# Patient Record
Sex: MALE | Race: BLACK OR AFRICAN AMERICAN | NOT HISPANIC OR LATINO | Employment: OTHER | URBAN - METROPOLITAN AREA
[De-identification: names, ages, dates, MRNs, and addresses within clinical notes are randomized per-mention and may not be internally consistent; named-entity substitution may affect disease eponyms.]

---

## 2018-03-26 ENCOUNTER — CLINICAL SUPPORT (OUTPATIENT)
Dept: FAMILY MEDICINE | Facility: CLINIC | Age: 53
End: 2018-03-26
Payer: MEDICARE

## 2018-03-26 DIAGNOSIS — Z00.00 ROUTINE GENERAL MEDICAL EXAMINATION AT A HEALTH CARE FACILITY: Primary | ICD-10-CM

## 2018-03-26 LAB
BILIRUB SERPL-MCNC: NORMAL MG/DL
BLOOD URINE, POC: NORMAL
COLOR, POC UA: NORMAL
GLUCOSE UR QL STRIP: NORMAL
KETONES UR QL STRIP: NORMAL
LEUKOCYTE ESTERASE URINE, POC: NORMAL
NITRITE, POC UA: NORMAL
PH, POC UA: NORMAL
PROTEIN, POC: NORMAL
SPECIFIC GRAVITY, POC UA: NORMAL
UROBILINOGEN, POC UA: NORMAL

## 2018-03-26 PROCEDURE — 99201 PR OFFICE/OUTPT VISIT,NEW,LEVL I: CPT | Mod: S$GLB,,, | Performed by: FAMILY MEDICINE

## 2018-03-26 PROCEDURE — 81002 URINALYSIS NONAUTO W/O SCOPE: CPT | Mod: S$GLB,,, | Performed by: FAMILY MEDICINE

## 2018-05-07 ENCOUNTER — HOSPITAL ENCOUNTER (INPATIENT)
Facility: HOSPITAL | Age: 53
LOS: 3 days | Discharge: HOME | DRG: 189 | End: 2018-05-11
Attending: EMERGENCY MEDICINE | Admitting: INTERNAL MEDICINE
Payer: MEDICARE

## 2018-05-07 DIAGNOSIS — E11.65 TYPE 2 DIABETES MELLITUS WITH HYPERGLYCEMIA, WITH LONG-TERM CURRENT USE OF INSULIN: ICD-10-CM

## 2018-05-07 DIAGNOSIS — R73.9 HYPERGLYCEMIA: ICD-10-CM

## 2018-05-07 DIAGNOSIS — J44.1 COPD WITH ACUTE EXACERBATION: ICD-10-CM

## 2018-05-07 DIAGNOSIS — E78.5 HYPERLIPIDEMIA, UNSPECIFIED HYPERLIPIDEMIA TYPE: ICD-10-CM

## 2018-05-07 DIAGNOSIS — J96.01 ACUTE HYPOXEMIC RESPIRATORY FAILURE: ICD-10-CM

## 2018-05-07 DIAGNOSIS — N17.9 AKI (ACUTE KIDNEY INJURY): ICD-10-CM

## 2018-05-07 DIAGNOSIS — R05.9 COUGH: ICD-10-CM

## 2018-05-07 DIAGNOSIS — R06.00 DYSPNEA, UNSPECIFIED TYPE: Primary | ICD-10-CM

## 2018-05-07 DIAGNOSIS — J44.1 COPD EXACERBATION: ICD-10-CM

## 2018-05-07 DIAGNOSIS — R06.02 SHORTNESS OF BREATH: ICD-10-CM

## 2018-05-07 DIAGNOSIS — I10 ESSENTIAL HYPERTENSION: ICD-10-CM

## 2018-05-07 DIAGNOSIS — Z79.4 TYPE 2 DIABETES MELLITUS WITH HYPERGLYCEMIA, WITH LONG-TERM CURRENT USE OF INSULIN: ICD-10-CM

## 2018-05-07 LAB
ALBUMIN SERPL BCP-MCNC: 3.2 G/DL
ALLENS TEST: ABNORMAL
ALP SERPL-CCNC: 154 U/L
ALT SERPL W/O P-5'-P-CCNC: 55 U/L
ANION GAP SERPL CALC-SCNC: 14 MMOL/L
AST SERPL-CCNC: 36 U/L
B-OH-BUTYR BLD STRIP-SCNC: 0.1 MMOL/L
BASOPHILS # BLD AUTO: 0.01 K/UL
BASOPHILS NFR BLD: 0.1 %
BILIRUB SERPL-MCNC: 0.5 MG/DL
BNP SERPL-MCNC: 39 PG/ML
BUN SERPL-MCNC: 19 MG/DL
CALCIUM SERPL-MCNC: 8.9 MG/DL
CHLORIDE SERPL-SCNC: 98 MMOL/L
CO2 SERPL-SCNC: 21 MMOL/L
CREAT SERPL-MCNC: 1.5 MG/DL
DELSYS: ABNORMAL
DIFFERENTIAL METHOD: ABNORMAL
EOSINOPHIL # BLD AUTO: 0.2 K/UL
EOSINOPHIL NFR BLD: 2 %
ERYTHROCYTE [DISTWIDTH] IN BLOOD BY AUTOMATED COUNT: 14.9 %
EST. GFR  (AFRICAN AMERICAN): >60 ML/MIN/1.73 M^2
EST. GFR  (NON AFRICAN AMERICAN): 54 ML/MIN/1.73 M^2
GLUCOSE SERPL-MCNC: 456 MG/DL (ref 70–110)
GLUCOSE SERPL-MCNC: 456 MG/DL (ref 70–110)
GLUCOSE SERPL-MCNC: 494 MG/DL
HCO3 UR-SCNC: 24.5 MMOL/L (ref 24–28)
HCT VFR BLD AUTO: 34.2 %
HGB BLD-MCNC: 11.4 G/DL
LACTATE SERPL-SCNC: 2.1 MMOL/L
LYMPHOCYTES # BLD AUTO: 1.9 K/UL
LYMPHOCYTES NFR BLD: 16.1 %
MCH RBC QN AUTO: 26.8 PG
MCHC RBC AUTO-ENTMCNC: 33.3 G/DL
MCV RBC AUTO: 80 FL
MONOCYTES # BLD AUTO: 0.9 K/UL
MONOCYTES NFR BLD: 7.8 %
NEUTROPHILS # BLD AUTO: 8.5 K/UL
NEUTROPHILS NFR BLD: 74 %
PCO2 BLDA: 40.6 MMHG (ref 35–45)
PH SMN: 7.39 [PH] (ref 7.35–7.45)
PLATELET # BLD AUTO: 267 K/UL
PMV BLD AUTO: 10.7 FL
PO2 BLDA: 56 MMHG (ref 40–60)
POC BE: 0 MMOL/L
POC SATURATED O2: 88 % (ref 95–100)
POC TCO2: 26 MMOL/L (ref 24–29)
POTASSIUM SERPL-SCNC: 4 MMOL/L
PROT SERPL-MCNC: 6.7 G/DL
RBC # BLD AUTO: 4.26 M/UL
SAMPLE: ABNORMAL
SODIUM SERPL-SCNC: 133 MMOL/L
TROPONIN I SERPL DL<=0.01 NG/ML-MCNC: 0.01 NG/ML
WBC # BLD AUTO: 11.51 K/UL

## 2018-05-07 PROCEDURE — 80053 COMPREHEN METABOLIC PANEL: CPT

## 2018-05-07 PROCEDURE — 87040 BLOOD CULTURE FOR BACTERIA: CPT

## 2018-05-07 PROCEDURE — 82962 GLUCOSE BLOOD TEST: CPT

## 2018-05-07 PROCEDURE — 82803 BLOOD GASES ANY COMBINATION: CPT

## 2018-05-07 PROCEDURE — 83880 ASSAY OF NATRIURETIC PEPTIDE: CPT

## 2018-05-07 PROCEDURE — 96374 THER/PROPH/DIAG INJ IV PUSH: CPT

## 2018-05-07 PROCEDURE — 83605 ASSAY OF LACTIC ACID: CPT

## 2018-05-07 PROCEDURE — 93010 ELECTROCARDIOGRAM REPORT: CPT | Mod: ,,, | Performed by: INTERNAL MEDICINE

## 2018-05-07 PROCEDURE — 93005 ELECTROCARDIOGRAM TRACING: CPT

## 2018-05-07 PROCEDURE — 63600175 PHARM REV CODE 636 W HCPCS: Performed by: EMERGENCY MEDICINE

## 2018-05-07 PROCEDURE — 99900035 HC TECH TIME PER 15 MIN (STAT)

## 2018-05-07 PROCEDURE — 96375 TX/PRO/DX INJ NEW DRUG ADDON: CPT

## 2018-05-07 PROCEDURE — 94644 CONT INHLJ TX 1ST HOUR: CPT

## 2018-05-07 PROCEDURE — 96372 THER/PROPH/DIAG INJ SC/IM: CPT

## 2018-05-07 PROCEDURE — 82010 KETONE BODYS QUAN: CPT

## 2018-05-07 PROCEDURE — 81000 URINALYSIS NONAUTO W/SCOPE: CPT

## 2018-05-07 PROCEDURE — 84484 ASSAY OF TROPONIN QUANT: CPT

## 2018-05-07 PROCEDURE — 25000242 PHARM REV CODE 250 ALT 637 W/ HCPCS: Performed by: EMERGENCY MEDICINE

## 2018-05-07 PROCEDURE — 85025 COMPLETE CBC W/AUTO DIFF WBC: CPT

## 2018-05-07 PROCEDURE — 99285 EMERGENCY DEPT VISIT HI MDM: CPT | Mod: 25

## 2018-05-07 RX ORDER — INSULIN ASPART 100 [IU]/ML
1-10 INJECTION, SOLUTION INTRAVENOUS; SUBCUTANEOUS
Status: DISCONTINUED | OUTPATIENT
Start: 2018-05-07 | End: 2018-05-08

## 2018-05-07 RX ORDER — IPRATROPIUM BROMIDE 0.5 MG/2.5ML
1 SOLUTION RESPIRATORY (INHALATION)
Status: COMPLETED | OUTPATIENT
Start: 2018-05-07 | End: 2018-05-07

## 2018-05-07 RX ORDER — ALBUTEROL SULFATE 0.83 MG/ML
15 SOLUTION RESPIRATORY (INHALATION)
Status: COMPLETED | OUTPATIENT
Start: 2018-05-07 | End: 2018-05-07

## 2018-05-07 RX ORDER — IBUPROFEN 200 MG
16 TABLET ORAL
Status: DISCONTINUED | OUTPATIENT
Start: 2018-05-07 | End: 2018-05-11 | Stop reason: HOSPADM

## 2018-05-07 RX ORDER — IBUPROFEN 200 MG
24 TABLET ORAL
Status: DISCONTINUED | OUTPATIENT
Start: 2018-05-07 | End: 2018-05-11 | Stop reason: HOSPADM

## 2018-05-07 RX ORDER — GLUCAGON 1 MG
1 KIT INJECTION
Status: DISCONTINUED | OUTPATIENT
Start: 2018-05-07 | End: 2018-05-11 | Stop reason: HOSPADM

## 2018-05-07 RX ORDER — METHYLPREDNISOLONE SOD SUCC 125 MG
125 VIAL (EA) INJECTION
Status: COMPLETED | OUTPATIENT
Start: 2018-05-07 | End: 2018-05-07

## 2018-05-07 RX ADMIN — IPRATROPIUM BROMIDE 1 MG: 0.5 SOLUTION RESPIRATORY (INHALATION) at 09:05

## 2018-05-07 RX ADMIN — ALBUTEROL SULFATE 15 MG: 2.5 SOLUTION RESPIRATORY (INHALATION) at 09:05

## 2018-05-07 RX ADMIN — METHYLPREDNISOLONE SODIUM SUCCINATE 125 MG: 125 INJECTION, POWDER, FOR SOLUTION INTRAMUSCULAR; INTRAVENOUS at 10:05

## 2018-05-07 RX ADMIN — INSULIN HUMAN 10 UNITS: 100 INJECTION, SOLUTION PARENTERAL at 10:05

## 2018-05-07 NOTE — LETTER
May 8, 2018         [address]  [place] [address]  Phone: [phone]  Fax: [phone]       To Whom It May Concern:    Nyla Riley has been at [facility] visiting her  in the critical care department. 05/08/2018. Please excuse her from work today as her  has been ill and under out care.  If you have any questions or concerns, or if I can be of further assistance, please do not hesitate to contact me.    Sincerely,        Vern Francisco, NOAHN, RN, CCRN  [facility]

## 2018-05-08 PROBLEM — I10 ESSENTIAL HYPERTENSION: Status: ACTIVE | Noted: 2018-05-08

## 2018-05-08 PROBLEM — N17.9 AKI (ACUTE KIDNEY INJURY): Status: ACTIVE | Noted: 2018-05-08

## 2018-05-08 PROBLEM — D50.9 MICROCYTIC ANEMIA: Status: ACTIVE | Noted: 2018-05-08

## 2018-05-08 PROBLEM — E78.5 HYPERLIPIDEMIA: Status: ACTIVE | Noted: 2018-05-08

## 2018-05-08 PROBLEM — E11.9 TYPE 2 DIABETES MELLITUS, WITH LONG-TERM CURRENT USE OF INSULIN: Status: ACTIVE | Noted: 2018-05-08

## 2018-05-08 PROBLEM — R05.9 COUGH: Status: ACTIVE | Noted: 2018-05-08

## 2018-05-08 PROBLEM — Z79.4 TYPE 2 DIABETES MELLITUS, WITH LONG-TERM CURRENT USE OF INSULIN: Status: ACTIVE | Noted: 2018-05-08

## 2018-05-08 PROBLEM — J44.1 COPD WITH ACUTE EXACERBATION: Status: ACTIVE | Noted: 2018-05-08

## 2018-05-08 PROBLEM — J96.01 ACUTE HYPOXEMIC RESPIRATORY FAILURE: Status: ACTIVE | Noted: 2018-05-08

## 2018-05-08 LAB
A1AT SERPL-MCNC: 210 MG/DL
ALBUMIN SERPL BCP-MCNC: 3.2 G/DL
ALP SERPL-CCNC: 163 U/L
ALT SERPL W/O P-5'-P-CCNC: 55 U/L
ANION GAP SERPL CALC-SCNC: 13 MMOL/L
AST SERPL-CCNC: 26 U/L
BACTERIA #/AREA URNS HPF: NORMAL /HPF
BASOPHILS # BLD AUTO: 0.01 K/UL
BASOPHILS NFR BLD: 0.1 %
BILIRUB SERPL-MCNC: 0.4 MG/DL
BILIRUB UR QL STRIP: NEGATIVE
BUN SERPL-MCNC: 21 MG/DL
CALCIUM SERPL-MCNC: 9.6 MG/DL
CHLORIDE SERPL-SCNC: 97 MMOL/L
CLARITY UR: CLEAR
CO2 SERPL-SCNC: 24 MMOL/L
COLOR UR: YELLOW
CREAT SERPL-MCNC: 1.5 MG/DL
CREAT UR-MCNC: 70.3 MG/DL
DIFFERENTIAL METHOD: ABNORMAL
EOSINOPHIL # BLD AUTO: 0 K/UL
EOSINOPHIL NFR BLD: 0.2 %
ERYTHROCYTE [DISTWIDTH] IN BLOOD BY AUTOMATED COUNT: 14.4 %
EST. GFR  (AFRICAN AMERICAN): >60 ML/MIN/1.73 M^2
EST. GFR  (NON AFRICAN AMERICAN): 54 ML/MIN/1.73 M^2
ESTIMATED AVG GLUCOSE: 223 MG/DL
FERRITIN SERPL-MCNC: 1380 NG/ML
FOLATE SERPL-MCNC: 9.3 NG/ML
GLUCOSE SERPL-MCNC: 361 MG/DL (ref 70–110)
GLUCOSE SERPL-MCNC: 516 MG/DL
GLUCOSE UR QL STRIP: ABNORMAL
HBA1C MFR BLD HPLC: 9.4 %
HCT VFR BLD AUTO: 34.9 %
HGB BLD-MCNC: 12 G/DL
HGB UR QL STRIP: NEGATIVE
IRON SERPL-MCNC: 16 UG/DL
KETONES UR QL STRIP: NEGATIVE
LEUKOCYTE ESTERASE UR QL STRIP: NEGATIVE
LYMPHOCYTES # BLD AUTO: 0.8 K/UL
LYMPHOCYTES NFR BLD: 6.5 %
MCH RBC QN AUTO: 27.3 PG
MCHC RBC AUTO-ENTMCNC: 34.4 G/DL
MCV RBC AUTO: 79 FL
MICROSCOPIC COMMENT: NORMAL
MONOCYTES # BLD AUTO: 0.2 K/UL
MONOCYTES NFR BLD: 1.3 %
NEUTROPHILS # BLD AUTO: 11.6 K/UL
NEUTROPHILS NFR BLD: 91.3 %
NITRITE UR QL STRIP: NEGATIVE
PH UR STRIP: 5 [PH] (ref 5–8)
PLATELET # BLD AUTO: 275 K/UL
PMV BLD AUTO: 10.1 FL
POCT GLUCOSE: 361 MG/DL (ref 70–110)
POCT GLUCOSE: 397 MG/DL (ref 70–110)
POCT GLUCOSE: 405 MG/DL (ref 70–110)
POCT GLUCOSE: 418 MG/DL (ref 70–110)
POCT GLUCOSE: 427 MG/DL (ref 70–110)
POCT GLUCOSE: 456 MG/DL (ref 70–110)
POCT GLUCOSE: 468 MG/DL (ref 70–110)
POTASSIUM SERPL-SCNC: 4.9 MMOL/L
PROT SERPL-MCNC: 7.8 G/DL
PROT UR QL STRIP: NEGATIVE
RBC # BLD AUTO: 4.4 M/UL
RBC #/AREA URNS HPF: 2 /HPF (ref 0–4)
RETICS/RBC NFR AUTO: 1 %
SATURATED IRON: 6 %
SODIUM SERPL-SCNC: 134 MMOL/L
SODIUM UR-SCNC: 31 MMOL/L
SP GR UR STRIP: 1.01 (ref 1–1.03)
SQUAMOUS #/AREA URNS HPF: 2 /HPF
TOTAL IRON BINDING CAPACITY: 260 UG/DL
TRANSFERRIN SERPL-MCNC: 176 MG/DL
TSH SERPL DL<=0.005 MIU/L-ACNC: 0.56 UIU/ML
URN SPEC COLLECT METH UR: ABNORMAL
UROBILINOGEN UR STRIP-ACNC: NEGATIVE EU/DL
UUN UR-MCNC: 341 MG/DL
VIT B12 SERPL-MCNC: 510 PG/ML
WBC # BLD AUTO: 12.72 K/UL
WBC #/AREA URNS HPF: 0 /HPF (ref 0–5)
YEAST URNS QL MICRO: NORMAL

## 2018-05-08 PROCEDURE — 36415 COLL VENOUS BLD VENIPUNCTURE: CPT

## 2018-05-08 PROCEDURE — 27000221 HC OXYGEN, UP TO 24 HOURS

## 2018-05-08 PROCEDURE — 20000000 HC ICU ROOM

## 2018-05-08 PROCEDURE — 94640 AIRWAY INHALATION TREATMENT: CPT

## 2018-05-08 PROCEDURE — 82728 ASSAY OF FERRITIN: CPT

## 2018-05-08 PROCEDURE — 82570 ASSAY OF URINE CREATININE: CPT

## 2018-05-08 PROCEDURE — 82103 ALPHA-1-ANTITRYPSIN TOTAL: CPT

## 2018-05-08 PROCEDURE — 25000003 PHARM REV CODE 250: Performed by: INTERNAL MEDICINE

## 2018-05-08 PROCEDURE — 84300 ASSAY OF URINE SODIUM: CPT

## 2018-05-08 PROCEDURE — 84443 ASSAY THYROID STIM HORMONE: CPT

## 2018-05-08 PROCEDURE — 85025 COMPLETE CBC W/AUTO DIFF WBC: CPT

## 2018-05-08 PROCEDURE — 82607 VITAMIN B-12: CPT

## 2018-05-08 PROCEDURE — 25000242 PHARM REV CODE 250 ALT 637 W/ HCPCS: Performed by: INTERNAL MEDICINE

## 2018-05-08 PROCEDURE — 63600175 PHARM REV CODE 636 W HCPCS: Performed by: INTERNAL MEDICINE

## 2018-05-08 PROCEDURE — 82746 ASSAY OF FOLIC ACID SERUM: CPT

## 2018-05-08 PROCEDURE — 83540 ASSAY OF IRON: CPT

## 2018-05-08 PROCEDURE — 27000190 HC CPAP FULL FACE MASK W/VALVE

## 2018-05-08 PROCEDURE — 83036 HEMOGLOBIN GLYCOSYLATED A1C: CPT

## 2018-05-08 PROCEDURE — 63600175 PHARM REV CODE 636 W HCPCS: Performed by: EMERGENCY MEDICINE

## 2018-05-08 PROCEDURE — 84540 ASSAY OF URINE/UREA-N: CPT

## 2018-05-08 PROCEDURE — 85045 AUTOMATED RETICULOCYTE COUNT: CPT

## 2018-05-08 PROCEDURE — 99900035 HC TECH TIME PER 15 MIN (STAT)

## 2018-05-08 PROCEDURE — 25500020 PHARM REV CODE 255: Performed by: EMERGENCY MEDICINE

## 2018-05-08 PROCEDURE — 80053 COMPREHEN METABOLIC PANEL: CPT

## 2018-05-08 PROCEDURE — 94660 CPAP INITIATION&MGMT: CPT

## 2018-05-08 PROCEDURE — 94761 N-INVAS EAR/PLS OXIMETRY MLT: CPT

## 2018-05-08 PROCEDURE — 25000003 PHARM REV CODE 250: Performed by: EMERGENCY MEDICINE

## 2018-05-08 RX ORDER — INSULIN ASPART 100 [IU]/ML
1-10 INJECTION, SOLUTION INTRAVENOUS; SUBCUTANEOUS EVERY 4 HOURS
Status: DISCONTINUED | OUTPATIENT
Start: 2018-05-08 | End: 2018-05-11 | Stop reason: HOSPADM

## 2018-05-08 RX ORDER — MOXIFLOXACIN HYDROCHLORIDE 400 MG/1
400 TABLET ORAL
Status: COMPLETED | OUTPATIENT
Start: 2018-05-08 | End: 2018-05-08

## 2018-05-08 RX ORDER — HEPARIN SODIUM 5000 [USP'U]/ML
5000 INJECTION, SOLUTION INTRAVENOUS; SUBCUTANEOUS EVERY 12 HOURS
Status: DISCONTINUED | OUTPATIENT
Start: 2018-05-08 | End: 2018-05-11 | Stop reason: HOSPADM

## 2018-05-08 RX ORDER — INSULIN ASPART 100 [IU]/ML
50 INJECTION, SUSPENSION SUBCUTANEOUS
Status: DISCONTINUED | OUTPATIENT
Start: 2018-05-08 | End: 2018-05-09

## 2018-05-08 RX ORDER — PREDNISONE 20 MG/1
40 TABLET ORAL DAILY
Status: DISCONTINUED | OUTPATIENT
Start: 2018-05-08 | End: 2018-05-11 | Stop reason: HOSPADM

## 2018-05-08 RX ORDER — AMLODIPINE BESYLATE 5 MG/1
10 TABLET ORAL DAILY
Status: DISCONTINUED | OUTPATIENT
Start: 2018-05-08 | End: 2018-05-11 | Stop reason: HOSPADM

## 2018-05-08 RX ORDER — MOXIFLOXACIN HYDROCHLORIDE 400 MG/1
400 TABLET ORAL
Status: DISCONTINUED | OUTPATIENT
Start: 2018-05-08 | End: 2018-05-11 | Stop reason: HOSPADM

## 2018-05-08 RX ORDER — FLUTICASONE FUROATE AND VILANTEROL 100; 25 UG/1; UG/1
1 POWDER RESPIRATORY (INHALATION) DAILY
Status: DISCONTINUED | OUTPATIENT
Start: 2018-05-08 | End: 2018-05-11 | Stop reason: HOSPADM

## 2018-05-08 RX ORDER — PRAVASTATIN SODIUM 20 MG/1
40 TABLET ORAL DAILY
Status: DISCONTINUED | OUTPATIENT
Start: 2018-05-08 | End: 2018-05-11 | Stop reason: HOSPADM

## 2018-05-08 RX ORDER — MAGNESIUM SULFATE HEPTAHYDRATE 40 MG/ML
2 INJECTION, SOLUTION INTRAVENOUS
Status: COMPLETED | OUTPATIENT
Start: 2018-05-08 | End: 2018-05-08

## 2018-05-08 RX ORDER — IPRATROPIUM BROMIDE AND ALBUTEROL SULFATE 2.5; .5 MG/3ML; MG/3ML
3 SOLUTION RESPIRATORY (INHALATION) EVERY 4 HOURS
Status: DISCONTINUED | OUTPATIENT
Start: 2018-05-08 | End: 2018-05-11 | Stop reason: HOSPADM

## 2018-05-08 RX ADMIN — IPRATROPIUM BROMIDE AND ALBUTEROL SULFATE 3 ML: .5; 3 SOLUTION RESPIRATORY (INHALATION) at 05:05

## 2018-05-08 RX ADMIN — INSULIN ASPART 10 UNITS: 100 INJECTION, SOLUTION INTRAVENOUS; SUBCUTANEOUS at 03:05

## 2018-05-08 RX ADMIN — PRAVASTATIN SODIUM 40 MG: 40 TABLET ORAL at 09:05

## 2018-05-08 RX ADMIN — MOXIFLOXACIN HYDROCHLORIDE 400 MG: 400 TABLET, FILM COATED ORAL at 02:05

## 2018-05-08 RX ADMIN — IPRATROPIUM BROMIDE AND ALBUTEROL SULFATE 3 ML: .5; 3 SOLUTION RESPIRATORY (INHALATION) at 08:05

## 2018-05-08 RX ADMIN — PREDNISONE 40 MG: 20 TABLET ORAL at 09:05

## 2018-05-08 RX ADMIN — IOHEXOL 100 ML: 350 INJECTION, SOLUTION INTRAVENOUS at 12:05

## 2018-05-08 RX ADMIN — INSULIN ASPART 50 UNITS: 100 INJECTION, SUSPENSION SUBCUTANEOUS at 06:05

## 2018-05-08 RX ADMIN — INSULIN ASPART 10 UNITS: 100 INJECTION, SOLUTION INTRAVENOUS; SUBCUTANEOUS at 06:05

## 2018-05-08 RX ADMIN — MAGNESIUM SULFATE HEPTAHYDRATE 2 G: 40 INJECTION, SOLUTION INTRAVENOUS at 02:05

## 2018-05-08 RX ADMIN — IPRATROPIUM BROMIDE AND ALBUTEROL SULFATE 3 ML: .5; 3 SOLUTION RESPIRATORY (INHALATION) at 12:05

## 2018-05-08 RX ADMIN — IPRATROPIUM BROMIDE AND ALBUTEROL SULFATE 3 ML: .5; 3 SOLUTION RESPIRATORY (INHALATION) at 09:05

## 2018-05-08 RX ADMIN — IPRATROPIUM BROMIDE AND ALBUTEROL SULFATE 3 ML: .5; 3 SOLUTION RESPIRATORY (INHALATION) at 03:05

## 2018-05-08 RX ADMIN — MOXIFLOXACIN HYDROCHLORIDE 400 MG: 400 TABLET, FILM COATED ORAL at 10:05

## 2018-05-08 RX ADMIN — INSULIN ASPART 10 UNITS: 100 INJECTION, SOLUTION INTRAVENOUS; SUBCUTANEOUS at 10:05

## 2018-05-08 RX ADMIN — INSULIN ASPART 5 UNITS: 100 INJECTION, SOLUTION INTRAVENOUS; SUBCUTANEOUS at 10:05

## 2018-05-08 RX ADMIN — FLUTICASONE FUROATE AND VILANTEROL TRIFENATATE 1 PUFF: 100; 25 POWDER RESPIRATORY (INHALATION) at 09:05

## 2018-05-08 RX ADMIN — INSULIN ASPART 50 UNITS: 100 INJECTION, SUSPENSION SUBCUTANEOUS at 07:05

## 2018-05-08 RX ADMIN — AMLODIPINE BESYLATE 10 MG: 5 TABLET ORAL at 09:05

## 2018-05-08 RX ADMIN — HEPARIN SODIUM 5000 UNITS: 5000 INJECTION, SOLUTION INTRAVENOUS; SUBCUTANEOUS at 09:05

## 2018-05-08 RX ADMIN — HEPARIN SODIUM 5000 UNITS: 5000 INJECTION, SOLUTION INTRAVENOUS; SUBCUTANEOUS at 10:05

## 2018-05-08 NOTE — ED NOTES
Report received, pt care assumed. Pt sitting up on stretcher, NAD, VSS. All lines and tubes patent and secured. Continuous inhalation treatment in progress. Pt reports slight improvement in SOB. Breath sounds diminished, coarse throughout, +cough, mild tachypnea noted. Pt and family updated on plan of care and result wait times. Will continue to monitor.
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
## 2018-05-08 NOTE — PROGRESS NOTES
Patient's bedside POCT 397, Dr Austin notified and MD fofana to give 10 units SQ Novolog per ordered scale. A little while later, critical POCT 516 received from Lab on am CMP. Dr Fox notified, MD to place orders.     Patient maintained on BiPAP, VSSS. Wife at bedside.

## 2018-05-08 NOTE — PLAN OF CARE
TN went to meet with patient, no family at bedside.  Patient is independent and lives at home with his spouse. He does not have home health or medical equipment at home, only a glucometer. Patient still drives. He requests to be screened for Medicaid while in the hospital. TN informed Anastacia (medicaid screener). TN will continue to follow and assess discharge needs.     05/08/18 1226   Discharge Assessment   Assessment Type Discharge Planning Assessment   Confirmed/corrected address and phone number on facesheet? Yes   Assessment information obtained from? Patient   Prior to hospitilization cognitive status: Alert/Oriented   Prior to hospitalization functional status: Independent   Current cognitive status: Alert/Oriented   Current Functional Status: Independent   Facility Arrived From: Home   Lives With spouse   Able to Return to Prior Arrangements yes   Is patient able to care for self after discharge? Yes   Who are your caregiver(s) and their phone number(s)? OsvaldoNyla Spouse [phone]    Patient's perception of discharge disposition home or selfcare   Readmission Within The Last 30 Days no previous admission in last 30 days   Equipment Currently Used at Home glucometer   Do you have any problems affording any of your prescribed medications? TBD   Does the patient have transportation home? Yes   Transportation Available family or friend will provide   Dialysis Name and Scheduled days N/A   Does the patient receive services at the Coumadin Clinic? No   Discharge Plan A Home with family   Discharge Plan B Home with family;Home Health   Patient/Family In Agreement With Plan yes     Elizabeth Telles RN  Transition Navigator  [phone]

## 2018-05-08 NOTE — ED PROVIDER NOTES
"Encounter Date: 5/7/2018       History     Chief Complaint   Patient presents with    Shortness of Breath     accompanied by abdominal pain, chest congestion and cold like symptoms; also has a CBG of 482     CHIEF COMPLAINT: Patient presents with: shortness of breath, cough,     HISTORY OF PRESENT ILLNESS: Tadeo Riley who is a 52 y.o. presents to the emergency department today with complaint of cough, shortness of breath. Pt has had cough for the past 3 days or so. He has had sputum production. He started with increased SOB today. Denies current h/o asthma or COPD. Had asthma as a child. Additionally he is diabetic and his glucose levels are high today. Yesterday running "ok 150s-190s." He has no abdominal pain. He has chest pain when he coughs. Pt was satting 96% on RA when EMS arrived. He had very diminished breath sounds. They gave him solumedrol and a combivent, still diminished so then gave albuterol.  Starting feeling better en route.     REVIEW OF SYSTEMS:  Constitutional: No fever, no chills.  Eyes: No discharge. No pain.  HENT: No nasal drainage. No ear ache. No sore throat.   Cardiovascular: No chest pain, no palpitations.  Respiratory: See above.  Gastrointestinal: No abdominal pain, no vomiting. No diarrhea  Genitourinary: No hematuria, dysuria, urgency.  Musculoskeletal: No back pain.  Skin: No rashes, no lesions.  Neurological: No headache, no focal weakness.    Otherwise remaining ROS negative     ALLERGIES REVIEWED  MEDICATIONS REVIEWED  PMH/PSH/SOC/FH REVIEWED     The history is provided by the patient.    Nursing/Ancillary staff note reviewed.                  Review of patient's allergies indicates:   Allergen Reactions    Metformin     Benadryl [diphenhydramine hcl] Rash     Past Medical History:   Diagnosis Date    Diabetes mellitus     Hypertension      History reviewed. No pertinent surgical history.  History reviewed. No pertinent family history.  Social History   Substance Use Topics "    Smoking status: Never Smoker    Smokeless tobacco: Not on file    Alcohol use No     Review of Systems   All other systems reviewed and are negative.      Physical Exam     Initial Vitals [05/07/18 2028]   BP Pulse Resp Temp SpO2   (!) 141/74 (!) 123 (!) 24 99.9 °F (37.7 °C) 99 %      MAP       96.33         Physical Exam    Nursing note and vitals reviewed.  Constitutional: He appears well-developed and well-nourished. He is not diaphoretic. No distress.   Speaking in short sentences but reports feeling improved from previous.     HENT:   Head: Normocephalic and atraumatic.   Right Ear: External ear normal.   Left Ear: External ear normal.   Nose: Nose normal.   Mouth/Throat: Oropharynx is clear and moist.   Eyes: Conjunctivae and EOM are normal. Pupils are equal, round, and reactive to light. No scleral icterus.   Neck: Normal range of motion. Neck supple. No JVD present.   Cardiovascular: Regular rhythm and normal heart sounds. Exam reveals no gallop and no friction rub.    No murmur heard.  Tachycardic   Pulmonary/Chest: No stridor. He is in respiratory distress. He has wheezes. He exhibits no tenderness.   Wheezing throughout all lung zones. No crackles.    Abdominal: Soft. Bowel sounds are normal. He exhibits no distension and no mass. There is no tenderness. There is no rebound and no guarding.   Musculoskeletal: Normal range of motion. He exhibits no edema or tenderness.        Cervical back: Normal.        Thoracic back: Normal.        Lumbar back: Normal.   Lymphadenopathy:     He has no cervical adenopathy.   Neurological: He is alert and oriented to person, place, and time. He has normal strength. No cranial nerve deficit.   Skin: Skin is warm and dry. No rash noted. No pallor.   Psychiatric: He has a normal mood and affect. Thought content normal.         ED Course   Procedures  Labs Reviewed - No data to display  EKG Readings: (Independently Interpreted)   Initial Reading: No STEMI.   116 bpm,  sinus tachycardia, no ST elevations, T-wave inversion in lead 3       X-Rays: Other Radiology Reports: Reviewed by myself, read by radiology.        Imaging Results          X-Ray Chest PA And Lateral (Final result)  Result time 05/07/18 21:18:16    Final result by Zack Toledo MD (05/07/18 21:18:16)                 Impression:      No acute process.      Electronically signed by: Zack Toledo MD  Date:    05/07/2018  Time:    21:18             Narrative:    EXAMINATION:  XR CHEST PA AND LATERAL    CLINICAL HISTORY:  Cough    TECHNIQUE:  PA and lateral views of the chest were performed.  Please note that the lateral projection is limited due to beam attenuation by extensive soft tissue.    COMPARISON:  05/05/2018.    FINDINGS:  The trachea is unremarkable.  The cardiomediastinal silhouette is within normal limits.  The hemidiaphragms are unremarkable.  There is no evidence of free air beneath the hemidiaphragms.  There are no pleural effusions.  There is no evidence of a pneumothorax.  There is no evidence of pneumomediastinum.  No airspace opacities are present.  The osseous structures are unremarkable.                                    Medical Decision Making:   Differential Diagnosis:   Pulmonary infectious process, COPD, asthma, pulmonary embolus and congestive heart failure.    Independently Interpreted Test(s):   I have ordered and independently interpreted EKG Reading(s) - see prior notes  Clinical Tests:   Lab Tests: Ordered  Radiological Study: Ordered and Reviewed  Medical Tests: Ordered and Reviewed  ED Management:  2145 Pts wheezing has returned. Will give a breathing treatment.      2215 Pt care turned over to Dr Marr, awaiting CTA, he will make the final disposition.                         Clinical Impression:   The primary encounter diagnosis was Dyspnea, unspecified type. Diagnoses of Hyperglycemia, Cough, COPD exacerbation, COPD with acute exacerbation, Acute hypoxemic respiratory failure, MARTHA  (acute kidney injury), Essential hypertension, Hyperlipidemia, unspecified hyperlipidemia type, Type 2 diabetes mellitus with hyperglycemia, with long-term current use of insulin, and Shortness of breath were also pertinent to this visit.                           Linda Kidd MD  06/02/18 0760

## 2018-05-08 NOTE — NURSING TRANSFER
Nursing Transfer Note      5/8/2018     Transfer To:  FROM ED     Transfer via stretcher    Transfer with NC to O2 4L, cardiac monitoring    Transported by CAROL Belle     Medicines sent: Yes-Insulin pen     Chart send with patient: Yes    Notified: spouse    Patient reassessed at: 05/08/2018, 03:35     Upon arrival to floor: cardiac monitor applied, patient oriented to room, call bell in reach and bed in lowest position. Patient ambulated to bed from stretcher independently. BiPAP placed on as ordered. POC reviewed with patient and spouse at the bedside.

## 2018-05-08 NOTE — PROVIDER PROGRESS NOTES - EMERGENCY DEPT.
Encounter Date: 5/7/2018    ED Physician Progress Notes           **This is an assumption of care note**    Case accepted from Dr. Kidd at 10pm, pending CTA PE study.  I agree with previous physician's history/physical and overall assessment.   Patient presents with cough, shortness of breath, wheezing ×3 days.  Labs reviewed which showed no significant abnormalities.  X-ray reviewed shows no evidence of pneumonia or other acute pulmonary disease.      PHYSICAL EXAMINATION:  GENERAL:   Alert and oriented x3, no acute distress  VITAL SIGNS:  Per nurse's note, reviewed by me .  SKIN: +Diaphoretic; warm; no cyanosis; no rash, no pallor  HEAD:  Atraumatic, normocephalic  EYES:  no conjunctival pallor, no scleral icterus, EOMI.  ENMT:  Pharynx:  no erythema; airway patent: no stridor; mucous membranes moist  NECK:  no tenderness, normal ROM, no lymphadenopathy.  CHEST AND RESPIRATORY:  Mild to moderate respiratory distress, diffuse expiratory wheezing bilaterally, no rales, no rhonchi  HEART AND CARDIOVASCULAR:  Tachycardic, no irregularity; no murmur,   ABDOMEN AND GI:  Obese  EXTREMITIES:  no deformity, no edema, no tenderness.  NEURO AND PSYCH:  Mental status as above.  Moving all extremities.    LABS:  Labs Reviewed   CBC W/ AUTO DIFFERENTIAL - Abnormal; Notable for the following:        Result Value    RBC 4.26 (*)     Hemoglobin 11.4 (*)     Hematocrit 34.2 (*)     MCV 80 (*)     MCH 26.8 (*)     RDW 14.9 (*)     Gran # (ANC) 8.5 (*)     Gran% 74.0 (*)     Lymph% 16.1 (*)     All other components within normal limits   COMPREHENSIVE METABOLIC PANEL - Abnormal; Notable for the following:     Sodium 133 (*)     CO2 21 (*)     Glucose 494 (*)     Creatinine 1.5 (*)     Albumin 3.2 (*)     Alkaline Phosphatase 154 (*)     ALT 55 (*)     eGFR if non  54 (*)     All other components within normal limits    Narrative:     GLU  critical result(s) called and verbal readback obtained from Anna Nickerson RN,  05/07/2018 22:29   URINALYSIS - Abnormal; Notable for the following:     Glucose, UA 3+ (*)     All other components within normal limits   POCT GLUCOSE MONITORING CONTINUOUS - Abnormal; Notable for the following:     POC Glucose 456 (*)     All other components within normal limits   POCT GLUCOSE MONITORING CONTINUOUS - Abnormal; Notable for the following:     POC Glucose 456 (*)     All other components within normal limits   ISTAT PROCEDURE - Abnormal; Notable for the following:     POC SATURATED O2 88 (*)     All other components within normal limits   POCT GLUCOSE MONITORING CONTINUOUS - Abnormal; Notable for the following:     POC Glucose 361 (*)     All other components within normal limits   CULTURE, BLOOD   CULTURE, BLOOD   BETA - HYDROXYBUTYRATE, SERUM   B-TYPE NATRIURETIC PEPTIDE   PROCALCITONIN   LACTIC ACID, PLASMA   TROPONIN I   TROPONIN I   URINALYSIS MICROSCOPIC         IMAGING:  Imaging Results          CTA Chest Non-Coronary - PE Study (Final result)  Result time 05/08/18 00:55:49    Final result by Zack Toledo MD (05/08/18 00:55:49)                 Impression:      No evidence of central pulmonary embolism.  Segmental and subsegmental pulmonary tree are poorly visualized secondary to extensive motion.  Suggest correlation with additional clinical parameters and possible DVT study.    No acute intrathoracic process, allowing for motion limitations..    Hepatic steatosis.      Electronically signed by: Zack Toledo MD  Date:    05/08/2018  Time:    00:55             Narrative:    EXAMINATION:  CTA CHEST NON CORONARY    CLINICAL HISTORY:  shortness of breath;    TECHNIQUE:  Low dose axial images, sagittal and coronal reformations were obtained from the thoracic inlet to the lung bases following the IV administration of 100 mL of Omnipaque 350.  Contrast timing was optimized to evaluate the pulmonary arteries.  MIP images were performed.    COMPARISON:  Chest x-ray dated 05/07/2018    FINDINGS:  CT  pulmonary embolism examination:    No central pulmonary embolism is present.  The segmental and subsegmental pulmonary tree are poorly opacified for the evaluation of pulmonary embolism.  The pulmonary trunk is nondilated.    CT chest examination:    The thyroid gland is unremarkable.  The base of the neck is within normal limits.  The supraclavicular regions are unremarkable.    The trachea is unremarkable.  The central airways are poorly visualized secondary to extensive motion.  No definitive bronchiectasis.    The heart is unremarkable.  There is no evidence of intracardiac thrombus.  No pericardial effusions are present.  No significant coronary artery calcifications are identified.    The thoracic aorta is normal in caliber.  There is no intimal flap to suggest dissection.  The great vessels arising from the aortic arch are within normal limits.    There is no evidence of lymphadenopathy in the chest.  The axillary regions are within normal limits.    There are no pleural effusions.  There is no evidence of a pneumothorax.  There is no evidence of pneumomediastinum.  No airspace opacity is present.  Evaluation for pulmonary nodules is difficult given extensive motion.    The esophagus is unremarkable.  There is hepatic steatosis.  The remaining visualized upper abdominal structures appear unremarkable.    The chest wall is unremarkable.  The osseous structures are unremarkable.                               X-Ray Chest PA And Lateral (Final result)  Result time 05/07/18 21:18:16    Final result by Zack Toledo MD (05/07/18 21:18:16)                 Impression:      No acute process.      Electronically signed by: Zack Toledo MD  Date:    05/07/2018  Time:    21:18             Narrative:    EXAMINATION:  XR CHEST PA AND LATERAL    CLINICAL HISTORY:  Cough    TECHNIQUE:  PA and lateral views of the chest were performed.  Please note that the lateral projection is limited due to beam attenuation by extensive  soft tissue.    COMPARISON:  05/05/2018.    FINDINGS:  The trachea is unremarkable.  The cardiomediastinal silhouette is within normal limits.  The hemidiaphragms are unremarkable.  There is no evidence of free air beneath the hemidiaphragms.  There are no pleural effusions.  There is no evidence of a pneumothorax.  There is no evidence of pneumomediastinum.  No airspace opacities are present.  The osseous structures are unremarkable.                                  ED COURSE:      CTA was a suboptimal study, however no central PE.  Will order a DVT study.  Patient received continuous neb, and was evaluated afterwards.  Patient noted to have significant wheezing, work of breathing.  We'll place patient on BiPAP, mag drip.      1:16 AM -case discussed with [facility] medicine resident, Dr. Fox, who accepted the patient and will evaluate in the ED.    Critical Care  Date/Time: 5/8/2018 1:20 AM  Performed by: REYES MILLER  Authorized by: REYES MILLER   Total critical care time (exclusive of procedural time) : 30 minutes          DIAGNOSIS:  1. Dyspnea, unspecified type    2. Hyperglycemia    3. Cough    4. COPD exacerbation           DISPOSITION:     Patient admitted to the ICU in fair to serious condition.

## 2018-05-09 LAB
ALBUMIN SERPL BCP-MCNC: 3.1 G/DL
ALP SERPL-CCNC: 144 U/L
ALT SERPL W/O P-5'-P-CCNC: 42 U/L
ANION GAP SERPL CALC-SCNC: 10 MMOL/L
AST SERPL-CCNC: 14 U/L
BASOPHILS # BLD AUTO: 0.03 K/UL
BASOPHILS NFR BLD: 0.2 %
BILIRUB SERPL-MCNC: 0.3 MG/DL
BUN SERPL-MCNC: 33 MG/DL
CALCIUM SERPL-MCNC: 9.6 MG/DL
CHLORIDE SERPL-SCNC: 100 MMOL/L
CO2 SERPL-SCNC: 27 MMOL/L
CREAT SERPL-MCNC: 1.4 MG/DL
DIASTOLIC DYSFUNCTION: NO
DIFFERENTIAL METHOD: ABNORMAL
EOSINOPHIL # BLD AUTO: 0 K/UL
EOSINOPHIL NFR BLD: 0.1 %
ERYTHROCYTE [DISTWIDTH] IN BLOOD BY AUTOMATED COUNT: 14.3 %
EST. GFR  (AFRICAN AMERICAN): >60 ML/MIN/1.73 M^2
EST. GFR  (NON AFRICAN AMERICAN): 58 ML/MIN/1.73 M^2
GLUCOSE SERPL-MCNC: 344 MG/DL
HCT VFR BLD AUTO: 33.4 %
HGB BLD-MCNC: 11.7 G/DL
LYMPHOCYTES # BLD AUTO: 1.6 K/UL
LYMPHOCYTES NFR BLD: 10.2 %
MCH RBC QN AUTO: 27.8 PG
MCHC RBC AUTO-ENTMCNC: 35 G/DL
MCV RBC AUTO: 79 FL
MONOCYTES # BLD AUTO: 1.2 K/UL
MONOCYTES NFR BLD: 7.2 %
NEUTROPHILS # BLD AUTO: 13.1 K/UL
NEUTROPHILS NFR BLD: 81.4 %
PLATELET # BLD AUTO: 293 K/UL
PMV BLD AUTO: 10.1 FL
POCT GLUCOSE: 270 MG/DL (ref 70–110)
POCT GLUCOSE: 294 MG/DL (ref 70–110)
POCT GLUCOSE: 301 MG/DL (ref 70–110)
POCT GLUCOSE: 345 MG/DL (ref 70–110)
POCT GLUCOSE: 355 MG/DL (ref 70–110)
POCT GLUCOSE: 367 MG/DL (ref 70–110)
POTASSIUM SERPL-SCNC: 4.2 MMOL/L
PROCALCITONIN SERPL IA-MCNC: 0.14 NG/ML
PROT SERPL-MCNC: 7.7 G/DL
RBC # BLD AUTO: 4.21 M/UL
RETIRED EF AND QEF - SEE NOTES: 65 (ref 55–65)
SODIUM SERPL-SCNC: 137 MMOL/L
WBC # BLD AUTO: 16.1 K/UL

## 2018-05-09 PROCEDURE — 63600175 PHARM REV CODE 636 W HCPCS: Performed by: INTERNAL MEDICINE

## 2018-05-09 PROCEDURE — 25000003 PHARM REV CODE 250: Performed by: INTERNAL MEDICINE

## 2018-05-09 PROCEDURE — 27000221 HC OXYGEN, UP TO 24 HOURS

## 2018-05-09 PROCEDURE — 20000000 HC ICU ROOM

## 2018-05-09 PROCEDURE — 25000242 PHARM REV CODE 250 ALT 637 W/ HCPCS: Performed by: INTERNAL MEDICINE

## 2018-05-09 PROCEDURE — 93306 TTE W/DOPPLER COMPLETE: CPT

## 2018-05-09 PROCEDURE — 99900035 HC TECH TIME PER 15 MIN (STAT)

## 2018-05-09 PROCEDURE — 36415 COLL VENOUS BLD VENIPUNCTURE: CPT

## 2018-05-09 PROCEDURE — 94660 CPAP INITIATION&MGMT: CPT

## 2018-05-09 PROCEDURE — 94640 AIRWAY INHALATION TREATMENT: CPT

## 2018-05-09 PROCEDURE — 84145 PROCALCITONIN (PCT): CPT

## 2018-05-09 PROCEDURE — 80053 COMPREHEN METABOLIC PANEL: CPT

## 2018-05-09 PROCEDURE — 85025 COMPLETE CBC W/AUTO DIFF WBC: CPT

## 2018-05-09 PROCEDURE — 94761 N-INVAS EAR/PLS OXIMETRY MLT: CPT

## 2018-05-09 RX ORDER — INSULIN ASPART 100 [IU]/ML
55 INJECTION, SUSPENSION SUBCUTANEOUS
Status: DISCONTINUED | OUTPATIENT
Start: 2018-05-09 | End: 2018-05-11 | Stop reason: HOSPADM

## 2018-05-09 RX ADMIN — PREDNISONE 40 MG: 20 TABLET ORAL at 08:05

## 2018-05-09 RX ADMIN — INSULIN ASPART 6 UNITS: 100 INJECTION, SOLUTION INTRAVENOUS; SUBCUTANEOUS at 12:05

## 2018-05-09 RX ADMIN — HEPARIN SODIUM 5000 UNITS: 5000 INJECTION, SOLUTION INTRAVENOUS; SUBCUTANEOUS at 08:05

## 2018-05-09 RX ADMIN — IPRATROPIUM BROMIDE AND ALBUTEROL SULFATE 3 ML: .5; 3 SOLUTION RESPIRATORY (INHALATION) at 03:05

## 2018-05-09 RX ADMIN — INSULIN ASPART 50 UNITS: 100 INJECTION, SUSPENSION SUBCUTANEOUS at 08:05

## 2018-05-09 RX ADMIN — PRAVASTATIN SODIUM 40 MG: 40 TABLET ORAL at 08:05

## 2018-05-09 RX ADMIN — INSULIN ASPART 55 UNITS: 100 INJECTION, SUSPENSION SUBCUTANEOUS at 04:05

## 2018-05-09 RX ADMIN — INSULIN ASPART 8 UNITS: 100 INJECTION, SOLUTION INTRAVENOUS; SUBCUTANEOUS at 04:05

## 2018-05-09 RX ADMIN — FLUTICASONE FUROATE AND VILANTEROL TRIFENATATE 1 PUFF: 100; 25 POWDER RESPIRATORY (INHALATION) at 08:05

## 2018-05-09 RX ADMIN — IPRATROPIUM BROMIDE AND ALBUTEROL SULFATE 3 ML: .5; 3 SOLUTION RESPIRATORY (INHALATION) at 11:05

## 2018-05-09 RX ADMIN — MOXIFLOXACIN HYDROCHLORIDE 400 MG: 400 TABLET, FILM COATED ORAL at 08:05

## 2018-05-09 RX ADMIN — IPRATROPIUM BROMIDE AND ALBUTEROL SULFATE 3 ML: .5; 3 SOLUTION RESPIRATORY (INHALATION) at 07:05

## 2018-05-09 RX ADMIN — IPRATROPIUM BROMIDE AND ALBUTEROL SULFATE 3 ML: .5; 3 SOLUTION RESPIRATORY (INHALATION) at 12:05

## 2018-05-09 RX ADMIN — INSULIN ASPART 8 UNITS: 100 INJECTION, SOLUTION INTRAVENOUS; SUBCUTANEOUS at 06:05

## 2018-05-09 RX ADMIN — INSULIN ASPART 3 UNITS: 100 INJECTION, SOLUTION INTRAVENOUS; SUBCUTANEOUS at 09:05

## 2018-05-09 RX ADMIN — AMLODIPINE BESYLATE 10 MG: 5 TABLET ORAL at 08:05

## 2018-05-09 RX ADMIN — IPRATROPIUM BROMIDE AND ALBUTEROL SULFATE 3 ML: .5; 3 SOLUTION RESPIRATORY (INHALATION) at 08:05

## 2018-05-09 RX ADMIN — INSULIN ASPART 5 UNITS: 100 INJECTION, SOLUTION INTRAVENOUS; SUBCUTANEOUS at 02:05

## 2018-05-09 NOTE — PLAN OF CARE
Problem: Patient Care Overview  Goal: Plan of Care Review  Outcome: Ongoing (interventions implemented as appropriate)  Pt on BiPAP with documented settings, no respiratory distress noted. Alarms are set and functioning with adequate volumes. Will continue to monitor.

## 2018-05-09 NOTE — PLAN OF CARE
Problem: Patient Care Overview  Goal: Plan of Care Review  Outcome: Ongoing (interventions implemented as appropriate)  Pt continues to use Bipap on for 3 hrs off for 3 hours. Rest w/o complaint. Resp tx done per R. T q 4 hrs. NPC and exp wheeze noted. Glucose check Q4 hrs.

## 2018-05-09 NOTE — PROGRESS NOTES
[facility] Medicine Resident HO-II Progress Note    Subjective:      Tolerated bipap yesterday and overnight. Was 3 hours on and 3 hours off yesterday; states that did well for 2 hours after being taken off but would then feel short of breath and need to be placed back on. This AM was seen at 0830 and had been off since 4. Did well but after eating breakfast, felt short of breath and asked to be placed back on. Endorses wheezing and SOB, BERUMEN, productive cough. Denies cp, abd pain, n/v, fever/chills/sweats.     Objective:   Last 24 Hour Vital Signs:  BP  Min: 127/79  Max: 171/79  Temp  Av.1 °F (36.7 °C)  Min: 97.4 °F (36.3 °C)  Max: 98.9 °F (37.2 °C)  Pulse  Av.3  Min: 90  Max: 109  Resp  Av.6  Min: 15  Max: 49  SpO2  Av.8 %  Min: 92 %  Max: 100 %  Weight  Av.1 kg (335 lb 5.1 oz)  Min: 152.1 kg (335 lb 5.1 oz)  Max: 152.1 kg (335 lb 5.1 oz)  I/O last 3 completed shifts:  In: 500 [P.O.:450; I.V.:50]  Out: 3075 [Urine:3075]    Physical Examination:  General Appearance:    sitting in bed with NC in place  no acute distress   Head:    Normocephalic, without obvious abnormality, atraumatic   Eyes:    PERRL, conjunctiva/corneas clear, EOM's intact   Nose:   Nares normal, septum midline, mucosa normal, no drainage    or sinus tenderness   Throat:   Lips, mucosa, and tongue normal; teeth and gums normal   Neck:   Supple, symmetrical, trachea midline, no adenopathy;        thyroid:  No enlargement/tenderness/nodules; no carotid    Bruit. JVP unable to be assessed 2/2 body habitus.    Back:     Symmetric, no curvature, ROM normal, no CVA tenderness   Lungs:     Tachypnea, 2 L NC, insp/exp wheezing, bibasilar rhales, no rhonci.    Chest wall:    No tenderness or deformity   Heart:    Tachycardia, Regular rhythm, nml S1 and prominent S2 normal, + DOMENICO   Abdomen:     Soft, non-tender, bowel sounds active all four quadrants,     no masses, no organomegaly   Extremities:   Extremities normal, atraumatic, no  cyanosis. Pretibial 2+pitting edema    Pulses:   2+ and symmetric all extremities   Skin:   Skin color, texture, turgor normal, no rashes or lesions   Lymph nodes:   Cervical, supraclavicular, and axillary nodes normal         Laboratory:  Laboratory Data Reviewed: yes  Pertinent Findings:    Recent Labs  Lab 05/07/18  2130 05/08/18  0615 05/09/18  0349   WBC 11.51 12.72* 16.10*   HGB 11.4* 12.0* 11.7*   HCT 34.2* 34.9* 33.4*    275 293   MCV 80* 79* 79*   RDW 14.9* 14.4 14.3   * 134* 137   K 4.0 4.9 4.2   CL 98 97 100   CO2 21* 24 27   BUN 19 21* 33*   CREATININE 1.5* 1.5* 1.4   * 516* 344*   PROT 6.7 7.8 7.7   ALBUMIN 3.2* 3.2* 3.1*   BILITOT 0.5 0.4 0.3   AST 36 26 14   ALKPHOS 154* 163* 144*   ALT 55* 55* 42       Microbiology Data Reviewed: yes  Pertinent Findings:  BCx (5/7): ngtd    Radiology Data Reviewed: yes  Pertinent Findings:  LE US (5/8): no DVT  CTA (5/8): no PE  CXR (5/7): no acute process    Current Medications:     Infusions:       Scheduled:   albuterol-ipratropium 2.5mg-0.5mg/3mL  3 mL Nebulization Q4H    amLODIPine  10 mg Oral Daily    fluticasone-vilanterol  1 puff Inhalation Daily    heparin (porcine)  5,000 Units Subcutaneous Q12H    insulin aspart protamine-insulin aspart  50 Units Subcutaneous BID AC    insulin aspart U-100  1-10 Units Subcutaneous Q4H    moxifloxacin  400 mg Oral Q24H    pravastatin  40 mg Oral Daily    predniSONE  40 mg Oral Daily        PRN:  dextrose 50%, dextrose 50%, glucagon (human recombinant), glucose, glucose, pneumoc 13-aniceto conj-dip cr(PF)    Antibiotics and Day Number of Therapy:  Moxifloxacin (5/7-present)    Assessment:     Tadeo Riley is a 52 y.o.male with  Patient Active Problem List    Diagnosis Date Noted    COPD with acute exacerbation 05/08/2018    Acute hypoxemic respiratory failure 05/08/2018    Essential hypertension 05/08/2018    Type 2 diabetes mellitus, with long-term current use of insulin 05/08/2018    MARTHA  (acute kidney injury) 05/08/2018    Hyperlipidemia 05/08/2018    Microcytic anemia 05/08/2018    Cough 05/08/2018        Plan:     Acute Hypoxic Respiratory Distress 2/2 COPD Exacerbation  -SOB, wheezing and cough for 3 days   -BNP 39, trop negative, EKG without acute ischemia  -CXR pa/lat negative  -CT a chest: No evidence of central pulmonary embolism. LE US neg for DVT  -VBG WNL  -initially required bipap 2/2 sob and hypoxia  not tolerating prolonged holidays off bipap  -cont prednisone 40mg daily, q4h duonebs, began breo, Moxi 400mg q24 po  -procal pending   -Solumedrol 125mg IV in ED  -alpha 1 antitrypsin elevated     Elevated Cr, stable  -gfr >60, Cr 1.5  continues   -No baseline labs  -fe urea 34.8%  -UA without protein  -likely chronic 2/2 uncontrolled DM     HTN, Controlled   -BP stable on admit   -On home HCTZ-lisinopril 12.5-20mg  -Will give amlodipine 10mg,      Uncontrolled DMII with Hyperglycemia and renal manifestations  -Patients taking 50 units BID of 70/30 Novolin   -glucose 494  -10 units insulin in ED and will give 70/30 BID in house with SSI  -a1c 9.4  -Patient on steroids  titrate insulin as needed      HLD  -Cont home pravastatin 40mg     Anemia of Chronic Disease  -Hgb/Hct 11.4/34.2, MCV 80  -tibc 260 and elevated ferritin  -hgb stable without signs of bleeding     Mild Transaminits, stable   -AST/ALT 36/55  -monitor    Ruben Mariscal  [facility] Medicine HO-II  [facility] Hospitalist Medicine Service Team A    [facility] Medicine Hospitalist Pager numbers:   [facility] Hospitalist Medicine Team A (Alea/Alejandro): [phone]  [facility] Hospitalist Medicine Team B (Yamilka/Roseline):  [phone]

## 2018-05-09 NOTE — MEDICAL/APP STUDENT
"U IM  Medical Student - Team A Progress Note    Subjective:     Tadeo Riley is a 52 y.o. male who is being followed by the [facility] IM service at [facility] for Respiratory distress likely 2/2 COPD exacerbation. Overnight he did well but it still having difficulty breathing this morning. He is not in distress (Sat 92-96% RA), but states his breathing in uncomfortable. He requested to be put back on BiPAP.      Objective:  Last 24 Hour Vital Signs:  BP  Min: 127/79  Max: 183/98  Temp  Av.1 °F (36.7 °C)  Min: 97.7 °F (36.5 °C)  Max: 98.9 °F (37.2 °C)  Pulse  Av.5  Min: 90  Max: 109  Resp  Av.7  Min: 15  Max: 49  SpO2  Av.7 %  Min: 92 %  Max: 100 %  Weight  Av.1 kg (335 lb 5.1 oz)  Min: 152.1 kg (335 lb 5.1 oz)  Max: 152.1 kg (335 lb 5.1 oz)  I/O last 3 completed shifts:  In: 860 [P.O.:810; I.V.:50]  Out: 3725 [Urine:3725]    Physical Examination:  BP (!) 183/98   Pulse 103   Temp 98.1 °F (36.7 °C) (Oral)   Resp 20   Ht 5' 7" (1.702 m)   Wt (!) 152.1 kg (335 lb 5.1 oz)   SpO2 (!) 94%   BMI 52.52 kg/m²     General Appearance:    Alert, cooperative, no distress, appears stated age   Head:    Normocephalic, without obvious abnormality, atraumatic   Eyes:   Conjunctiva/corneas clear, EOM's intact      Throat:   Lips, mucosa, and tongue normal; teeth and gums normal   Neck:   Supple, symmetrical, trachea midline, no adenopathy;        thyroid:  No enlargement/tenderness/nodules   Back:     Symmetric, no curvature, ROM normal, no CVA tenderness   Lungs:     Coarse breath sounds bilaterally with prolonges expiratory phase and end expiratory wheeze.   Chest wall:    No tenderness or deformity   Heart:    Regular rate and rhythm, S1 and S2 normal, no murmur, rub   or gallop   Abdomen:     Soft, non-tender, bowel sounds active all four quadrants,     no masses, no organomegaly   Extremities:   Extremities normal, atraumatic, no cyanosis or edema   Pulses:   2+ and symmetric " all extremities   Skin:   Skin color, texture, turgor normal, no rashes or lesions   Lymph nodes:   Cervical, supraclavicular, and axillary nodes normal   Neurologic:   Normal strength and sensation         Laboratory:  Laboratory Data Reviewed: yes  Pertinent Findings:  Lab Results   Component Value Date    WBC 16.10 (H) 05/09/2018    WBC 12.72 (H) 05/08/2018    WBC 11.51 05/07/2018          BUN 33 (H) 05/09/2018    BUN 21 (H) 05/08/2018    BUN 19 05/07/2018           (H) 05/09/2018     (HH) 05/08/2018     (HH) 05/07/2018     Microbiology Data Reviewed: yes    Radiology Data Reviewed: yes  Pertinent Findings:  LE US: No DVT  CTA Chest: No PE    Current Medications:     Infusions:       Scheduled:   albuterol-ipratropium 2.5mg-0.5mg/3mL  3 mL Nebulization Q4H    amLODIPine  10 mg Oral Daily    fluticasone-vilanterol  1 puff Inhalation Daily    heparin (porcine)  5,000 Units Subcutaneous Q12H    insulin aspart protamine-insulin aspart  55 Units Subcutaneous BID AC    insulin aspart U-100  1-10 Units Subcutaneous Q4H    moxifloxacin  400 mg Oral Q24H    pravastatin  40 mg Oral Daily    predniSONE  40 mg Oral Daily        PRN:  dextrose 50%, dextrose 50%, glucagon (human recombinant), glucose, glucose, pneumoc 13-aniceto conj-dip cr(PF)    Antibiotics and Day Number of Therapy:  Moxifloxacin Day 2     Assessment:    Tadeo Riley is a 52 y.o.male with  Patient Active Problem List    Diagnosis Date Noted    COPD with acute exacerbation 05/08/2018    Acute hypoxemic respiratory failure 05/08/2018    Essential hypertension 05/08/2018    Type 2 diabetes mellitus, with long-term current use of insulin 05/08/2018    MARTHA (acute kidney injury) 05/08/2018    Hyperlipidemia 05/08/2018    Microcytic anemia 05/08/2018    Cough 05/08/2018        Plan:    Acute Hypoxic Resp Failure  - Underlying formally undiagnosed COPD likely the main issue  - Alpha-1 antitrypsyn normal, no evidence of PE  -  Continue to wean patient BiPAP use    HTN  - Home medication management   - Continue to monitor    DMII  - Titrate insulin regimen  - Continue SSI    Anemia  - Labs reflect chronic disease etiology most likely  - Continue to monitor for active bleeding     Dispo: Remain in ICU and monitor for improvement    Reinaldo Donald  Medical Student  U Internal Medicine Service Team A

## 2018-05-09 NOTE — PLAN OF CARE
Problem: Patient Care Overview  Goal: Plan of Care Review  Outcome: Ongoing (interventions implemented as appropriate)  Pt requested to be place on bipap. No respiratory distress noted. Will continue to monitor SpO2.

## 2018-05-09 NOTE — PLAN OF CARE
Patient remains in ICU. TN reviewed patient's clinicals.   Medicaid screener did make room visit, per patient request. He does not qualify for medicaid as a secondary insurance. TN will continue to follow and assess discharge needs.     05/09/18 1149   Discharge Reassessment   Assessment Type Discharge Planning Reassessment   Provided patient/caregiver education on the expected discharge date and the discharge plan No   Discharge Plan A Home with family   Discharge Plan B Home with family;Home Health   Patient choice form signed by patient/caregiver N/A   Can the patient answer the patient profile reliably? Yes, cognitively intact     Elizabteh Telles RN  Transition Navigator  [phone]
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
## 2018-05-10 PROBLEM — R06.00 DYSPNEA: Status: ACTIVE | Noted: 2018-05-10

## 2018-05-10 LAB
ALBUMIN SERPL BCP-MCNC: 3 G/DL
ALP SERPL-CCNC: 125 U/L
ALT SERPL W/O P-5'-P-CCNC: 32 U/L
ANION GAP SERPL CALC-SCNC: 8 MMOL/L
AST SERPL-CCNC: 13 U/L
BASOPHILS # BLD AUTO: 0.04 K/UL
BASOPHILS NFR BLD: 0.3 %
BILIRUB SERPL-MCNC: 0.3 MG/DL
BUN SERPL-MCNC: 24 MG/DL
CALCIUM SERPL-MCNC: 9.4 MG/DL
CHLORIDE SERPL-SCNC: 103 MMOL/L
CO2 SERPL-SCNC: 30 MMOL/L
CREAT SERPL-MCNC: 1.1 MG/DL
DIFFERENTIAL METHOD: ABNORMAL
EOSINOPHIL # BLD AUTO: 0.1 K/UL
EOSINOPHIL NFR BLD: 0.6 %
ERYTHROCYTE [DISTWIDTH] IN BLOOD BY AUTOMATED COUNT: 14.2 %
EST. GFR  (AFRICAN AMERICAN): >60 ML/MIN/1.73 M^2
EST. GFR  (NON AFRICAN AMERICAN): >60 ML/MIN/1.73 M^2
GLUCOSE SERPL-MCNC: 155 MG/DL
HCT VFR BLD AUTO: 32.7 %
HGB BLD-MCNC: 11.1 G/DL
LYMPHOCYTES # BLD AUTO: 2.6 K/UL
LYMPHOCYTES NFR BLD: 19.3 %
MCH RBC QN AUTO: 27.1 PG
MCHC RBC AUTO-ENTMCNC: 33.9 G/DL
MCV RBC AUTO: 80 FL
MONOCYTES # BLD AUTO: 1 K/UL
MONOCYTES NFR BLD: 7.6 %
NEUTROPHILS # BLD AUTO: 9.6 K/UL
NEUTROPHILS NFR BLD: 70.8 %
PLATELET # BLD AUTO: 292 K/UL
PMV BLD AUTO: 10.2 FL
POCT GLUCOSE: 154 MG/DL (ref 70–110)
POCT GLUCOSE: 159 MG/DL (ref 70–110)
POCT GLUCOSE: 174 MG/DL (ref 70–110)
POCT GLUCOSE: 191 MG/DL (ref 70–110)
POCT GLUCOSE: 230 MG/DL (ref 70–110)
POCT GLUCOSE: 236 MG/DL (ref 70–110)
POCT GLUCOSE: 285 MG/DL (ref 70–110)
POTASSIUM SERPL-SCNC: 4 MMOL/L
PROT SERPL-MCNC: 7 G/DL
RBC # BLD AUTO: 4.09 M/UL
SODIUM SERPL-SCNC: 141 MMOL/L
WBC # BLD AUTO: 13.6 K/UL

## 2018-05-10 PROCEDURE — 25000242 PHARM REV CODE 250 ALT 637 W/ HCPCS: Performed by: INTERNAL MEDICINE

## 2018-05-10 PROCEDURE — 63600175 PHARM REV CODE 636 W HCPCS: Performed by: INTERNAL MEDICINE

## 2018-05-10 PROCEDURE — 94761 N-INVAS EAR/PLS OXIMETRY MLT: CPT

## 2018-05-10 PROCEDURE — 27000221 HC OXYGEN, UP TO 24 HOURS

## 2018-05-10 PROCEDURE — 85025 COMPLETE CBC W/AUTO DIFF WBC: CPT

## 2018-05-10 PROCEDURE — 94640 AIRWAY INHALATION TREATMENT: CPT

## 2018-05-10 PROCEDURE — 80053 COMPREHEN METABOLIC PANEL: CPT

## 2018-05-10 PROCEDURE — 11000001 HC ACUTE MED/SURG PRIVATE ROOM

## 2018-05-10 PROCEDURE — 99900035 HC TECH TIME PER 15 MIN (STAT)

## 2018-05-10 PROCEDURE — 94660 CPAP INITIATION&MGMT: CPT

## 2018-05-10 PROCEDURE — 25000003 PHARM REV CODE 250: Performed by: INTERNAL MEDICINE

## 2018-05-10 PROCEDURE — 36415 COLL VENOUS BLD VENIPUNCTURE: CPT

## 2018-05-10 RX ORDER — LISINOPRIL 10 MG/1
10 TABLET ORAL DAILY
Status: DISCONTINUED | OUTPATIENT
Start: 2018-05-10 | End: 2018-05-11 | Stop reason: HOSPADM

## 2018-05-10 RX ADMIN — HEPARIN SODIUM 5000 UNITS: 5000 INJECTION, SOLUTION INTRAVENOUS; SUBCUTANEOUS at 09:05

## 2018-05-10 RX ADMIN — INSULIN ASPART 55 UNITS: 100 INJECTION, SUSPENSION SUBCUTANEOUS at 05:05

## 2018-05-10 RX ADMIN — PREDNISONE 40 MG: 20 TABLET ORAL at 09:05

## 2018-05-10 RX ADMIN — INSULIN ASPART 55 UNITS: 100 INJECTION, SUSPENSION SUBCUTANEOUS at 09:05

## 2018-05-10 RX ADMIN — IPRATROPIUM BROMIDE AND ALBUTEROL SULFATE 3 ML: .5; 3 SOLUTION RESPIRATORY (INHALATION) at 12:05

## 2018-05-10 RX ADMIN — PRAVASTATIN SODIUM 40 MG: 40 TABLET ORAL at 09:05

## 2018-05-10 RX ADMIN — LISINOPRIL 10 MG: 10 TABLET ORAL at 09:05

## 2018-05-10 RX ADMIN — INSULIN ASPART 2 UNITS: 100 INJECTION, SOLUTION INTRAVENOUS; SUBCUTANEOUS at 06:05

## 2018-05-10 RX ADMIN — MOXIFLOXACIN HYDROCHLORIDE 400 MG: 400 TABLET, FILM COATED ORAL at 09:05

## 2018-05-10 RX ADMIN — INSULIN ASPART 4 UNITS: 100 INJECTION, SOLUTION INTRAVENOUS; SUBCUTANEOUS at 07:05

## 2018-05-10 RX ADMIN — INSULIN ASPART 4 UNITS: 100 INJECTION, SOLUTION INTRAVENOUS; SUBCUTANEOUS at 01:05

## 2018-05-10 RX ADMIN — IPRATROPIUM BROMIDE AND ALBUTEROL SULFATE 3 ML: .5; 3 SOLUTION RESPIRATORY (INHALATION) at 07:05

## 2018-05-10 RX ADMIN — IPRATROPIUM BROMIDE AND ALBUTEROL SULFATE 3 ML: .5; 3 SOLUTION RESPIRATORY (INHALATION) at 11:05

## 2018-05-10 RX ADMIN — IPRATROPIUM BROMIDE AND ALBUTEROL SULFATE 3 ML: .5; 3 SOLUTION RESPIRATORY (INHALATION) at 03:05

## 2018-05-10 RX ADMIN — AMLODIPINE BESYLATE 10 MG: 5 TABLET ORAL at 09:05

## 2018-05-10 RX ADMIN — FLUTICASONE FUROATE AND VILANTEROL TRIFENATATE 1 PUFF: 100; 25 POWDER RESPIRATORY (INHALATION) at 07:05

## 2018-05-10 RX ADMIN — INSULIN ASPART 1 UNITS: 100 INJECTION, SOLUTION INTRAVENOUS; SUBCUTANEOUS at 09:05

## 2018-05-10 RX ADMIN — INSULIN ASPART 1 UNITS: 100 INJECTION, SOLUTION INTRAVENOUS; SUBCUTANEOUS at 02:05

## 2018-05-10 NOTE — NURSING
Patient's blood sugar's throughout the night have been 270, 157, & 154. Patient was given aspart correction dose every 4 hours as ordered. Verified with Dr. Schulte if the 70/30 55 units novolog medication should be given this morning at 0645 due to patient's blood sugar's being in the 150's recently. Okay to hold the 70/30 novolog.

## 2018-05-10 NOTE — PLAN OF CARE
TN went to meet with patient, but he was on his cellphone. Requested TN to follow-up at a later time. TN reviewed updated clinicals. He is currently on room air. TN will continue to follow.     05/10/18 155   Discharge Reassessment   Assessment Type Discharge Planning Reassessment   Provided patient/caregiver education on the expected discharge date and the discharge plan No   Discharge Plan A Home with family   Discharge Plan B Home with family;Home Health     Elizabeth Telles RN  Transition Navigator  [phone]

## 2018-05-10 NOTE — PROGRESS NOTES
[facility] Medicine Resident HO-II Progress Note    Subjective:      Improved breathing yesterday; did not require bipap during day and only used at night. Pulm evaluated and noted that pt has history of childhood asthma (with last inhaler use as a child) and recent URI. Endorses continued wheezing, productive cough, improved sob and blank. Denies cp, abd pain, n/v, fever/chills/sweats.     Objective:   Last 24 Hour Vital Signs:  BP  Min: 140/85  Max: 191/93  Temp  Av.9 °F (36.6 °C)  Min: 97.7 °F (36.5 °C)  Max: 98.1 °F (36.7 °C)  Pulse  Av  Min: 80  Max: 106  Resp  Av.9  Min: 14  Max: 29  SpO2  Av.1 %  Min: 89 %  Max: 98 %  I/O last 3 completed shifts:  In: 1110 [P.O.:1110]  Out: 2525 [Urine:2525]    Physical Examination:  General Appearance:    sitting in bed with NC in place  no acute distress   Head:    Normocephalic, without obvious abnormality, atraumatic   Eyes:    PERRL, conjunctiva/corneas clear, EOM's intact   Nose:   Nares normal, septum midline, mucosa normal, no drainage    or sinus tenderness   Throat:   Lips, mucosa, and tongue normal; teeth and gums normal   Neck:   Supple, symmetrical, trachea midline, no adenopathy;        thyroid:  No enlargement/tenderness/nodules; no carotid    Bruit. JVP unable to be assessed 2/2 body habitus.    Back:     Symmetric, no curvature, ROM normal, no CVA tenderness   Lungs:    crackles and end-expiratory wheezing (improved from previous)  no increased wob on RA   Chest wall:    No tenderness or deformity   Heart:    Tachycardia, Regular rhythm, nml S1 and prominent S2 normal,   Abdomen:     Soft, non-tender, bowel sounds active all four quadrants,     no masses, no organomegaly   Extremities:   Extremities normal, atraumatic, no cyanosis. Pretibial 2+pitting edema    Pulses:   2+ and symmetric all extremities   Skin:   Skin color, texture, turgor normal, no rashes or lesions   Lymph nodes:   Cervical, supraclavicular, and axillary nodes normal          Laboratory:  Laboratory Data Reviewed: yes  Pertinent Findings:    Recent Labs  Lab 05/08/18  0615 05/09/18  0349 05/10/18  0330   WBC 12.72* 16.10* 13.60*   HGB 12.0* 11.7* 11.1*   HCT 34.9* 33.4* 32.7*    293 292   MCV 79* 79* 80*   RDW 14.4 14.3 14.2   * 137 141   K 4.9 4.2 4.0   CL 97 100 103   CO2 24 27 30*   BUN 21* 33* 24*   CREATININE 1.5* 1.4 1.1   * 344* 155*   PROT 7.8 7.7 7.0   ALBUMIN 3.2* 3.1* 3.0*   BILITOT 0.4 0.3 0.3   AST 26 14 13   ALKPHOS 163* 144* 125   ALT 55* 42 32       Microbiology Data Reviewed: yes  Pertinent Findings:  BCx (5/7): ngtd    Radiology Data Reviewed: yes  Pertinent Findings:  LE US (5/8): no DVT  CTA (5/8): no PE  CXR (5/7): no acute process    Current Medications:     Infusions:       Scheduled:   albuterol-ipratropium 2.5mg-0.5mg/3mL  3 mL Nebulization Q4H    amLODIPine  10 mg Oral Daily    fluticasone-vilanterol  1 puff Inhalation Daily    heparin (porcine)  5,000 Units Subcutaneous Q12H    insulin aspart protamine-insulin aspart  55 Units Subcutaneous BID AC    insulin aspart U-100  1-10 Units Subcutaneous Q4H    moxifloxacin  400 mg Oral Q24H    pravastatin  40 mg Oral Daily    predniSONE  40 mg Oral Daily        PRN:  dextrose 50%, dextrose 50%, glucagon (human recombinant), glucose, glucose, pneumoc 13-aniceto conj-dip cr(PF)    Antibiotics and Day Number of Therapy:  Moxifloxacin (5/7-present)    Assessment:     Tadeo Riley is a 52 y.o.male with  Patient Active Problem List    Diagnosis Date Noted    COPD with acute exacerbation 05/08/2018    Acute hypoxemic respiratory failure 05/08/2018    Essential hypertension 05/08/2018    Type 2 diabetes mellitus, with long-term current use of insulin 05/08/2018    MARTHA (acute kidney injury) 05/08/2018    Hyperlipidemia 05/08/2018    Microcytic anemia 05/08/2018    Cough 05/08/2018        Plan:     Acute Hypoxic Respiratory Distress 2/2 COPD Exacerbation  -SOB, wheezing and cough for 3  days   -BNP 39, trop negative, EKG without acute ischemia  -CXR pa/lat negative  -CT a chest: No evidence of central pulmonary embolism. LE US neg for DVT  -VBG WNL  -solumedrol 125mg IV in ED  -initially required bipap 2/2 sob and hypoxia  able to be weaned to NC with bipap at night  -pulm consulted, appreciate recs  -cont prednisone 40mg daily, q4h duonebs, began breo, Moxi 400mg q24 po  -procal wnl  -alpha 1 antitrypsin elevated     MARTHA, resolved  -gfr >60, Cr 1.5 on admit  no baseline labs  -fe urea 34.8%  -UA without protein  -likely chronic 2/2 uncontrolled DM  -improved     HTN, Controlled   -BP elevated   -On home HCTZ-lisinopril 12.5-20mg  held 2/2   -started on amlodipine 10  with resolution of MARTHA, will start back lisinopril at 10 and titrate up     Uncontrolled DMII with Hyperglycemia and renal manifestations  -Patients taking 50 units BID of 70/30 Novolin   -glucose 494  -10 units insulin in ED and will give 70/30 BID in house with SSI  -a1c 9.4  -Patient on steroids  titrate insulin as needed      HLD  -Cont home pravastatin 40mg     Anemia of Chronic Disease  -Hgb/Hct 11.4/34.2, MCV 80  -tibc 260 and elevated ferritin  -hgb stable without signs of bleeding     Mild Transaminits, stable   -AST/ALT 36/55  -monitor    Ruben Mariscal  [facility] Medicine HO-II  [facility] Hospitalist Medicine Service Team A    [facility] Medicine Hospitalist Pager numbers:   U Hospitalist Medicine Team A (Alea/Alejandro): [phone]  [facility] Hospitalist Medicine Team B (Yamilka/Roseline):  [phone]

## 2018-05-10 NOTE — MEDICAL/APP STUDENT
"U IM  Medical Student - Team A Progress Note     Subjective:     Tadeo Riley is a 52 y.o. male who is being followed by the [facility] IM service at [facility] for Respiratory distress likely 2/2 underling restrictive lung disease. Overnight he did well and is feeling better this morning. He is not in distress and breathing comfortably on RA. He denies CP, SOB, and N/V.      Objective:  Last 24 Hour Vital Signs:  BP  Min: 140/85  Max: 191/93  Temp  Av.9 °F (36.6 °C)  Min: 97.7 °F (36.5 °C)  Max: 98.1 °F (36.7 °C)  Pulse  Av  Min: 80  Max: 106  Resp  Av.9  Min: 14  Max: 29  SpO2  Av.1 %  Min: 89 %  Max: 98 %  I/O last 3 completed shifts:  In: 1110 [P.O.:1110]  Out: 2525 [Urine:2525]    Physical Examination:  BP (!) 140/85   Pulse 83   Temp 98.1 °F (36.7 °C) (Oral)   Resp 18   Ht 5' 7" (1.702 m)   Wt (!) 152.1 kg (335 lb 5.1 oz)   SpO2 97%   BMI 52.52 kg/m²         General Appearance:    Alert, cooperative, no distress, appears stated age   Head:    Normocephalic, without obvious abnormality, atraumatic   Eyes:   Conjunctiva/corneas clear, EOM's intact      Throat:   Lips, mucosa, and tongue normal; teeth and gums normal   Neck:   Supple, symmetrical, trachea midline, no adenopathy;        thyroid:  No enlargement/tenderness/nodules   Back:     Symmetric, no curvature, ROM normal, no CVA tenderness   Lungs:     Improving Coarse breath sounds bilaterally with decreased air movement and   end expiratory wheeze.   Chest wall:    No tenderness or deformity   Heart:    Regular rate and rhythm, S1 and S2 normal, no murmur, rub   or gallop   Abdomen:     Soft, non-tender, bowel sounds active all four quadrants,     no masses, no organomegaly   Extremities:   Extremities normal, atraumatic, no cyanosis or edema   Pulses:   2+ and symmetric all extremities   Skin:   Skin color, texture, turgor normal, no rashes or lesions   Lymph nodes:   Cervical, supraclavicular, and axillary nodes " normal   Neurologic:   Normal strength and sensation         Laboratory:  Laboratory Data Reviewed: yes  Microbiology Data Reviewed: yes  Radiology Data Reviewed: yes    Current Medications:     Infusions:       Scheduled:   albuterol-ipratropium 2.5mg-0.5mg/3mL  3 mL Nebulization Q4H    amLODIPine  10 mg Oral Daily    fluticasone-vilanterol  1 puff Inhalation Daily    heparin (porcine)  5,000 Units Subcutaneous Q12H    insulin aspart protamine-insulin aspart  55 Units Subcutaneous BID AC    insulin aspart U-100  1-10 Units Subcutaneous Q4H    lisinopril  10 mg Oral Daily    moxifloxacin  400 mg Oral Q24H    pravastatin  40 mg Oral Daily    predniSONE  40 mg Oral Daily        PRN:  dextrose 50%, dextrose 50%, glucagon (human recombinant), glucose, glucose, pneumoc 13-aniceto conj-dip cr(PF)    Antibiotics and Day Number of Therapy:  Moxifloxacin Day 3      Assessment:    Tadeo Riley is a 52 y.o.male with  Patient Active Problem List    Diagnosis Date Noted    COPD with acute exacerbation 05/08/2018    Acute hypoxemic respiratory failure 05/08/2018    Essential hypertension 05/08/2018    Type 2 diabetes mellitus, with long-term current use of insulin 05/08/2018    MARTHA (acute kidney injury) 05/08/2018    Hyperlipidemia 05/08/2018    Microcytic anemia 05/08/2018    Cough 05/08/2018        Plan:    Acute Hypoxic Resp Failure  - Underlying formally undiagnosed COPD likely the main issue  - Alpha-1 antitrypsyn normal, no evidence of PE  - On RA  - Outpatient PFT and sleep study     HTN  - Home medication management   - Continue to monitor     DMII  - Titrate insulin regimen  - Continue SSI     Anemia  - Labs reflect chronic disease etiology most likely  - Continue to monitor for active bleeding     Dispo: Stepdown to floor    Reinaldo Donald  Medical Student  U Internal Medicine Service Team A

## 2018-05-11 ENCOUNTER — TELEPHONE (OUTPATIENT)
Dept: PHARMACY | Facility: CLINIC | Age: 53
End: 2018-05-11

## 2018-05-11 VITALS
TEMPERATURE: 99 F | SYSTOLIC BLOOD PRESSURE: 132 MMHG | RESPIRATION RATE: 20 BRPM | BODY MASS INDEX: 49.44 KG/M2 | DIASTOLIC BLOOD PRESSURE: 82 MMHG | OXYGEN SATURATION: 100 % | HEART RATE: 97 BPM | WEIGHT: 315 LBS | HEIGHT: 67 IN

## 2018-05-11 LAB
ALBUMIN SERPL BCP-MCNC: 3 G/DL
ALP SERPL-CCNC: 114 U/L
ALT SERPL W/O P-5'-P-CCNC: 26 U/L
ANION GAP SERPL CALC-SCNC: 8 MMOL/L
ANISOCYTOSIS BLD QL SMEAR: SLIGHT
AST SERPL-CCNC: 13 U/L
BASOPHILS # BLD AUTO: ABNORMAL K/UL
BASOPHILS NFR BLD: 0 %
BILIRUB SERPL-MCNC: 0.3 MG/DL
BUN SERPL-MCNC: 17 MG/DL
CALCIUM SERPL-MCNC: 9.3 MG/DL
CHLORIDE SERPL-SCNC: 103 MMOL/L
CO2 SERPL-SCNC: 29 MMOL/L
CREAT SERPL-MCNC: 0.9 MG/DL
DIFFERENTIAL METHOD: ABNORMAL
EOSINOPHIL # BLD AUTO: ABNORMAL K/UL
EOSINOPHIL NFR BLD: 1 %
ERYTHROCYTE [DISTWIDTH] IN BLOOD BY AUTOMATED COUNT: 14.2 %
EST. GFR  (AFRICAN AMERICAN): >60 ML/MIN/1.73 M^2
EST. GFR  (NON AFRICAN AMERICAN): >60 ML/MIN/1.73 M^2
GLUCOSE SERPL-MCNC: 83 MG/DL
HCT VFR BLD AUTO: 33.4 %
HGB BLD-MCNC: 11.3 G/DL
LYMPHOCYTES # BLD AUTO: ABNORMAL K/UL
LYMPHOCYTES NFR BLD: 22 %
MCH RBC QN AUTO: 27 PG
MCHC RBC AUTO-ENTMCNC: 33.8 G/DL
MCV RBC AUTO: 80 FL
MONOCYTES # BLD AUTO: ABNORMAL K/UL
MONOCYTES NFR BLD: 8 %
NEUTROPHILS # BLD AUTO: ABNORMAL K/UL
NEUTROPHILS NFR BLD: 69 %
PLATELET # BLD AUTO: 282 K/UL
PLATELET BLD QL SMEAR: ABNORMAL
PMV BLD AUTO: 9.7 FL
POCT GLUCOSE: 111 MG/DL (ref 70–110)
POCT GLUCOSE: 240 MG/DL (ref 70–110)
POCT GLUCOSE: 90 MG/DL (ref 70–110)
POCT GLUCOSE: 99 MG/DL (ref 70–110)
POTASSIUM SERPL-SCNC: 3.5 MMOL/L
PROT SERPL-MCNC: 6.9 G/DL
RBC # BLD AUTO: 4.19 M/UL
SODIUM SERPL-SCNC: 140 MMOL/L
WBC # BLD AUTO: 14.25 K/UL

## 2018-05-11 PROCEDURE — 80053 COMPREHEN METABOLIC PANEL: CPT

## 2018-05-11 PROCEDURE — 25000003 PHARM REV CODE 250: Performed by: INTERNAL MEDICINE

## 2018-05-11 PROCEDURE — 25000242 PHARM REV CODE 250 ALT 637 W/ HCPCS: Performed by: INTERNAL MEDICINE

## 2018-05-11 PROCEDURE — 85007 BL SMEAR W/DIFF WBC COUNT: CPT

## 2018-05-11 PROCEDURE — 3E0234Z INTRODUCTION OF SERUM, TOXOID AND VACCINE INTO MUSCLE, PERCUTANEOUS APPROACH: ICD-10-PCS | Performed by: FAMILY MEDICINE

## 2018-05-11 PROCEDURE — 90471 IMMUNIZATION ADMIN: CPT | Performed by: INTERNAL MEDICINE

## 2018-05-11 PROCEDURE — 63600175 PHARM REV CODE 636 W HCPCS: Performed by: INTERNAL MEDICINE

## 2018-05-11 PROCEDURE — 90670 PCV13 VACCINE IM: CPT | Performed by: INTERNAL MEDICINE

## 2018-05-11 PROCEDURE — 94761 N-INVAS EAR/PLS OXIMETRY MLT: CPT

## 2018-05-11 PROCEDURE — 99900035 HC TECH TIME PER 15 MIN (STAT)

## 2018-05-11 PROCEDURE — 94640 AIRWAY INHALATION TREATMENT: CPT

## 2018-05-11 PROCEDURE — 36415 COLL VENOUS BLD VENIPUNCTURE: CPT

## 2018-05-11 PROCEDURE — G0009 ADMIN PNEUMOCOCCAL VACCINE: HCPCS | Performed by: INTERNAL MEDICINE

## 2018-05-11 PROCEDURE — 85027 COMPLETE CBC AUTOMATED: CPT

## 2018-05-11 RX ORDER — BENZONATATE 100 MG/1
100 CAPSULE ORAL 2 TIMES DAILY PRN
Qty: 10 CAPSULE | Refills: 5 | Status: SHIPPED | OUTPATIENT
Start: 2018-05-11 | End: 2018-05-11

## 2018-05-11 RX ORDER — MOXIFLOXACIN HYDROCHLORIDE 400 MG/1
400 TABLET ORAL DAILY
Qty: 1 TABLET | Refills: 0 | Status: SHIPPED | OUTPATIENT
Start: 2018-05-12 | End: 2018-05-13

## 2018-05-11 RX ORDER — ALBUTEROL SULFATE 90 UG/1
2 AEROSOL, METERED RESPIRATORY (INHALATION) EVERY 6 HOURS PRN
Qty: 18 G | Refills: 6 | Status: SHIPPED | OUTPATIENT
Start: 2018-05-11 | End: 2019-01-01 | Stop reason: SDUPTHER

## 2018-05-11 RX ORDER — PREDNISONE 20 MG/1
40 TABLET ORAL DAILY
Qty: 2 TABLET | Refills: 0 | Status: SHIPPED | OUTPATIENT
Start: 2018-05-12 | End: 2018-05-13

## 2018-05-11 RX ORDER — BENZONATATE 100 MG/1
100 CAPSULE ORAL 2 TIMES DAILY PRN
Qty: 30 CAPSULE | Refills: 3 | Status: SHIPPED | OUTPATIENT
Start: 2018-05-11 | End: 2018-05-26

## 2018-05-11 RX ORDER — FLUTICASONE FUROATE AND VILANTEROL 200; 25 UG/1; UG/1
1 POWDER RESPIRATORY (INHALATION) 2 TIMES DAILY
Qty: 60 EACH | Refills: 3 | Status: SHIPPED | OUTPATIENT
Start: 2018-05-11 | End: 2019-01-01

## 2018-05-11 RX ADMIN — PREDNISONE 40 MG: 20 TABLET ORAL at 08:05

## 2018-05-11 RX ADMIN — LISINOPRIL 10 MG: 10 TABLET ORAL at 08:05

## 2018-05-11 RX ADMIN — FLUTICASONE FUROATE AND VILANTEROL TRIFENATATE 1 PUFF: 100; 25 POWDER RESPIRATORY (INHALATION) at 11:05

## 2018-05-11 RX ADMIN — INSULIN ASPART 4 UNITS: 100 INJECTION, SOLUTION INTRAVENOUS; SUBCUTANEOUS at 11:05

## 2018-05-11 RX ADMIN — HEPARIN SODIUM 5000 UNITS: 5000 INJECTION, SOLUTION INTRAVENOUS; SUBCUTANEOUS at 08:05

## 2018-05-11 RX ADMIN — IPRATROPIUM BROMIDE AND ALBUTEROL SULFATE 3 ML: .5; 3 SOLUTION RESPIRATORY (INHALATION) at 03:05

## 2018-05-11 RX ADMIN — AMLODIPINE BESYLATE 10 MG: 5 TABLET ORAL at 08:05

## 2018-05-11 RX ADMIN — PNEUMOCOCCAL 13-VALENT CONJUGATE VACCINE 0.5 ML: 2.2; 2.2; 2.2; 2.2; 2.2; 4.4; 2.2; 2.2; 2.2; 2.2; 2.2; 2.2; 2.2 INJECTION, SUSPENSION INTRAMUSCULAR at 11:05

## 2018-05-11 RX ADMIN — IPRATROPIUM BROMIDE AND ALBUTEROL SULFATE 3 ML: .5; 3 SOLUTION RESPIRATORY (INHALATION) at 11:05

## 2018-05-11 RX ADMIN — PRAVASTATIN SODIUM 40 MG: 40 TABLET ORAL at 08:05

## 2018-05-11 NOTE — PROGRESS NOTES
Dr. DEANDRA Schulte notified of Pt.'s 9 beat run of VT . Pt. Denies pain alert VSS CBG  90 .will continue to monitor .

## 2018-05-11 NOTE — PROGRESS NOTES
Tessalon rx sent to outpatient pharmacy by Dr. Veronica. Pharmacy to call patient with price of rx and when its filled. Patient aware of rx sent. Patient discharged. Verbalized understanding of discharge instructions. PIV  Discontinued per MD orders. Catheter tip intact and pressure dressing applied.

## 2018-05-11 NOTE — DISCHARGE SUMMARY
[facility] Discharge Summary    Primary Team: [facility] Team A  Attending Physician: Dr. Cosby  Resident: Dr. Mariscal  Intern: Dr. Veronica    Date of Admit: 5/7/2018  Date of Discharge: 5/11/2018    Discharge to: home   Condition: stable     Discharge Diagnoses     Patient Active Problem List   Diagnosis    Acute asthma exacerbation/ restrictive lung disease     Acute hypoxemic respiratory failure    Essential hypertension    Type 2 diabetes mellitus, with long-term current use of insulin    MARTHA (acute kidney injury)    Hyperlipidemia    Microcytic anemia    Cough    Dyspnea      Consultants and Procedures     Consultants:  Pulmonology    Procedures:   none    Brief History of Present Illness      Tadeo Riley is a 52 y.o. male who has a past medical history of Diabetes mellitus II and Hypertension, and HLD who presented to [facility] on 5/7/2018 was in his USOH (no home 02, no cpap, walks without restriction) when he presented with Shortness of Breath, [id number] BERUMEN, and wheezing that began this AM. He endorses a yellow productive cough for the past 3 days but denies subjective fevers, chills, night sweats, n/v, myalgia, abdominal pain. Endorses substernal chest pain that occurs after coughing and resolves that is not associated with exertion. He denies CP worse with lying flat or inspiration. He endorses 1 week of increased pedal edema. Denies PND and orthopnea. Denies current h/o asthma or COPD although state he had childhood asthma. Given 500mg daily z pack at Urgent Care on Saturday for his cough without improvement. Denies sick contacts, allergies, pollen or dust exposure. Denies current smoking or 2nd hand smoke.      Pt was satting 96% on RA when EMS gave solumedrol, combivent, and albuterol.      Hospital Course By Problem with Pertinent Findings   Acute Hypoxic Respiratory Distress 2/2 likely reactive airway disease/restrictive lung disease   -SOB, wheezing and cough  for 3 days   -BNP 39, trop negative, EKG without acute ischemia  -CXR pa/lat negative  -CT a chest: No evidence of central pulmonary embolism. LE US neg for DVT  -VBG WNL, solumedrol 125mg IV in ED  -initially required bipap 2/2 sob and hypoxia  able to be weaned to NC with bipap at night. duonebs helped. Suspect allergic component to this exacerbation. Will refer to allergy for testing.   -pulm consulted, appreciate recs, will continue breo on discharge and add albuterol prn inhaled, Need sleep study, weight loss, PFTs as outpatient.   -cont prednisone 40mg daily for one more day, Moxi 400mg q24 po for one more day, p  -procal wnl, alpha 1 antitrypsin not low      MARTHA, resolved  -gfr >60, Cr 1.5 on admit  no baseline labs  -fe urea 34.8%  -UA without protein  -likely chronic 2/2 uncontrolled DM  -improved     HTN, Controlled   -BP elevated   -On home HCTZ-lisinopril 12.5-20mg  held 2/2   -started on amlodipine 10  with resolution of MARTHA, will start back lisinopril at 10 and titrate up     Uncontrolled DMII with Hyperglycemia and renal manifestations  -Patients taking 50 units BID of 70/30 Novolin   -glucose 494  -10 units insulin in ED and will give 70/30 BID in house with SSI  -a1c 9.4, blood sugar steadily improved with home regimen, suspect non-compliance with home insulin      HLD  -Cont home pravastatin 40mg     Anemia of Chronic Disease  -Hgb/Hct 11.4/34.2, MCV 80  -tibc 260 and elevated ferritin  -hgb stable without signs of bleeding     Mild Transaminits, stable   -AST/ALT 36/55  -monitor    Discharge Medications        Medication List      START taking these medications    BREO ELLIPTA 200-25 mcg/dose Dsdv diskus inhaler  Generic drug:  fluticasone-vilanterol  Inhale 1 puff into the lungs once daily     moxifloxacin 400 mg tablet  Commonly known as:  AVELOX  Take 1 tablet (400 mg total) by mouth once daily.  Start taking on:  5/12/2018     predniSONE 20 MG tablet  Commonly known as:  DELTASONE  Take 2  tablets (40 mg total) by mouth once daily.  Start taking on:  5/12/2018     VENTOLIN HFA 90 mcg/actuation inhaler  Generic drug:  albuterol  Inhale 2 puffs into the lungs every 6 (six) hours as needed for Wheezing. Rescue        CONTINUE taking these medications    amLODIPine 10 MG tablet  Commonly known as:  NORVASC     benzonatate 100 MG capsule  Commonly known as:  TESSALON  Take 1 capsule (100 mg total) by mouth 2 (two) times daily as needed for Cough.     CIALIS 10 MG tablet  Generic drug:  tadalafil     insulin NPH-insulin regular (70/30) 100 unit/mL (70-30) injection  Commonly known as:  NOVOLIN 70/30     lisinopril-hydrochlorothiazide 20-12.5 mg per tablet  Commonly known as:  PRINZIDE,ZESTORETIC     naproxen 500 MG tablet  Commonly known as:  NAPROSYN  Take 1 tablet (500 mg total) by mouth 2 (two) times daily with meals.     pravastatin 40 MG tablet  Commonly known as:  PRAVACHOL        STOP taking these medications    azithromycin 250 MG tablet  Commonly known as:  Z-DANIEL     ibuprofen 600 MG tablet  Commonly known as:  ADVIL,MOTRIN     methocarbamol 500 MG Tab  Commonly known as:  ROBAXIN           Where to Get Your Medications      These medications were sent to [facility] [address], [place] [address]    Hours:  Mon-Fri, 8a-5:30p Phone:  [phone]   · LIANNE ELLIPTA 200-25 mcg/dose Dsdv diskus inhaler  · moxifloxacin 400 mg tablet  · predniSONE 20 MG tablet  · VENTOLIN HFA 90 mcg/actuation inhaler     You can get these medications from any pharmacy    Bring a paper prescription for each of these medications  · benzonatate 100 MG capsule         Discharge Information:   Diet:  Cardiac, diabetic     Physical Activity:  As tolerated     Instructions:  1. Take all medications as prescribed  2. Keep all follow-up appointments  3. Return to the hospital or call your primary care physicians if any worsening symptoms such as shortness of breath occur.    Follow-Up  Appointments:  PCP, Pulm, Allergy     Valentina Veronica M.D.  [facility] Internal Medicine, [facility]

## 2018-05-11 NOTE — PROGRESS NOTES
"Patient removed bipap mask from face . States " I'm taking it off for the night ." Refused to replace mask . Resp Dept. Personnel notified . Will continue to monitor pt.   "

## 2018-05-11 NOTE — MEDICAL/APP STUDENT
"U IM  Medical Student - Team A Progress Note     Subjective:     Tadeo Riley is a 52 y.o. male who is being followed by the [facility] IM service at [facility] for Respiratory distress likely 2/2 underling restrictive lung disease. Overnight he did well. He is not in distress and breathing comfortably on RA. He states he feels he is ready to go home today. He denies CP, SOB, and N/V.       Objective:  Last 24 Hour Vital Signs:  BP  Min: 132/78  Max: 158/90  Temp  Av.6 °F (36.4 °C)  Min: 97.3 °F (36.3 °C)  Max: 97.9 °F (36.6 °C)  Pulse  Av.5  Min: 76  Max: 93  Resp  Av.1  Min: 16  Max: 29  SpO2  Av.2 %  Min: 91 %  Max: 98 %  I/O last 3 completed shifts:  In: 270 [P.O.:270]  Out: 1075 [Urine:1075]    Physical Examination:  /84 (BP Location: Left arm, Patient Position: Lying)   Pulse 82   Temp 97.3 °F (36.3 °C) (Oral)   Resp 18   Ht 5' 7" (1.702 m)   Wt (!) 152.1 kg (335 lb 5.1 oz)   SpO2 98%   BMI 52.52 kg/m²        General Appearance:    Alert, cooperative, no distress, appears stated age   Head:    Normocephalic, without obvious abnormality, atraumatic   Eyes:   Conjunctiva/corneas clear, EOM's intact      Throat:   Lips, mucosa, and tongue normal; teeth and gums normal   Neck:   Supple, symmetrical, trachea midline, no adenopathy;        thyroid:  No enlargement/tenderness/nodules   Back:     Symmetric, no curvature, ROM normal, no CVA tenderness   Lungs:     Mostly clear breath sounds bilaterally with improved patchy end expiratory wheezes, adequate air movement.   Chest wall:    No tenderness or deformity   Heart:    Regular rate and rhythm, S1 and S2 normal, no murmur, rub   or gallop   Abdomen:     Soft, non-tender, bowel sounds active all four quadrants,     no masses, no organomegaly   Extremities:   Extremities normal, atraumatic, no cyanosis or edema   Pulses:   2+ and symmetric all extremities   Skin:   Skin color, texture, turgor normal, no rashes or " lesions   Lymph nodes:   Cervical, supraclavicular, and axillary nodes normal   Neurologic:   Normal strength and sensation          Laboratory:  Laboratory Data Reviewed: yes  Pertinent Findings:  Lab Results   Component Value Date    WBC 14.25 (H) 05/11/2018    WBC 13.60 (H) 05/10/2018    WBC 16.10 (H) 05/09/2018       Microbiology Data Reviewed: yes  Radiology Data Reviewed: yes    Current Medications:     Infusions:       Scheduled:   albuterol-ipratropium 2.5mg-0.5mg/3mL  3 mL Nebulization Q4H    amLODIPine  10 mg Oral Daily    fluticasone-vilanterol  1 puff Inhalation Daily    heparin (porcine)  5,000 Units Subcutaneous Q12H    insulin aspart protamine-insulin aspart  55 Units Subcutaneous BID AC    insulin aspart U-100  1-10 Units Subcutaneous Q4H    lisinopril  10 mg Oral Daily    moxifloxacin  400 mg Oral Q24H    pravastatin  40 mg Oral Daily    predniSONE  40 mg Oral Daily        PRN:  dextrose 50%, dextrose 50%, glucagon (human recombinant), glucose, glucose, pneumoc 13-aniceto conj-dip cr(PF)    Antibiotics and Day Number of Therapy:  Moxifloxacin Day 4    Assessment:    Tadeo Riley is a 52 y.o.male with  Patient Active Problem List    Diagnosis Date Noted    Dyspnea 05/10/2018    COPD with acute exacerbation 05/08/2018    Acute hypoxemic respiratory failure 05/08/2018    Essential hypertension 05/08/2018    Type 2 diabetes mellitus, with long-term current use of insulin 05/08/2018    MARTHA (acute kidney injury) 05/08/2018    Hyperlipidemia 05/08/2018    Microcytic anemia 05/08/2018    Cough 05/08/2018        Plan:    Acute Hypoxic Resp Failure  - Underlying formally undiagnosed COPD likely the main issue  - Alpha-1 antitrypsyn normal, no evidence of PE  - Stable on RA  - Outpatient PFT and sleep study     HTN  - Home medication management   - Continue to monitor     DMII  - Titrate insulin regimen  - Continue SSI     Anemia  - Labs reflect chronic disease etiology most likely  - Continue  to monitor for active bleeding     Dispo: Discharge today   F/U: PCP for PFT, sleep study, HTN and anemia; Allergy/immunology for asthma.      Reinaldo Donald  Medical Student  [facility] Internal Medicine Service Team A

## 2018-05-11 NOTE — TELEPHONE ENCOUNTER
Curtis referred by [facility] for assistance with discharge medications. I attempted to qualify patient for [employer]. With a FPL of 241% he does not qualify.

## 2018-05-11 NOTE — PROGRESS NOTES
Follow-Up       Follow-up With  Details  Why  Contact Info   [facility]  In 1 week    [address] [facility] [address]  [phone]   Serafin Prather MD  On 8/23/2018  3:00 PM - YOU CAN CALL TO CHECK TO SEE IF A SOONER APT OPENS UP -- [address]   [address]  [place] [address]  [phone]   Kelsey Ng MD  On 6/25/2018  3:40 PM   [address]  [address]  [place] [address]  [phone]   [facility]    Mr. Riley wants to make own follow up apt -- will call office today - 5/11/18 does not want [place] to make apt   [address] [facility] [address]  [phone]

## 2018-05-11 NOTE — PLAN OF CARE
Problem: Patient Care Overview  Goal: Plan of Care Review  PLAN OF CARE REVIEWED WITH PT . NOTIFIED OF FREQUENCY OF CBG (Q4HR SS) SCHED NOVOLOG  . ENCOURAGED PT. TO USE BIPAP MACHINE . TELEMETRY LEADS REMAIN INTACT BOX # [id number] .SCDS IN PLACE. SAFETY PRECAUTIONS MAINTAINED . CALL LIGHT IN REACH.

## 2018-05-11 NOTE — PLAN OF CARE
Checked resting O2 sats on room air : 92%  Ambulatory on room air: 92-94%.   Meds at bedside will discharge to home.

## 2018-05-11 NOTE — PROGRESS NOTES
.Pharmacy New Medication Education    Patient accepted medication education.    Pharmacy educated patient on name and purpose of medications and possible side effects, using the teach-back method.     Current Inpatient Medication Orders   albuterol-ipratropium 2.5mg-0.5mg/3mL nebulizer solution 3 mL   amLODIPine tablet 10 mg   dextrose 50% injection 12.5 g   dextrose 50% injection 25 g   fluticasone-vilanterol 100-25 mcg/dose diskus inhaler 1 puff   glucagon (human recombinant) injection 1 mg   glucose chewable tablet 16 g   glucose chewable tablet 24 g   heparin (porcine) injection 5,000 Units   insulin aspart protamine-insulin aspart (NovoLOG 70/30) injection   insulin aspart U-100 pen 1-10 Units   lisinopril tablet 10 mg   moxifloxacin tablet 400 mg   pneumoc 13-aniceto conj-dip cr(PF) 0.5 mL   pravastatin tablet 40 mg   predniSONE tablet 40 mg       Learners of pharmacy medication education included:  Patient    Patient +/- learner response:  Verbalized Understanding, Teachback

## 2018-05-11 NOTE — PLAN OF CARE
[facility] met with pt prior to discharge   reviewed f/u apts with pt -- advised to call office periodically to see if sooner apts open up     pt insists upon making own f/u apt with pcp -- states he will call the office upon arrival to home and make own apt.   TN will fax d/c summ to office      pt's nurse to review d/c instructions/medications with pt     no hh, no dme ordered.     Follow-up With   Details   Why   Contact Info   [facility]   In 1 week       [address] [facility] [address]  [phone]   Serafin Prather MD   On 8/23/2018   3:00 PM - YOU CAN CALL TO CHECK TO SEE IF A SOONER APT OPENS UP -- [address]    [address]  [place] [address]  [phone]   Kelsey Ng MD   On 6/25/2018   3:40 PM    [address]  [address]  [place] [address]  [phone]   [facility]       Mr. Riley wants to make own follow up apt -- will call office today - 5/11/18 does not want [place] to make apt

## 2018-05-13 LAB
BACTERIA BLD CULT: NORMAL
BACTERIA BLD CULT: NORMAL

## 2018-05-14 ENCOUNTER — PATIENT OUTREACH (OUTPATIENT)
Dept: ADMINISTRATIVE | Facility: CLINIC | Age: 53
End: 2018-05-14

## 2018-05-14 NOTE — PATIENT INSTRUCTIONS
COPD Flare    You have had a flare-up of your COPD.  COPD, or chronic obstructive pulmonary disease, is a common lung disease. It causes your airways to become irritated and narrower. This makes it harder for you to breathe. Emphysema and chronic bronchitis are both types of COPD. This is a chronic condition, which means you always have it. Sometimes it gets worse. When this happens, it is called a flare-up.  Symptoms of COPD  People with COPD may have symptoms most of the time. In a flare-up, your symptoms get worse. These symptoms may mean you are having a flare-up:  · Shortness of breath, shallow or rapid breathing, or wheezing that gets worse  · Lung infection  · Cough that gets worse  · More mucus, thicker mucus or mucus of a different color  · Tiredness, decreased energy, or trouble doing your usual activities  · Fever  · Chest tightness  · Your symptoms dont get better even when you use your usual medicines, inhalers, and nebulizer  · Trouble talking  · You feel confused  Causes of flare-ups  Unfortunately, a flare-up can happen even though you did everything right, and you followed your doctors instructions. Some causes of flare-ups are:  · Smoking or secondhand smoke  · Colds, the flu, or respiratory infections  · Air pollution  · Sudden change in the weather  · Dust, irritating chemicals, or strong fumes  · Not taking your medicines as prescribed  Home care  Here are some things you can do at home to treat a flare-up:  · Try not to panic. This makes it harder to breathe, and keeps you from doing the right things.  · Dont smoke or be around others who are smoking.  · Try to drink more fluids than usual during a flare-up, unless your doctor has told you not to because of heart and kidney problems. More fluids can help loosen the mucus.  · Use your inhalers and nebulizer, if you have one, as you have been told to.  · If you were given antibiotics, take them until they are used up or your doctor tells you  to stop. Its important to finish the antibiotics, even though you feel better. This will make sure the infection has cleared.  · If you were given prednisone or another steroid, finish it even if you feel better.  Preventing a flare-up  Even though flare-ups happen, the best way to treat one is to prevent it before it starts. Here are some pointers:  · Dont smoke or be around others who are smoking.  · Take your medicines as you have been told.  · Talk with your doctor about getting a flu shot every year. Also find out if you need a pneumonia shot.  · If there is a weather advisory warning to stay indoors, try to stay inside when possible.  · Try to eat healthy and get plenty of sleep.  · Try to avoid things that usually set you off, like dust, chemical fumes, hairsprays, or strong perfumes.  Follow-up care  Follow up with your healthcare provider, or as advised.  If a culture was done, you will be told if your treatment needs to be changed. You can call as directed for the results.  If X-rays were done, you will be notified of any new findings that may affect your care.  Call 911  Call 911 if any of these occur:  · You have trouble breathing  · You feel confused or its difficult to wake you up  · You faint or lose consciousness  · You have a rapid heart rate  · You have new pain in your chest, arm, shoulder, neck or upper back  When to seek medical advice  Call your healthcare provider right away if any of these occur:  · Wheezing or shortness of breath gets worse  · You need to use your inhalers more often than usual without relief  · Fever of 100.4°F (38ºC) or higher, or as directed by your healthcare provider  · Coughing up lots of dark-colored or bloody mucus (sputum)  · Chest pain with each breath  · You do not start to get better within 24 hours  · Swelling of your ankles gets worse  · Dizziness or weakness  Date Last Reviewed: 9/1/2016  © 4716-8381 [employer]. [address] [facility],  [place] [address]. All rights reserved. This information is not intended as a substitute for professional medical care. Always follow your healthcare professional's instructions.

## 2018-06-11 DIAGNOSIS — I50.9 HEART FAILURE, UNSPECIFIED: Primary | ICD-10-CM

## 2018-06-25 ENCOUNTER — HOSPITAL ENCOUNTER (OUTPATIENT)
Dept: CARDIOLOGY | Facility: HOSPITAL | Age: 53
Discharge: HOME | End: 2018-06-25
Attending: SPECIALIST
Payer: MEDICARE

## 2018-06-25 DIAGNOSIS — I50.9 HEART FAILURE, UNSPECIFIED: ICD-10-CM

## 2018-06-25 LAB
DIASTOLIC DYSFUNCTION: NO
MITRAL VALVE MOBILITY: NORMAL
RETIRED EF AND QEF - SEE NOTES: 65 (ref 55–65)
TRICUSPID VALVE REGURGITATION: NORMAL

## 2018-06-25 PROCEDURE — 93306 TTE W/DOPPLER COMPLETE: CPT | Mod: PO

## 2018-06-25 PROCEDURE — 93306 TTE W/DOPPLER COMPLETE: CPT | Mod: 26,,, | Performed by: INTERNAL MEDICINE

## 2019-01-01 ENCOUNTER — HOSPITAL ENCOUNTER (EMERGENCY)
Facility: HOSPITAL | Age: 54
Discharge: HOME | End: 2019-01-01
Attending: SURGERY
Payer: MEDICARE

## 2019-01-01 VITALS
BODY MASS INDEX: 54 KG/M2 | SYSTOLIC BLOOD PRESSURE: 135 MMHG | TEMPERATURE: 98 F | WEIGHT: 315 LBS | RESPIRATION RATE: 37 BRPM | HEART RATE: 76 BPM | OXYGEN SATURATION: 96 % | DIASTOLIC BLOOD PRESSURE: 75 MMHG

## 2019-01-01 DIAGNOSIS — Z76.0 MEDICATION REFILL: ICD-10-CM

## 2019-01-01 DIAGNOSIS — I10 HYPERTENSION, UNSPECIFIED TYPE: Primary | ICD-10-CM

## 2019-01-01 DIAGNOSIS — R06.02 SHORTNESS OF BREATH: ICD-10-CM

## 2019-01-01 DIAGNOSIS — R60.0 LOWER LEG EDEMA: ICD-10-CM

## 2019-01-01 LAB
ALBUMIN SERPL BCP-MCNC: 3.9 G/DL
ALP SERPL-CCNC: 96 U/L
ALT SERPL W/O P-5'-P-CCNC: 29 U/L
ANION GAP SERPL CALC-SCNC: 7 MMOL/L
AST SERPL-CCNC: 17 U/L
BASOPHILS # BLD AUTO: 0.01 K/UL
BASOPHILS NFR BLD: 0.1 %
BILIRUB SERPL-MCNC: 0.4 MG/DL
BUN SERPL-MCNC: 15 MG/DL
CALCIUM SERPL-MCNC: 9.2 MG/DL
CHLORIDE SERPL-SCNC: 105 MMOL/L
CO2 SERPL-SCNC: 29 MMOL/L
CREAT SERPL-MCNC: 0.82 MG/DL
DIFFERENTIAL METHOD: ABNORMAL
EOSINOPHIL # BLD AUTO: 0.1 K/UL
EOSINOPHIL NFR BLD: 1.6 %
ERYTHROCYTE [DISTWIDTH] IN BLOOD BY AUTOMATED COUNT: 14.8 %
EST. GFR  (AFRICAN AMERICAN): >60 ML/MIN/1.73 M^2
EST. GFR  (NON AFRICAN AMERICAN): >60 ML/MIN/1.73 M^2
GLUCOSE SERPL-MCNC: 164 MG/DL
HCT VFR BLD AUTO: 36.1 %
HGB BLD-MCNC: 12.6 G/DL
INR PPP: 1.1
LYMPHOCYTES # BLD AUTO: 2.1 K/UL
LYMPHOCYTES NFR BLD: 30.9 %
MCH RBC QN AUTO: 27.9 PG
MCHC RBC AUTO-ENTMCNC: 34.9 G/DL
MCV RBC AUTO: 80 FL
MONOCYTES # BLD AUTO: 0.5 K/UL
MONOCYTES NFR BLD: 7.1 %
NEUTROPHILS # BLD AUTO: 4 K/UL
NEUTROPHILS NFR BLD: 60 %
NT-PROBNP: 101 PG/ML
PLATELET # BLD AUTO: 254 K/UL
PMV BLD AUTO: 10.5 FL
POCT GLUCOSE: 166 MG/DL (ref 70–110)
POTASSIUM SERPL-SCNC: 3.8 MMOL/L
PROT SERPL-MCNC: 7.1 G/DL
PROTHROMBIN TIME: 11.4 SEC
RBC # BLD AUTO: 4.52 M/UL
SODIUM SERPL-SCNC: 141 MMOL/L
TROPONIN I SERPL DL<=0.01 NG/ML-MCNC: <0.012 NG/ML
WBC # BLD AUTO: 6.73 K/UL

## 2019-01-01 PROCEDURE — 80053 COMPREHEN METABOLIC PANEL: CPT | Mod: ER

## 2019-01-01 PROCEDURE — 82962 GLUCOSE BLOOD TEST: CPT | Mod: ER

## 2019-01-01 PROCEDURE — 85025 COMPLETE CBC W/AUTO DIFF WBC: CPT | Mod: ER

## 2019-01-01 PROCEDURE — 99285 EMERGENCY DEPT VISIT HI MDM: CPT | Mod: 25,ER

## 2019-01-01 PROCEDURE — 93005 ELECTROCARDIOGRAM TRACING: CPT | Mod: ER

## 2019-01-01 PROCEDURE — 85610 PROTHROMBIN TIME: CPT | Mod: ER

## 2019-01-01 PROCEDURE — 93010 EKG 12-LEAD: ICD-10-PCS | Mod: ,,, | Performed by: INTERNAL MEDICINE

## 2019-01-01 PROCEDURE — 84484 ASSAY OF TROPONIN QUANT: CPT | Mod: ER

## 2019-01-01 PROCEDURE — 94760 N-INVAS EAR/PLS OXIMETRY 1: CPT | Mod: ER

## 2019-01-01 PROCEDURE — 93010 ELECTROCARDIOGRAM REPORT: CPT | Mod: ,,, | Performed by: INTERNAL MEDICINE

## 2019-01-01 PROCEDURE — 83880 ASSAY OF NATRIURETIC PEPTIDE: CPT | Mod: ER

## 2019-01-01 PROCEDURE — 25000003 PHARM REV CODE 250: Mod: ER | Performed by: PHYSICIAN ASSISTANT

## 2019-01-01 RX ORDER — LISINOPRIL AND HYDROCHLOROTHIAZIDE 12.5; 2 MG/1; MG/1
2 TABLET ORAL DAILY
Qty: 60 TABLET | Refills: 0 | Status: SHIPPED | OUTPATIENT
Start: 2019-01-01 | End: 2021-06-15

## 2019-01-01 RX ORDER — HYDROCHLOROTHIAZIDE 25 MG/1
25 TABLET ORAL
Status: COMPLETED | OUTPATIENT
Start: 2019-01-01 | End: 2019-01-01

## 2019-01-01 RX ORDER — ALBUTEROL SULFATE 90 UG/1
2 AEROSOL, METERED RESPIRATORY (INHALATION) EVERY 6 HOURS PRN
Qty: 18 G | Refills: 6 | Status: SHIPPED | OUTPATIENT
Start: 2019-01-01 | End: 2024-02-20

## 2019-01-01 RX ORDER — LISINOPRIL 20 MG/1
40 TABLET ORAL
Status: DISCONTINUED | OUTPATIENT
Start: 2019-01-01 | End: 2019-01-01

## 2019-01-01 RX ORDER — CARVEDILOL 12.5 MG/1
12.5 TABLET ORAL DAILY
COMMUNITY
End: 2021-06-15 | Stop reason: SDUPTHER

## 2019-01-01 RX ADMIN — HYDROCHLOROTHIAZIDE 25 MG: 25 TABLET ORAL at 10:01
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
## 2019-01-08 DIAGNOSIS — M54.50 LOW BACK PAIN: Primary | ICD-10-CM

## 2019-01-14 ENCOUNTER — CLINICAL SUPPORT (OUTPATIENT)
Dept: REHABILITATION | Facility: HOSPITAL | Age: 54
End: 2019-01-14
Attending: SPECIALIST
Payer: MEDICARE

## 2019-01-14 DIAGNOSIS — R29.898 WEAKNESS OF BOTH LOWER EXTREMITIES: ICD-10-CM

## 2019-01-14 DIAGNOSIS — M62.9 HAMSTRING TIGHTNESS OF BOTH LOWER EXTREMITIES: ICD-10-CM

## 2019-01-14 DIAGNOSIS — M53.86 DECREASED RANGE OF MOTION OF LUMBAR SPINE: ICD-10-CM

## 2019-01-14 DIAGNOSIS — R29.898 HIP TIGHTNESS: ICD-10-CM

## 2019-01-14 PROCEDURE — 97162 PT EVAL MOD COMPLEX 30 MIN: CPT | Mod: PO,ER

## 2019-01-14 PROCEDURE — 97110 THERAPEUTIC EXERCISES: CPT | Mod: PO,ER

## 2019-01-14 NOTE — PATIENT INSTRUCTIONS
Supine: Leg Stretch With Strap (Intermediate)        Lie on back with one knee bent, foot flat on floor. Hook strap around other foot. Straighten knee. Raise leg further toward its maximal range. Hold 10 seconds. Relax leg completely down to floor.   Repeat 10 times per session. Do 2 sessions per day.    Copyright © [employer]. All rights reserved.   Supine With Rotation        Lie, back flat, legs bent, feet together. Rotate knees to one side. Repeat to other side.  Repeat 10 times per session. Do 2 sessions per day.    Copyright © [employer]. All rights reserved.   Isometric Stabilization        Tighten abdominal muscles as if tightening a belt. Hold 5 seconds.  Do 10 times, 2 times per day.     [email or web address]     Copyright © [employer]. All rights reserved.

## 2019-01-14 NOTE — PLAN OF CARE
[facility]  Physical Therapy Initial Evaluation    Name: Tadeo Riley  Clinic Number: [id number]    Therapy Diagnosis:   Encounter Diagnoses   Name Primary?    Decreased range of motion of lumbar spine     Hamstring tightness of both lower extremities     Weakness of both lower extremities     Hip tightness      Physician: Jena Malhotra MD    Physician Orders: PT Eval and Treat   Medical Diagnosis from Referral: Low back pain  Evaluation Date: 1/14/2019  Authorization Period Expiration: 12/31/2019  Plan of Care Expiration: 03/14/2019  Visit # / Visits authorized: 1/ 20    Time In: 2:45  Time Out: 3:45  Total Billable Time: 60 minutes    Precautions: Standard    Subjective   Date of onset: 3 weeks ago  History of current condition - Tadeo reports: insidious onset of pain 3 weeks ago. Currently he has trouble putting on socks sometimes his wife has to help him. He also has trouble getting up from his sofa and recliner due to pain, performing his usual yard work increases his pain, and he has increased stiffness and pain after sitting or resting for awhile. He denies any difficulty stepping over the side of the tub, with vehicle transfers, or driving.      Medical History:   Past Medical History:   Diagnosis Date    Diabetes mellitus     Hypertension     Obesity        Surgical History:   Tadeo Riley  has a past surgical history that includes Joint replacement (Right, 1985).    Medications:   Tadeo has a current medication list which includes the following prescription(s): albuterol, carvedilol, insulin nph-insulin regular (70/30), lisinopril-hydrochlorothiazide, and tadalafil.    Allergies:   Review of patient's allergies indicates:   Allergen Reactions    Metformin Hives    Benadryl [diphenhydramine hcl] Rash        Imaging,  no recent imaging    Prior Therapy: PT previously for hip  Social History: single story home, 3 steps in front the home with 1 handrail lives with  their family  Occupation: unemployed   Prior Level of Function: Mod I  Current Level of Function: Min A with dressing lower body    Pain:  Current 6/10, worst 8/10, best 4/10   Location: bilateral back   Description: Sharp and sticking  Aggravating Factors: Standing, Bending, Walking and Getting out of bed/chair  Easing Factors: pain medication, lying down, hot bath and rest    Pts goals: to get comfortable    Objective     Observation: Patient is a 53 year old male, who presents to the clinic in no apparent distress.     Posture:  R LE In ER in standing, increased lumbar lordosis in standing    Lumbar Range of Motion:    Degrees Pain   Flexion 50 Pain R lumbar   Extension 5 Tight R   Left Side Bending 15    Right Side Bending 15 Tight R side        Lower Extremity Strength  Right LE  Left LE    Knee extension: 4/5 Knee extension: 5/5   Knee flexion: 5/5 Knee flexion: 5/5   Hip flexion: 3/5 Hip flexion: 3/5   Hip extension:  3/5 Hip extension: 3/5   Hip abduction: 3/5 Hip abduction: 3/5   Hip adduction: 3/5 Hip adduction 3/5   Ankle dorsiflexion: 5/5 Ankle dorsiflexion: 5/5   Ankle plantarflexion: 5/5 Ankle plantarflexion: 5/5       Special Tests:  -Repeated Flexion: no change  -Repeated Ext: no change  -Prone Instability Test: positive    DTR:   Right Left Comment   Patellar (L3-4) 1+ 1+    Achilles (S1) 1+ 1+        Neuro Dynamic Testing:    Sciatic nerve:      SLR: R = 50 degrees     L = 55 degrees      Joint Mobility: difficulty to accurately assess PIVM due to patient's size.    Palpation: TTP over R SI joint, lumbar spinous processes, R QL    Sensation: light touch intact    Flexibility:    Popliteal Angle: R = 40 degrees ; L = 40 degrees   Hernan's test: R = positive ; L = positive           CMS Impairment/Limitation/Restriction for FOTO Lumbar Spine Survey    Therapist reviewed FOTO scores for Tadeo Riley on 1/14/2019.   FOTO documents entered into [employer] - see Media section.    Limitation Score:  37%  Category: Mobility    Current : CJ = at least 20% but < 40% impaired, limited or restricted  Goal: CI = at least 1% but < 20% impaired, limited or restricted  Discharge: N/A at this time         TREATMENT   Treatment Time In: 3:30  Treatment Time Out: 3:45  Total Treatment time separate from Evaluation: 15 minutes    Tadeo received therapeutic exercises to develop ROM, flexibility and core stabilization for 15 minutes including:  HS stretch with strap, LTR, TAs    Home Exercises and Patient Education Provided    Education provided:   - compliance with HEP.  - use of lumbar roll when driving to improve posture    Written Home Exercises Provided: yes.  Exercises were reviewed and Tadeo was able to demonstrate them prior to the end of the session.  Tadeo demonstrated fair  understanding of the education provided.     See EMR under Patient Instructions for exercises provided 1/14/2019.    Assessment   Tadeo is a 53 y.o. male referred to outpatient Physical Therapy with a medical diagnosis of Low back pain. Pt presents with decreased lumbar range of motion, weakness of both lower extremities, hamstring tightness of both lower extremities, and hip tightness of both lower extremities. His weakness, decreased range of motion, and muscle tightness limit his ability to perform his usual yard work, dressing his lower body, and performing sit to stand transfers. His current score on FOTO Lumbar Spine places him in the 20%<40% impaired, limited, or restricted category.    Pt prognosis is Fair.   Pt will benefit from skilled outpatient Physical Therapy to address the deficits stated above and in the chart below, provide pt/family education, and to maximize pt's level of independence.     Plan of care discussed with patient: Yes  Pt's spiritual, cultural and educational needs considered and patient is agreeable to the plan of care and goals as stated below:     Anticipated Barriers for therapy: None    Medical  Necessity is demonstrated by the following  History  Co-morbidities and personal factors that may impact the plan of care Co-morbidities:   COPD/asthma, diabetes and prior hip surgery    Personal Factors:   no deficits     moderate   Examination  Body Structures and Functions, activity limitations and participation restrictions that may impact the plan of care Body Regions:   back  lower extremities    Body Systems:    ROM  strength  flexibility    Participation Restrictions:   Difficulty with dressing lower body, sit to stand transfers, and performing yard work    Activity limitations:   Learning and applying knowledge  no deficits    General Tasks and Commands  no deficits    Communication  no deficits    Mobility  lifting and carrying objects    Self care  dressing    Domestic Life  yard work    Interactions/Relationships  no deficits    Life Areas  no deficits    Community and Social Life  recreation and leisure         moderate   Clinical Presentation evolving clinical presentation with changing clinical characteristics moderate   Decision Making/ Complexity Score: moderate     Goals:  Short Term Goals: 4 weeks   1. This patient will be independent with a basic HEP.  2. This patient will have lumbar AROM WNL in order to dress his lower body independently.  3. This patient will increase B LE strength by 1 grade in order to be able to perform sit to stand from his recliner with no increase in symptoms.  4. This patient will have a pain rating of 5/10 at worst with ADLs.  5. Patient able to score greater than or equal to 70 on the FOTO Lumbar Spine Survey placing patient in 20%<40% impaired, limited, or restricted category demonstrating overall decreased low back pain with functional activities.   Long Term Goals: 8 weeks   1. This patient will be independent with an updated HEP.  2. This patient will increase B SLR by 20 degrees in order use a golfer's lift to  alight object from the ground when performing  yard work.  3. This patient will increase B LE strength to 5/5 in order to be able to perform yard work with no increase in symptoms.  4. This patient will have a pain rating of 3/10 at worst with ADLs.  5. Patient able to score greater than or equal to 80 on the FOTO Lumbar Spine Survey placing patient in 20%<40% impaired, limited, or restricted category demonstrating overall decreased low back pain with functional activities.     Plan   Plan of care Certification: 1/14/2019 to 03/14/2019.    Outpatient Physical Therapy 2 times weekly for 8 weeks to include the following interventions: Gait Training, Manual Therapy, Moist Heat/ Ice, Neuromuscular Re-ed, Patient Education, Therapeutic Exercise and IASTM. Dry needling with manual therapy techniques to decrease pain, inflammation and swelling, increase circulation and promote healing process      Lissette Willis, PT

## 2019-01-17 PROBLEM — R29.898 WEAKNESS OF BOTH LOWER EXTREMITIES: Status: ACTIVE | Noted: 2019-01-17

## 2019-01-17 PROBLEM — R29.898 HIP TIGHTNESS: Status: ACTIVE | Noted: 2019-01-17

## 2019-01-17 PROBLEM — M62.9 HAMSTRING TIGHTNESS OF BOTH LOWER EXTREMITIES: Status: ACTIVE | Noted: 2019-01-17

## 2019-01-17 PROBLEM — M53.86 DECREASED RANGE OF MOTION OF LUMBAR SPINE: Status: ACTIVE | Noted: 2019-01-17

## 2019-03-03 ENCOUNTER — HOSPITAL ENCOUNTER (EMERGENCY)
Facility: HOSPITAL | Age: 54
Discharge: HOME | End: 2019-03-03
Attending: SURGERY
Payer: MEDICARE

## 2019-03-03 VITALS
DIASTOLIC BLOOD PRESSURE: 72 MMHG | RESPIRATION RATE: 22 BRPM | SYSTOLIC BLOOD PRESSURE: 158 MMHG | WEIGHT: 315 LBS | HEART RATE: 80 BPM | BODY MASS INDEX: 49.44 KG/M2 | TEMPERATURE: 98 F | HEIGHT: 67 IN | OXYGEN SATURATION: 97 %

## 2019-03-03 DIAGNOSIS — S20.211A CONTUSION OF RIB ON RIGHT SIDE, INITIAL ENCOUNTER: Primary | ICD-10-CM
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
## 2019-03-03 DIAGNOSIS — M54.6 ACUTE BILATERAL THORACIC BACK PAIN: ICD-10-CM

## 2019-03-03 DIAGNOSIS — S83.8X1A SPRAIN OF OTHER LIGAMENT OF RIGHT KNEE, INITIAL ENCOUNTER: ICD-10-CM

## 2019-03-03 PROCEDURE — 99284 EMERGENCY DEPT VISIT MOD MDM: CPT | Mod: 25,ER

## 2019-03-03 PROCEDURE — 96372 THER/PROPH/DIAG INJ SC/IM: CPT | Mod: ER

## 2019-03-03 PROCEDURE — 63600175 PHARM REV CODE 636 W HCPCS: Mod: ER | Performed by: PHYSICIAN ASSISTANT

## 2019-03-03 RX ORDER — CYCLOBENZAPRINE HCL 10 MG
10 TABLET ORAL 3 TIMES DAILY PRN
Qty: 15 TABLET | Refills: 0 | Status: SHIPPED | OUTPATIENT
Start: 2019-03-03 | End: 2019-03-08

## 2019-03-03 RX ORDER — KETOROLAC TROMETHAMINE 10 MG/1
10 TABLET, FILM COATED ORAL EVERY 6 HOURS
Qty: 10 TABLET | Refills: 0 | OUTPATIENT
Start: 2019-03-03 | End: 2021-02-10

## 2019-03-03 RX ORDER — KETOROLAC TROMETHAMINE 30 MG/ML
30 INJECTION, SOLUTION INTRAMUSCULAR; INTRAVENOUS
Status: COMPLETED | OUTPATIENT
Start: 2019-03-03 | End: 2019-03-03

## 2019-03-03 RX ADMIN — KETOROLAC TROMETHAMINE 30 MG: 30 INJECTION, SOLUTION INTRAMUSCULAR at 07:03

## 2019-03-04 NOTE — DISCHARGE INSTRUCTIONS
Your chest x-ray did not reveal any evidence of rib fracture or intrathoracic injury. The x-ray of your knee did not reveal any evidence of fracture or dislocation.    You are advised to follow up with your primary care provider for re-evaluation within 3 days.  You are instructed to return to the emergency department immediately for any new or worsening symptoms.
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
## 2019-03-04 NOTE — ED NOTES
Crutches not given d/t maximum weight limit of 300lbs and pt weight 315-320lbs; ACE wrap applied and tolerated well

## 2019-04-11 ENCOUNTER — HOSPITAL ENCOUNTER (EMERGENCY)
Facility: HOSPITAL | Age: 54
Discharge: HOME | End: 2019-04-11
Attending: FAMILY MEDICINE
Payer: MEDICARE

## 2019-04-11 VITALS
HEIGHT: 70 IN | WEIGHT: 315 LBS | SYSTOLIC BLOOD PRESSURE: 139 MMHG | RESPIRATION RATE: 21 BRPM | BODY MASS INDEX: 45.1 KG/M2 | DIASTOLIC BLOOD PRESSURE: 81 MMHG | HEART RATE: 81 BPM | TEMPERATURE: 98 F | OXYGEN SATURATION: 97 %

## 2019-04-11 DIAGNOSIS — I50.9 ACUTE ON CHRONIC CONGESTIVE HEART FAILURE, UNSPECIFIED HEART FAILURE TYPE: Primary | ICD-10-CM

## 2019-04-11 DIAGNOSIS — R06.02 SHORTNESS OF BREATH: ICD-10-CM

## 2019-04-11 LAB
ALBUMIN SERPL BCP-MCNC: 3.7 G/DL (ref 3.5–5.2)
ALP SERPL-CCNC: 105 U/L (ref 38–126)
ALT SERPL W/O P-5'-P-CCNC: 23 U/L (ref 10–44)
ANION GAP SERPL CALC-SCNC: 3 MMOL/L (ref 8–16)
AST SERPL-CCNC: 18 U/L (ref 15–46)
BASOPHILS # BLD AUTO: 0.01 K/UL (ref 0–0.2)
BASOPHILS NFR BLD: 0.1 % (ref 0–1.9)
BILIRUB SERPL-MCNC: 0.2 MG/DL (ref 0.1–1)
BUN SERPL-MCNC: 12 MG/DL (ref 2–20)
CALCIUM SERPL-MCNC: 9 MG/DL (ref 8.7–10.5)
CHLORIDE SERPL-SCNC: 105 MMOL/L (ref 95–110)
CO2 SERPL-SCNC: 28 MMOL/L (ref 23–29)
CREAT SERPL-MCNC: 0.74 MG/DL (ref 0.5–1.4)
DIFFERENTIAL METHOD: ABNORMAL
EOSINOPHIL # BLD AUTO: 0.2 K/UL (ref 0–0.5)
EOSINOPHIL NFR BLD: 2.4 % (ref 0–8)
ERYTHROCYTE [DISTWIDTH] IN BLOOD BY AUTOMATED COUNT: 15 % (ref 11.5–14.5)
EST. GFR  (AFRICAN AMERICAN): >60 ML/MIN/1.73 M^2
EST. GFR  (NON AFRICAN AMERICAN): >60 ML/MIN/1.73 M^2
GLUCOSE SERPL-MCNC: 223 MG/DL (ref 70–110)
HCT VFR BLD AUTO: 35.1 % (ref 40–54)
HGB BLD-MCNC: 12 G/DL (ref 14–18)
INR PPP: 1 (ref 0.8–1.2)
LYMPHOCYTES # BLD AUTO: 2.4 K/UL (ref 1–4.8)
LYMPHOCYTES NFR BLD: 30 % (ref 18–48)
MCH RBC QN AUTO: 27.3 PG (ref 27–31)
MCHC RBC AUTO-ENTMCNC: 34.2 G/DL (ref 32–36)
MCV RBC AUTO: 80 FL (ref 82–98)
MONOCYTES # BLD AUTO: 0.5 K/UL (ref 0.3–1)
MONOCYTES NFR BLD: 5.9 % (ref 4–15)
NEUTROPHILS # BLD AUTO: 4.8 K/UL (ref 1.8–7.7)
NEUTROPHILS NFR BLD: 61.2 % (ref 38–73)
NT-PROBNP: 63 PG/ML (ref 5–900)
PLATELET # BLD AUTO: 255 K/UL (ref 150–350)
PMV BLD AUTO: 10.6 FL (ref 9.2–12.9)
POTASSIUM SERPL-SCNC: 3.7 MMOL/L (ref 3.5–5.1)
PROT SERPL-MCNC: 6.7 G/DL (ref 6–8.4)
PROTHROMBIN TIME: 10.5 SEC (ref 9–12.5)
RBC # BLD AUTO: 4.4 M/UL (ref 4.6–6.2)
SODIUM SERPL-SCNC: 136 MMOL/L (ref 136–145)
TROPONIN I SERPL DL<=0.01 NG/ML-MCNC: <0.012 NG/ML (ref 0.01–0.03)
WBC # BLD AUTO: 7.84 K/UL (ref 3.9–12.7)

## 2019-04-11 PROCEDURE — 94640 AIRWAY INHALATION TREATMENT: CPT | Mod: ER

## 2019-04-11 PROCEDURE — 99285 EMERGENCY DEPT VISIT HI MDM: CPT | Mod: 25,ER

## 2019-04-11 PROCEDURE — 85025 COMPLETE CBC W/AUTO DIFF WBC: CPT | Mod: ER

## 2019-04-11 PROCEDURE — 25000242 PHARM REV CODE 250 ALT 637 W/ HCPCS: Mod: ER | Performed by: FAMILY MEDICINE

## 2019-04-11 PROCEDURE — 93010 ELECTROCARDIOGRAM REPORT: CPT | Mod: ,,, | Performed by: INTERNAL MEDICINE

## 2019-04-11 PROCEDURE — 84484 ASSAY OF TROPONIN QUANT: CPT | Mod: ER

## 2019-04-11 PROCEDURE — 96374 THER/PROPH/DIAG INJ IV PUSH: CPT | Mod: ER

## 2019-04-11 PROCEDURE — 85610 PROTHROMBIN TIME: CPT | Mod: ER

## 2019-04-11 PROCEDURE — 93005 ELECTROCARDIOGRAM TRACING: CPT | Mod: ER

## 2019-04-11 PROCEDURE — 83880 ASSAY OF NATRIURETIC PEPTIDE: CPT | Mod: ER

## 2019-04-11 PROCEDURE — 63600175 PHARM REV CODE 636 W HCPCS: Mod: ER | Performed by: FAMILY MEDICINE

## 2019-04-11 PROCEDURE — 93010 EKG 12-LEAD: ICD-10-PCS | Mod: ,,, | Performed by: INTERNAL MEDICINE

## 2019-04-11 PROCEDURE — 80053 COMPREHEN METABOLIC PANEL: CPT | Mod: ER

## 2019-04-11 PROCEDURE — 27000221 HC OXYGEN, UP TO 24 HOURS: Mod: ER

## 2019-04-11 RX ORDER — FUROSEMIDE 20 MG/1
20 TABLET ORAL DAILY
Qty: 5 TABLET | Refills: 0 | Status: SHIPPED | OUTPATIENT
Start: 2019-04-11 | End: 2021-02-10

## 2019-04-11 RX ORDER — IPRATROPIUM BROMIDE AND ALBUTEROL SULFATE 2.5; .5 MG/3ML; MG/3ML
3 SOLUTION RESPIRATORY (INHALATION)
Status: COMPLETED | OUTPATIENT
Start: 2019-04-11 | End: 2019-04-11

## 2019-04-11 RX ORDER — FUROSEMIDE 10 MG/ML
60 INJECTION INTRAMUSCULAR; INTRAVENOUS
Status: COMPLETED | OUTPATIENT
Start: 2019-04-11 | End: 2019-04-11

## 2019-04-11 RX ADMIN — IPRATROPIUM BROMIDE AND ALBUTEROL SULFATE 3 ML: .5; 3 SOLUTION RESPIRATORY (INHALATION) at 04:04

## 2019-04-11 RX ADMIN — FUROSEMIDE 60 MG: 10 INJECTION, SOLUTION INTRAMUSCULAR; INTRAVENOUS at 04:04
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
## 2019-04-14 ENCOUNTER — HOSPITAL ENCOUNTER (EMERGENCY)
Facility: HOSPITAL | Age: 54
Discharge: HOME | End: 2019-04-14
Attending: EMERGENCY MEDICINE
Payer: MEDICARE

## 2019-04-14 DIAGNOSIS — R06.02 SOB (SHORTNESS OF BREATH): ICD-10-CM

## 2019-04-14 DIAGNOSIS — R06.2 WHEEZING: ICD-10-CM

## 2019-04-14 DIAGNOSIS — J45.21 MILD INTERMITTENT ASTHMA WITH ACUTE EXACERBATION: Primary | ICD-10-CM

## 2019-04-14 DIAGNOSIS — R06.02 SHORTNESS OF BREATH: ICD-10-CM

## 2019-04-14 LAB
ALBUMIN SERPL BCP-MCNC: 3.9 G/DL (ref 3.5–5.2)
ALP SERPL-CCNC: 98 U/L (ref 38–126)
ALT SERPL W/O P-5'-P-CCNC: 20 U/L (ref 10–44)
ANION GAP SERPL CALC-SCNC: 6 MMOL/L (ref 8–16)
AST SERPL-CCNC: 16 U/L (ref 15–46)
BASOPHILS # BLD AUTO: 0.02 K/UL (ref 0–0.2)
BASOPHILS NFR BLD: 0.2 % (ref 0–1.9)
BILIRUB SERPL-MCNC: 0.3 MG/DL (ref 0.1–1)
BUN SERPL-MCNC: 17 MG/DL (ref 2–20)
CALCIUM SERPL-MCNC: 8.8 MG/DL (ref 8.7–10.5)
CHLORIDE SERPL-SCNC: 106 MMOL/L (ref 95–110)
CO2 SERPL-SCNC: 27 MMOL/L (ref 23–29)
CREAT SERPL-MCNC: 0.86 MG/DL (ref 0.5–1.4)
DIFFERENTIAL METHOD: ABNORMAL
EOSINOPHIL # BLD AUTO: 0.2 K/UL (ref 0–0.5)
EOSINOPHIL NFR BLD: 2.7 % (ref 0–8)
ERYTHROCYTE [DISTWIDTH] IN BLOOD BY AUTOMATED COUNT: 15 % (ref 11.5–14.5)
EST. GFR  (AFRICAN AMERICAN): >60 ML/MIN/1.73 M^2
EST. GFR  (NON AFRICAN AMERICAN): >60 ML/MIN/1.73 M^2
GLUCOSE SERPL-MCNC: 123 MG/DL (ref 70–110)
HCT VFR BLD AUTO: 35.6 % (ref 40–54)
HGB BLD-MCNC: 12.2 G/DL (ref 14–18)
LYMPHOCYTES # BLD AUTO: 2.7 K/UL (ref 1–4.8)
LYMPHOCYTES NFR BLD: 31.3 % (ref 18–48)
MCH RBC QN AUTO: 27.2 PG (ref 27–31)
MCHC RBC AUTO-ENTMCNC: 34.3 G/DL (ref 32–36)
MCV RBC AUTO: 80 FL (ref 82–98)
MONOCYTES # BLD AUTO: 0.5 K/UL (ref 0.3–1)
MONOCYTES NFR BLD: 5.6 % (ref 4–15)
NEUTROPHILS # BLD AUTO: 5.2 K/UL (ref 1.8–7.7)
NEUTROPHILS NFR BLD: 59.9 % (ref 38–73)
NT-PROBNP: 69 PG/ML (ref 5–900)
PLATELET # BLD AUTO: 287 K/UL (ref 150–350)
PMV BLD AUTO: 10.4 FL (ref 9.2–12.9)
POTASSIUM SERPL-SCNC: 3.7 MMOL/L (ref 3.5–5.1)
PROT SERPL-MCNC: 7.1 G/DL (ref 6–8.4)
RBC # BLD AUTO: 4.48 M/UL (ref 4.6–6.2)
SODIUM SERPL-SCNC: 139 MMOL/L (ref 136–145)
TROPONIN I SERPL DL<=0.01 NG/ML-MCNC: <0.012 NG/ML (ref 0.01–0.03)
WBC # BLD AUTO: 8.62 K/UL (ref 3.9–12.7)

## 2019-04-14 PROCEDURE — 84484 ASSAY OF TROPONIN QUANT: CPT | Mod: ER

## 2019-04-14 PROCEDURE — 96374 THER/PROPH/DIAG INJ IV PUSH: CPT | Mod: ER

## 2019-04-14 PROCEDURE — 27000221 HC OXYGEN, UP TO 24 HOURS: Mod: ER

## 2019-04-14 PROCEDURE — 93010 EKG 12-LEAD: ICD-10-PCS | Mod: ,,, | Performed by: INTERNAL MEDICINE

## 2019-04-14 PROCEDURE — 80053 COMPREHEN METABOLIC PANEL: CPT | Mod: ER

## 2019-04-14 PROCEDURE — 63600175 PHARM REV CODE 636 W HCPCS: Mod: ER | Performed by: EMERGENCY MEDICINE

## 2019-04-14 PROCEDURE — 83880 ASSAY OF NATRIURETIC PEPTIDE: CPT | Mod: ER

## 2019-04-14 PROCEDURE — 25000242 PHARM REV CODE 250 ALT 637 W/ HCPCS: Mod: ER | Performed by: EMERGENCY MEDICINE

## 2019-04-14 PROCEDURE — 99285 EMERGENCY DEPT VISIT HI MDM: CPT | Mod: 25,ER

## 2019-04-14 PROCEDURE — 85025 COMPLETE CBC W/AUTO DIFF WBC: CPT | Mod: ER

## 2019-04-14 PROCEDURE — 93010 ELECTROCARDIOGRAM REPORT: CPT | Mod: ,,, | Performed by: INTERNAL MEDICINE

## 2019-04-14 PROCEDURE — 93005 ELECTROCARDIOGRAM TRACING: CPT | Mod: ER

## 2019-04-14 PROCEDURE — 94640 AIRWAY INHALATION TREATMENT: CPT | Mod: ER

## 2019-04-14 RX ORDER — PREDNISONE 20 MG/1
40 TABLET ORAL DAILY
Qty: 10 TABLET | Refills: 0 | Status: SHIPPED | OUTPATIENT
Start: 2019-04-14 | End: 2019-04-19

## 2019-04-14 RX ORDER — METHYLPREDNISOLONE SOD SUCC 125 MG
125 VIAL (EA) INJECTION
Status: COMPLETED | OUTPATIENT
Start: 2019-04-14 | End: 2019-04-14

## 2019-04-14 RX ORDER — IPRATROPIUM BROMIDE AND ALBUTEROL SULFATE 2.5; .5 MG/3ML; MG/3ML
3 SOLUTION RESPIRATORY (INHALATION)
Status: COMPLETED | OUTPATIENT
Start: 2019-04-14 | End: 2019-04-14

## 2019-04-14 RX ORDER — IPRATROPIUM BROMIDE AND ALBUTEROL SULFATE 2.5; .5 MG/3ML; MG/3ML
SOLUTION RESPIRATORY (INHALATION)
Status: DISPENSED
Start: 2019-04-14 | End: 2019-04-15

## 2019-04-14 RX ADMIN — IPRATROPIUM BROMIDE AND ALBUTEROL SULFATE 3 ML: .5; 3 SOLUTION RESPIRATORY (INHALATION) at 07:04

## 2019-04-14 RX ADMIN — METHYLPREDNISOLONE SODIUM SUCCINATE 125 MG: 125 INJECTION, POWDER, FOR SOLUTION INTRAMUSCULAR; INTRAVENOUS at 06:04

## 2019-04-15 VITALS
BODY MASS INDEX: 49.44 KG/M2 | SYSTOLIC BLOOD PRESSURE: 150 MMHG | HEART RATE: 84 BPM | OXYGEN SATURATION: 97 % | RESPIRATION RATE: 16 BRPM | HEIGHT: 67 IN | DIASTOLIC BLOOD PRESSURE: 80 MMHG | WEIGHT: 315 LBS | TEMPERATURE: 98 F
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
## 2019-05-13 ENCOUNTER — TELEPHONE (OUTPATIENT)
Dept: GASTROENTEROLOGY | Facility: CLINIC | Age: 54
End: 2019-05-13

## 2019-05-13 NOTE — TELEPHONE ENCOUNTER
Referral was sent  to schedule a Colonoscopy, left an voicemail for patient to call the office back in regards to scheduling procedure.

## 2019-05-27 ENCOUNTER — TELEPHONE (OUTPATIENT)
Dept: GASTROENTEROLOGY | Facility: CLINIC | Age: 54
End: 2019-05-27

## 2019-08-16 ENCOUNTER — HOSPITAL ENCOUNTER (EMERGENCY)
Facility: HOSPITAL | Age: 54
Discharge: HOME | End: 2019-08-16
Attending: EMERGENCY MEDICINE
Payer: MEDICARE

## 2019-08-16 VITALS
HEART RATE: 91 BPM | RESPIRATION RATE: 22 BRPM | BODY MASS INDEX: 56.59 KG/M2 | OXYGEN SATURATION: 95 % | DIASTOLIC BLOOD PRESSURE: 83 MMHG | SYSTOLIC BLOOD PRESSURE: 154 MMHG | WEIGHT: 315 LBS | TEMPERATURE: 98 F

## 2019-08-16 DIAGNOSIS — R06.02 SHORTNESS OF BREATH: Primary | ICD-10-CM

## 2019-08-16 LAB
ALBUMIN SERPL BCP-MCNC: 3.5 G/DL (ref 3.5–5.2)
ALP SERPL-CCNC: 99 U/L (ref 55–135)
ALT SERPL W/O P-5'-P-CCNC: 17 U/L (ref 10–44)
ANION GAP SERPL CALC-SCNC: 11 MMOL/L (ref 8–16)
AST SERPL-CCNC: 17 U/L (ref 10–40)
BASOPHILS # BLD AUTO: 0.01 K/UL (ref 0–0.2)
BASOPHILS NFR BLD: 0.2 % (ref 0–1.9)
BILIRUB SERPL-MCNC: 0.2 MG/DL (ref 0.1–1)
BNP SERPL-MCNC: 24 PG/ML (ref 0–99)
BUN SERPL-MCNC: 13 MG/DL (ref 6–20)
CALCIUM SERPL-MCNC: 9.3 MG/DL (ref 8.7–10.5)
CHLORIDE SERPL-SCNC: 100 MMOL/L (ref 95–110)
CO2 SERPL-SCNC: 28 MMOL/L (ref 23–29)
CREAT SERPL-MCNC: 1 MG/DL (ref 0.5–1.4)
DIFFERENTIAL METHOD: ABNORMAL
EOSINOPHIL # BLD AUTO: 0.1 K/UL (ref 0–0.5)
EOSINOPHIL NFR BLD: 2.3 % (ref 0–8)
ERYTHROCYTE [DISTWIDTH] IN BLOOD BY AUTOMATED COUNT: 14.8 % (ref 11.5–14.5)
EST. GFR  (AFRICAN AMERICAN): >60 ML/MIN/1.73 M^2
EST. GFR  (NON AFRICAN AMERICAN): >60 ML/MIN/1.73 M^2
GLUCOSE SERPL-MCNC: 233 MG/DL (ref 70–110)
HCT VFR BLD AUTO: 36.2 % (ref 40–54)
HGB BLD-MCNC: 12 G/DL (ref 14–18)
INR PPP: 0.9 (ref 0.8–1.2)
LYMPHOCYTES # BLD AUTO: 2 K/UL (ref 1–4.8)
LYMPHOCYTES NFR BLD: 32 % (ref 18–48)
MCH RBC QN AUTO: 27.1 PG (ref 27–31)
MCHC RBC AUTO-ENTMCNC: 33.1 G/DL (ref 32–36)
MCV RBC AUTO: 82 FL (ref 82–98)
MONOCYTES # BLD AUTO: 0.4 K/UL (ref 0.3–1)
MONOCYTES NFR BLD: 5.8 % (ref 4–15)
NEUTROPHILS # BLD AUTO: 3.7 K/UL (ref 1.8–7.7)
NEUTROPHILS NFR BLD: 59.7 % (ref 38–73)
PLATELET # BLD AUTO: 258 K/UL (ref 150–350)
PMV BLD AUTO: 10.4 FL (ref 9.2–12.9)
POCT GLUCOSE: 235 MG/DL (ref 70–110)
POTASSIUM SERPL-SCNC: 3.7 MMOL/L (ref 3.5–5.1)
PROT SERPL-MCNC: 6.9 G/DL (ref 6–8.4)
PROTHROMBIN TIME: 9.4 SEC (ref 9–12.5)
RBC # BLD AUTO: 4.42 M/UL (ref 4.6–6.2)
SODIUM SERPL-SCNC: 139 MMOL/L (ref 136–145)
TROPONIN I SERPL DL<=0.01 NG/ML-MCNC: 0.01 NG/ML (ref 0–0.03)
WBC # BLD AUTO: 6.21 K/UL (ref 3.9–12.7)

## 2019-08-16 PROCEDURE — 80053 COMPREHEN METABOLIC PANEL: CPT

## 2019-08-16 PROCEDURE — 99284 EMERGENCY DEPT VISIT MOD MDM: CPT | Mod: 25

## 2019-08-16 PROCEDURE — 93005 ELECTROCARDIOGRAM TRACING: CPT

## 2019-08-16 PROCEDURE — 85610 PROTHROMBIN TIME: CPT

## 2019-08-16 PROCEDURE — 63600175 PHARM REV CODE 636 W HCPCS: Performed by: EMERGENCY MEDICINE

## 2019-08-16 PROCEDURE — 93010 EKG 12-LEAD: ICD-10-PCS | Mod: ,,, | Performed by: STUDENT IN AN ORGANIZED HEALTH CARE EDUCATION/TRAINING PROGRAM

## 2019-08-16 PROCEDURE — 93010 ELECTROCARDIOGRAM REPORT: CPT | Mod: ,,, | Performed by: STUDENT IN AN ORGANIZED HEALTH CARE EDUCATION/TRAINING PROGRAM

## 2019-08-16 PROCEDURE — 96374 THER/PROPH/DIAG INJ IV PUSH: CPT

## 2019-08-16 PROCEDURE — 83880 ASSAY OF NATRIURETIC PEPTIDE: CPT

## 2019-08-16 PROCEDURE — 85025 COMPLETE CBC W/AUTO DIFF WBC: CPT

## 2019-08-16 PROCEDURE — 25000242 PHARM REV CODE 250 ALT 637 W/ HCPCS: Performed by: PHYSICIAN ASSISTANT

## 2019-08-16 PROCEDURE — 84484 ASSAY OF TROPONIN QUANT: CPT

## 2019-08-16 PROCEDURE — 82962 GLUCOSE BLOOD TEST: CPT

## 2019-08-16 PROCEDURE — 94640 AIRWAY INHALATION TREATMENT: CPT

## 2019-08-16 RX ORDER — IPRATROPIUM BROMIDE AND ALBUTEROL SULFATE 2.5; .5 MG/3ML; MG/3ML
3 SOLUTION RESPIRATORY (INHALATION)
Status: COMPLETED | OUTPATIENT
Start: 2019-08-16 | End: 2019-08-16

## 2019-08-16 RX ORDER — FUROSEMIDE 10 MG/ML
80 INJECTION INTRAMUSCULAR; INTRAVENOUS
Status: COMPLETED | OUTPATIENT
Start: 2019-08-16 | End: 2019-08-16

## 2019-08-16 RX ADMIN — FUROSEMIDE 80 MG: 10 INJECTION, SOLUTION INTRAVENOUS at 02:08

## 2019-08-16 RX ADMIN — IPRATROPIUM BROMIDE AND ALBUTEROL SULFATE 3 ML: .5; 3 SOLUTION RESPIRATORY (INHALATION) at 02:08
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
## 2019-08-16 NOTE — DISCHARGE INSTRUCTIONS
Please return to the Emergency Department for any concerns or worsening of condition. If you were prescribed antibiotics, please take them to completion. If you were prescribed a narcotic medication, do not drive or operate heavy equipment or machinery, while taking these medications.  Please follow up with your primary care doctor or specialist as needed. If you smoke, please stop smoking.    Our goal in the emergency department is to always give you outstanding care and exceptional service. You may receive a survey by mail or e-mail in the next week regarding your experience in our ED. We would greatly appreciate your completing and returning the survey. Your feedback provides us with a way to recognize our staff who give very good care and it helps us learn how to improve when your experience was below our aspiration of excellence.

## 2019-08-19 DIAGNOSIS — R06.02 SOB (SHORTNESS OF BREATH): Primary | ICD-10-CM

## 2020-01-30 DIAGNOSIS — M54.50 LOW BACK PAIN: Primary | ICD-10-CM

## 2021-02-10 ENCOUNTER — HOSPITAL ENCOUNTER (EMERGENCY)
Facility: HOSPITAL | Age: 56
Discharge: HOME | End: 2021-02-10
Attending: EMERGENCY MEDICINE
Payer: MEDICARE

## 2021-02-10 VITALS
DIASTOLIC BLOOD PRESSURE: 79 MMHG | WEIGHT: 315 LBS | SYSTOLIC BLOOD PRESSURE: 142 MMHG | TEMPERATURE: 98 F | OXYGEN SATURATION: 100 % | RESPIRATION RATE: 16 BRPM | HEART RATE: 74 BPM | BODY MASS INDEX: 53.52 KG/M2

## 2021-02-10 DIAGNOSIS — R07.9 CHEST PAIN: ICD-10-CM

## 2021-02-10 LAB
ALBUMIN SERPL BCP-MCNC: 4 G/DL (ref 3.5–5.2)
ALP SERPL-CCNC: 100 U/L (ref 38–126)
ALT SERPL W/O P-5'-P-CCNC: 19 U/L (ref 10–44)
ANION GAP SERPL CALC-SCNC: 7 MMOL/L (ref 8–16)
AST SERPL-CCNC: 20 U/L (ref 15–46)
BASOPHILS # BLD AUTO: 0.02 K/UL (ref 0–0.2)
BASOPHILS NFR BLD: 0.2 % (ref 0–1.9)
BILIRUB SERPL-MCNC: 0.5 MG/DL (ref 0.1–1)
CALCIUM SERPL-MCNC: 9.3 MG/DL (ref 8.7–10.5)
CHLORIDE SERPL-SCNC: 99 MMOL/L (ref 95–110)
CO2 SERPL-SCNC: 30 MMOL/L (ref 23–29)
CREAT SERPL-MCNC: 0.97 MG/DL (ref 0.5–1.4)
D DIMER PPP IA.FEU-MCNC: 0.3 MG/L FEU
DIFFERENTIAL METHOD: ABNORMAL
EOSINOPHIL # BLD AUTO: 0.2 K/UL (ref 0–0.5)
EOSINOPHIL NFR BLD: 2.1 % (ref 0–8)
ERYTHROCYTE [DISTWIDTH] IN BLOOD BY AUTOMATED COUNT: 13.8 % (ref 11.5–14.5)
EST. GFR  (AFRICAN AMERICAN): >60 ML/MIN/1.73 M^2
EST. GFR  (NON AFRICAN AMERICAN): >60 ML/MIN/1.73 M^2
GLUCOSE SERPL-MCNC: 235 MG/DL (ref 70–110)
HCT VFR BLD AUTO: 37.7 % (ref 40–54)
HGB BLD-MCNC: 13 G/DL (ref 14–18)
IMM GRANULOCYTES # BLD AUTO: 0.04 K/UL (ref 0–0.04)
IMM GRANULOCYTES NFR BLD AUTO: 0.4 % (ref 0–0.5)
LYMPHOCYTES # BLD AUTO: 2.7 K/UL (ref 1–4.8)
LYMPHOCYTES NFR BLD: 29.4 % (ref 18–48)
MCH RBC QN AUTO: 28.4 PG (ref 27–31)
MCHC RBC AUTO-ENTMCNC: 34.5 G/DL (ref 32–36)
MCV RBC AUTO: 82 FL (ref 82–98)
MONOCYTES # BLD AUTO: 0.7 K/UL (ref 0.3–1)
MONOCYTES NFR BLD: 7.5 % (ref 4–15)
NEUTROPHILS # BLD AUTO: 5.6 K/UL (ref 1.8–7.7)
NEUTROPHILS NFR BLD: 60.4 % (ref 38–73)
NRBC BLD-RTO: 0 /100 WBC
NT-PROBNP SERPL-MCNC: 22 PG/ML (ref 5–900)
PLATELET # BLD AUTO: 265 K/UL (ref 150–350)
PMV BLD AUTO: 10.7 FL (ref 9.2–12.9)
POTASSIUM SERPL-SCNC: 4 MMOL/L (ref 3.5–5.1)
PROT SERPL-MCNC: 7.1 G/DL (ref 6–8.4)
RBC # BLD AUTO: 4.58 M/UL (ref 4.6–6.2)
SODIUM SERPL-SCNC: 136 MMOL/L (ref 136–145)
TROPONIN I SERPL-MCNC: <0.012 NG/ML (ref 0.01–0.03)
UUN UR-MCNC: 19 MG/DL (ref 2–20)
WBC # BLD AUTO: 9.25 K/UL (ref 3.9–12.7)

## 2021-02-10 PROCEDURE — 63600175 PHARM REV CODE 636 W HCPCS: Mod: ER | Performed by: PHYSICIAN ASSISTANT

## 2021-02-10 PROCEDURE — 85379 FIBRIN DEGRADATION QUANT: CPT | Mod: ER

## 2021-02-10 PROCEDURE — 93005 ELECTROCARDIOGRAM TRACING: CPT | Mod: ER

## 2021-02-10 PROCEDURE — 25000003 PHARM REV CODE 250: Mod: ER | Performed by: PHYSICIAN ASSISTANT

## 2021-02-10 PROCEDURE — 93010 ELECTROCARDIOGRAM REPORT: CPT | Mod: ,,, | Performed by: INTERNAL MEDICINE

## 2021-02-10 PROCEDURE — 80053 COMPREHEN METABOLIC PANEL: CPT | Mod: ER

## 2021-02-10 PROCEDURE — 84484 ASSAY OF TROPONIN QUANT: CPT | Mod: ER

## 2021-02-10 PROCEDURE — 85025 COMPLETE CBC W/AUTO DIFF WBC: CPT | Mod: ER

## 2021-02-10 PROCEDURE — 96374 THER/PROPH/DIAG INJ IV PUSH: CPT | Mod: ER

## 2021-02-10 PROCEDURE — 83880 ASSAY OF NATRIURETIC PEPTIDE: CPT | Mod: ER

## 2021-02-10 PROCEDURE — 94760 N-INVAS EAR/PLS OXIMETRY 1: CPT | Mod: ER

## 2021-02-10 PROCEDURE — 93010 EKG 12-LEAD: ICD-10-PCS | Mod: ,,, | Performed by: INTERNAL MEDICINE

## 2021-02-10 PROCEDURE — 99285 EMERGENCY DEPT VISIT HI MDM: CPT | Mod: 25,ER

## 2021-02-10 RX ORDER — ASPIRIN 81 MG/1
81 TABLET ORAL DAILY
COMMUNITY

## 2021-02-10 RX ORDER — KETOROLAC TROMETHAMINE 30 MG/ML
15 INJECTION, SOLUTION INTRAMUSCULAR; INTRAVENOUS
Status: COMPLETED | OUTPATIENT
Start: 2021-02-10 | End: 2021-02-10

## 2021-02-10 RX ORDER — SPIRONOLACTONE 50 MG/1
50 TABLET, FILM COATED ORAL DAILY
COMMUNITY

## 2021-02-10 RX ORDER — KETOROLAC TROMETHAMINE 10 MG/1
10 TABLET, FILM COATED ORAL EVERY 6 HOURS
Qty: 10 TABLET | Refills: 0 | Status: SHIPPED | OUTPATIENT
Start: 2021-02-10 | End: 2021-06-15

## 2021-02-10 RX ORDER — METHOCARBAMOL 500 MG/1
500 TABLET, FILM COATED ORAL
Status: COMPLETED | OUTPATIENT
Start: 2021-02-10 | End: 2021-02-10

## 2021-02-10 RX ORDER — METHOCARBAMOL 500 MG/1
500 TABLET, FILM COATED ORAL 3 TIMES DAILY
Qty: 15 TABLET | Refills: 0 | Status: SHIPPED | OUTPATIENT
Start: 2021-02-10 | End: 2021-02-15

## 2021-02-10 RX ORDER — ATORVASTATIN CALCIUM 20 MG/1
20 TABLET, FILM COATED ORAL DAILY
COMMUNITY
End: 2023-01-03 | Stop reason: ALTCHOICE

## 2021-02-10 RX ORDER — PRAVASTATIN SODIUM 40 MG/1
40 TABLET ORAL DAILY
COMMUNITY

## 2021-02-10 RX ORDER — LOSARTAN POTASSIUM AND HYDROCHLOROTHIAZIDE 12.5; 5 MG/1; MG/1
1 TABLET ORAL DAILY
COMMUNITY
End: 2021-06-15 | Stop reason: SDUPTHER

## 2021-02-10 RX ADMIN — METHOCARBAMOL TABLETS 500 MG: 500 TABLET, COATED ORAL at 07:02

## 2021-02-10 RX ADMIN — KETOROLAC TROMETHAMINE 15 MG: 30 INJECTION, SOLUTION INTRAMUSCULAR at 07:02

## 2021-06-02 ENCOUNTER — TELEPHONE (OUTPATIENT)
Dept: FAMILY MEDICINE | Facility: CLINIC | Age: 56
End: 2021-06-02

## 2021-06-15 ENCOUNTER — OFFICE VISIT (OUTPATIENT)
Dept: FAMILY MEDICINE | Facility: CLINIC | Age: 56
End: 2021-06-15
Payer: COMMERCIAL

## 2021-06-15 VITALS
TEMPERATURE: 98 F | BODY MASS INDEX: 46.65 KG/M2 | SYSTOLIC BLOOD PRESSURE: 138 MMHG | HEIGHT: 69 IN | HEART RATE: 102 BPM | DIASTOLIC BLOOD PRESSURE: 88 MMHG | WEIGHT: 315 LBS | OXYGEN SATURATION: 94 %

## 2021-06-15 DIAGNOSIS — D50.9 MICROCYTIC ANEMIA: ICD-10-CM

## 2021-06-15 DIAGNOSIS — I10 ESSENTIAL HYPERTENSION: ICD-10-CM

## 2021-06-15 DIAGNOSIS — E11.65 TYPE 2 DIABETES MELLITUS WITH HYPERGLYCEMIA, WITH LONG-TERM CURRENT USE OF INSULIN: ICD-10-CM

## 2021-06-15 DIAGNOSIS — N52.9 ERECTILE DYSFUNCTION, UNSPECIFIED ERECTILE DYSFUNCTION TYPE: ICD-10-CM

## 2021-06-15 DIAGNOSIS — J44.1 COPD WITH ACUTE EXACERBATION: ICD-10-CM

## 2021-06-15 DIAGNOSIS — M94.9 DISORDER OF CARTILAGE, UNSPECIFIED: ICD-10-CM

## 2021-06-15 DIAGNOSIS — Z12.5 ENCOUNTER FOR SCREENING FOR MALIGNANT NEOPLASM OF PROSTATE: ICD-10-CM

## 2021-06-15 DIAGNOSIS — Z79.4 TYPE 2 DIABETES MELLITUS WITH HYPERGLYCEMIA, WITH LONG-TERM CURRENT USE OF INSULIN: ICD-10-CM

## 2021-06-15 DIAGNOSIS — E78.5 HYPERLIPIDEMIA, UNSPECIFIED HYPERLIPIDEMIA TYPE: Primary | ICD-10-CM

## 2021-06-15 PROCEDURE — 99204 OFFICE O/P NEW MOD 45 MIN: CPT | Mod: S$GLB,,, | Performed by: FAMILY MEDICINE

## 2021-06-15 PROCEDURE — 99204 PR OFFICE/OUTPT VISIT, NEW, LEVL IV, 45-59 MIN: ICD-10-PCS | Mod: S$GLB,,, | Performed by: FAMILY MEDICINE

## 2021-06-15 RX ORDER — PRAVASTATIN SODIUM 40 MG/1
TABLET ORAL
COMMUNITY
Start: 2020-01-13 | End: 2021-06-15 | Stop reason: SDUPTHER

## 2021-06-15 RX ORDER — DULOXETIN HYDROCHLORIDE 30 MG/1
30 CAPSULE, DELAYED RELEASE ORAL NIGHTLY
COMMUNITY
Start: 2020-01-13

## 2021-06-15 RX ORDER — FUROSEMIDE 40 MG/1
40 TABLET ORAL DAILY
COMMUNITY
Start: 2021-05-24 | End: 2021-06-15 | Stop reason: SDUPTHER

## 2021-06-15 RX ORDER — ATORVASTATIN CALCIUM 20 MG/1
TABLET, FILM COATED ORAL
COMMUNITY
Start: 2019-12-16 | End: 2021-06-15 | Stop reason: SDUPTHER

## 2021-06-15 RX ORDER — SPIRONOLACTONE 50 MG/1
TABLET, FILM COATED ORAL
COMMUNITY
Start: 2020-01-13 | End: 2021-06-15 | Stop reason: SDUPTHER

## 2021-06-15 RX ORDER — LOSARTAN POTASSIUM AND HYDROCHLOROTHIAZIDE 12.5; 5 MG/1; MG/1
TABLET ORAL
Status: ON HOLD | COMMUNITY
End: 2023-01-05 | Stop reason: HOSPADM

## 2021-06-16 ENCOUNTER — TELEPHONE (OUTPATIENT)
Dept: FAMILY MEDICINE | Facility: CLINIC | Age: 56
End: 2021-06-16

## 2021-06-16 PROBLEM — N52.9 ERECTILE DYSFUNCTION: Status: ACTIVE | Noted: 2021-06-16

## 2022-02-23 ENCOUNTER — HOSPITAL ENCOUNTER (EMERGENCY)
Facility: HOSPITAL | Age: 57
Discharge: HOME | End: 2022-02-23
Attending: EMERGENCY MEDICINE
Payer: COMMERCIAL

## 2022-02-23 VITALS
OXYGEN SATURATION: 100 % | BODY MASS INDEX: 45.1 KG/M2 | WEIGHT: 315 LBS | DIASTOLIC BLOOD PRESSURE: 84 MMHG | TEMPERATURE: 98 F | RESPIRATION RATE: 16 BRPM | HEART RATE: 88 BPM | HEIGHT: 70 IN | SYSTOLIC BLOOD PRESSURE: 165 MMHG

## 2022-02-23 DIAGNOSIS — M54.9 MID BACK PAIN: ICD-10-CM

## 2022-02-23 DIAGNOSIS — S16.1XXA STRAIN OF NECK MUSCLE, INITIAL ENCOUNTER: ICD-10-CM

## 2022-02-23 DIAGNOSIS — S46.911A STRAIN OF ACROMIOCLAVICULAR JOINT, RIGHT, INITIAL ENCOUNTER: Primary | ICD-10-CM

## 2022-02-23 DIAGNOSIS — M25.511 RIGHT SHOULDER PAIN: ICD-10-CM

## 2022-02-23 PROCEDURE — 99285 EMERGENCY DEPT VISIT HI MDM: CPT | Mod: 25,ER

## 2022-02-23 PROCEDURE — 96372 THER/PROPH/DIAG INJ SC/IM: CPT | Mod: ER

## 2022-02-23 PROCEDURE — 63600175 PHARM REV CODE 636 W HCPCS: Mod: ER | Performed by: PHYSICIAN ASSISTANT

## 2022-02-23 RX ORDER — KETOROLAC TROMETHAMINE 30 MG/ML
30 INJECTION, SOLUTION INTRAMUSCULAR; INTRAVENOUS
Status: COMPLETED | OUTPATIENT
Start: 2022-02-23 | End: 2022-02-23

## 2022-02-23 RX ORDER — KETOROLAC TROMETHAMINE 10 MG/1
10 TABLET, FILM COATED ORAL EVERY 6 HOURS PRN
Qty: 20 TABLET | Refills: 0 | Status: SHIPPED | OUTPATIENT
Start: 2022-02-23 | End: 2022-02-28

## 2022-02-23 RX ORDER — METHOCARBAMOL 500 MG/1
500 TABLET, FILM COATED ORAL 3 TIMES DAILY PRN
Qty: 15 TABLET | Refills: 0 | Status: SHIPPED | OUTPATIENT
Start: 2022-02-23 | End: 2022-02-28

## 2022-02-23 RX ORDER — ORPHENADRINE CITRATE 30 MG/ML
60 INJECTION INTRAMUSCULAR; INTRAVENOUS
Status: COMPLETED | OUTPATIENT
Start: 2022-02-23 | End: 2022-02-23

## 2022-02-23 RX ADMIN — KETOROLAC TROMETHAMINE 30 MG: 30 INJECTION, SOLUTION INTRAMUSCULAR at 06:02

## 2022-02-23 RX ADMIN — ORPHENADRINE CITRATE 60 MG: 60 INJECTION INTRAMUSCULAR; INTRAVENOUS at 06:02

## 2022-02-23 NOTE — Clinical Note
"Tadeo Daveoleon" Osvaldo was seen and treated in our emergency department on 2/23/2022.  He may return to work after being cleared by follow-up physician .       If you have any questions or concerns, please don't hesitate to call.      Marjan Regan PA-C"
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
## 2022-05-09 ENCOUNTER — HOSPITAL ENCOUNTER (OUTPATIENT)
Dept: RADIOLOGY | Facility: HOSPITAL | Age: 57
Discharge: HOME | End: 2022-05-09
Attending: FAMILY MEDICINE
Payer: COMMERCIAL

## 2022-05-09 DIAGNOSIS — R06.09 OTHER FORMS OF DYSPNEA: ICD-10-CM

## 2022-05-09 PROCEDURE — 71046 X-RAY EXAM CHEST 2 VIEWS: CPT | Mod: TC,FY,PO

## 2022-06-06 ENCOUNTER — HOSPITAL ENCOUNTER (EMERGENCY)
Facility: HOSPITAL | Age: 57
Discharge: HOME | End: 2022-06-06
Attending: FAMILY MEDICINE
Payer: COMMERCIAL

## 2022-06-06 VITALS
BODY MASS INDEX: 49.44 KG/M2 | DIASTOLIC BLOOD PRESSURE: 80 MMHG | RESPIRATION RATE: 22 BRPM | OXYGEN SATURATION: 92 % | HEART RATE: 80 BPM | HEIGHT: 67 IN | TEMPERATURE: 98 F | SYSTOLIC BLOOD PRESSURE: 159 MMHG | WEIGHT: 315 LBS

## 2022-06-06 DIAGNOSIS — J45.909 MILD ASTHMA, UNSPECIFIED WHETHER COMPLICATED, UNSPECIFIED WHETHER PERSISTENT: ICD-10-CM

## 2022-06-06 DIAGNOSIS — R06.02 SHORTNESS OF BREATH: Primary | ICD-10-CM

## 2022-06-06 LAB
ALBUMIN SERPL BCP-MCNC: 4.4 G/DL (ref 3.5–5.2)
ALP SERPL-CCNC: 93 U/L (ref 38–126)
ALT SERPL W/O P-5'-P-CCNC: 29 U/L (ref 10–44)
ANION GAP SERPL CALC-SCNC: 9 MMOL/L (ref 8–16)
AST SERPL-CCNC: 27 U/L (ref 15–46)
BASOPHILS # BLD AUTO: 0.03 K/UL (ref 0–0.2)
BASOPHILS NFR BLD: 0.4 % (ref 0–1.9)
BILIRUB SERPL-MCNC: 0.3 MG/DL (ref 0.1–1)
CALCIUM SERPL-MCNC: 9.2 MG/DL (ref 8.7–10.5)
CHLORIDE SERPL-SCNC: 99 MMOL/L (ref 95–110)
CO2 SERPL-SCNC: 29 MMOL/L (ref 23–29)
CREAT SERPL-MCNC: 1.05 MG/DL (ref 0.5–1.4)
DIFFERENTIAL METHOD: ABNORMAL
EOSINOPHIL # BLD AUTO: 0.1 K/UL (ref 0–0.5)
EOSINOPHIL NFR BLD: 1.7 % (ref 0–8)
ERYTHROCYTE [DISTWIDTH] IN BLOOD BY AUTOMATED COUNT: 14.6 % (ref 11.5–14.5)
EST. GFR  (AFRICAN AMERICAN): >60 ML/MIN/1.73 M^2
EST. GFR  (NON AFRICAN AMERICAN): >60 ML/MIN/1.73 M^2
GLUCOSE SERPL-MCNC: 172 MG/DL (ref 70–110)
HCT VFR BLD AUTO: 39.2 % (ref 40–54)
HGB BLD-MCNC: 13.5 G/DL (ref 14–18)
IMM GRANULOCYTES # BLD AUTO: 0.03 K/UL (ref 0–0.04)
IMM GRANULOCYTES NFR BLD AUTO: 0.4 % (ref 0–0.5)
INFLUENZA A, MOLECULAR: NEGATIVE
INFLUENZA B, MOLECULAR: NEGATIVE
LYMPHOCYTES # BLD AUTO: 2.7 K/UL (ref 1–4.8)
LYMPHOCYTES NFR BLD: 34.1 % (ref 18–48)
MCH RBC QN AUTO: 28.5 PG (ref 27–31)
MCHC RBC AUTO-ENTMCNC: 34.4 G/DL (ref 32–36)
MCV RBC AUTO: 83 FL (ref 82–98)
MONOCYTES # BLD AUTO: 0.6 K/UL (ref 0.3–1)
MONOCYTES NFR BLD: 7.7 % (ref 4–15)
NEUTROPHILS # BLD AUTO: 4.3 K/UL (ref 1.8–7.7)
NEUTROPHILS NFR BLD: 55.7 % (ref 38–73)
NRBC BLD-RTO: 0 /100 WBC
NT-PROBNP SERPL-MCNC: 18 PG/ML (ref 5–900)
PLATELET # BLD AUTO: 271 K/UL (ref 150–450)
PMV BLD AUTO: 10.2 FL (ref 9.2–12.9)
POTASSIUM SERPL-SCNC: 4.1 MMOL/L (ref 3.5–5.1)
PROT SERPL-MCNC: 7.4 G/DL (ref 6–8.4)
RBC # BLD AUTO: 4.73 M/UL (ref 4.6–6.2)
SARS-COV-2 RDRP RESP QL NAA+PROBE: NEGATIVE
SODIUM SERPL-SCNC: 137 MMOL/L (ref 136–145)
SPECIMEN SOURCE: NORMAL
TROPONIN I SERPL-MCNC: <0.012 NG/ML (ref 0.01–0.03)
UUN UR-MCNC: 20 MG/DL (ref 2–20)
WBC # BLD AUTO: 7.76 K/UL (ref 3.9–12.7)

## 2022-06-06 PROCEDURE — 83880 ASSAY OF NATRIURETIC PEPTIDE: CPT | Mod: ER | Performed by: PHYSICIAN ASSISTANT

## 2022-06-06 PROCEDURE — 93010 ELECTROCARDIOGRAM REPORT: CPT | Mod: ,,, | Performed by: INTERNAL MEDICINE

## 2022-06-06 PROCEDURE — 87502 INFLUENZA DNA AMP PROBE: CPT | Mod: ER

## 2022-06-06 PROCEDURE — 25000242 PHARM REV CODE 250 ALT 637 W/ HCPCS: Mod: ER | Performed by: PHYSICIAN ASSISTANT

## 2022-06-06 PROCEDURE — 84484 ASSAY OF TROPONIN QUANT: CPT | Mod: ER | Performed by: PHYSICIAN ASSISTANT

## 2022-06-06 PROCEDURE — 93010 EKG 12-LEAD: ICD-10-PCS | Mod: ,,, | Performed by: INTERNAL MEDICINE

## 2022-06-06 PROCEDURE — 99900035 HC TECH TIME PER 15 MIN (STAT): Mod: ER

## 2022-06-06 PROCEDURE — 80053 COMPREHEN METABOLIC PANEL: CPT | Mod: ER | Performed by: PHYSICIAN ASSISTANT

## 2022-06-06 PROCEDURE — 93005 ELECTROCARDIOGRAM TRACING: CPT | Mod: ER

## 2022-06-06 PROCEDURE — 27000221 HC OXYGEN, UP TO 24 HOURS: Mod: ER

## 2022-06-06 PROCEDURE — 87502 INFLUENZA DNA AMP PROBE: CPT | Mod: ER | Performed by: PHYSICIAN ASSISTANT

## 2022-06-06 PROCEDURE — 63600175 PHARM REV CODE 636 W HCPCS: Mod: ER | Performed by: PHYSICIAN ASSISTANT

## 2022-06-06 PROCEDURE — 96374 THER/PROPH/DIAG INJ IV PUSH: CPT | Mod: ER

## 2022-06-06 PROCEDURE — 99285 EMERGENCY DEPT VISIT HI MDM: CPT | Mod: 25,ER

## 2022-06-06 PROCEDURE — 85025 COMPLETE CBC W/AUTO DIFF WBC: CPT | Mod: ER | Performed by: PHYSICIAN ASSISTANT

## 2022-06-06 PROCEDURE — U0002 COVID-19 LAB TEST NON-CDC: HCPCS | Mod: ER | Performed by: PHYSICIAN ASSISTANT

## 2022-06-06 PROCEDURE — 94640 AIRWAY INHALATION TREATMENT: CPT | Mod: ER

## 2022-06-06 RX ORDER — IPRATROPIUM BROMIDE AND ALBUTEROL SULFATE 2.5; .5 MG/3ML; MG/3ML
3 SOLUTION RESPIRATORY (INHALATION)
Status: COMPLETED | OUTPATIENT
Start: 2022-06-06 | End: 2022-06-06

## 2022-06-06 RX ORDER — FUROSEMIDE 10 MG/ML
80 INJECTION INTRAMUSCULAR; INTRAVENOUS
Status: COMPLETED | OUTPATIENT
Start: 2022-06-06 | End: 2022-06-06

## 2022-06-06 RX ADMIN — IPRATROPIUM BROMIDE AND ALBUTEROL SULFATE 3 ML: 2.5; .5 SOLUTION RESPIRATORY (INHALATION) at 04:06

## 2022-06-06 RX ADMIN — FUROSEMIDE 80 MG: 10 INJECTION, SOLUTION INTRAMUSCULAR; INTRAVENOUS at 06:06

## 2022-06-06 RX ADMIN — IPRATROPIUM BROMIDE AND ALBUTEROL SULFATE 3 ML: 2.5; .5 SOLUTION RESPIRATORY (INHALATION) at 05:06

## 2022-06-06 NOTE — ED NOTES
Review of patient's allergies indicates:   Allergen Reactions    Metformin Hives    Lisinopril Other (See Comments)    Benadryl [diphenhydramine hcl] Rash        Patient has verified the spelling of the name and  on armband.   APPEARANCE: Patient is alert, calm, oriented x 4, and mild SOB noted.  SKIN: Skin is normal for race, warm, and dry. Normal skin turgor and mucous membranes moist.  CARDIAC: Normal rate and rhythm, no murmur heard. Peripheral edema noted to BLE  RESPIRATORY:Normal rate and effort. Expiratory wheezing noted. Respirations are equal and unlabored. Mild SOB noted but does not use oxygen at home.   GASTRO: Bowel sounds normal, abdomen is soft, no tenderness, and no abdominal distention. Obese.   MUSCLE: Full ROM. No bony tenderness or soft tissue tenderness. No obvious deformity. Generalized weakness.   PERIPHERAL VASCULAR: peripheral pulses present. Normal cap refill. No edema. Warm to touch.  NEURO: 5/5 strength major flexors/extensors bilaterally. Sensory intact to light touch bilaterally. [place] coma scale: eyes open spontaneously-4, oriented & converses-5, obeys commands-6. No neurological abnormalities.   MENTAL STATUS: awake, alert and aware of environment.  EYE: No overt deficits noted. No drainage. Sclera WNL  ENT: EARS: no obvious drainage. NOSE: no active bleeding. THROAT: no redness or swelling.  : Voids without complication
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
## 2022-06-06 NOTE — DISCHARGE INSTRUCTIONS

## 2023-01-02 ENCOUNTER — HOSPITAL ENCOUNTER (INPATIENT)
Facility: HOSPITAL | Age: 58
LOS: 2 days | Discharge: HOME | DRG: 291 | End: 2023-01-06
Attending: EMERGENCY MEDICINE | Admitting: INTERNAL MEDICINE
Payer: COMMERCIAL

## 2023-01-02 DIAGNOSIS — R07.9 CHEST PAIN: ICD-10-CM

## 2023-01-02 DIAGNOSIS — R06.02 SOB (SHORTNESS OF BREATH): ICD-10-CM

## 2023-01-02 DIAGNOSIS — E87.70 VOLUME OVERLOAD: ICD-10-CM

## 2023-01-02 DIAGNOSIS — R73.9 HYPERGLYCEMIA: ICD-10-CM

## 2023-01-02 DIAGNOSIS — R07.9 ACUTE CHEST PAIN: ICD-10-CM

## 2023-01-02 DIAGNOSIS — J96.01 ACUTE RESPIRATORY FAILURE WITH HYPOXIA: Primary | ICD-10-CM

## 2023-01-02 DIAGNOSIS — R06.2 WHEEZING: ICD-10-CM

## 2023-01-02 DIAGNOSIS — J45.41 MODERATE PERSISTENT ASTHMA WITH EXACERBATION: ICD-10-CM

## 2023-01-02 DIAGNOSIS — E87.70 HYPERVOLEMIA, UNSPECIFIED HYPERVOLEMIA TYPE: ICD-10-CM

## 2023-01-02 PROBLEM — J45.901 ASTHMA EXACERBATION: Status: ACTIVE | Noted: 2023-01-02

## 2023-01-02 PROBLEM — D64.9 NORMOCYTIC ANEMIA: Status: ACTIVE | Noted: 2023-01-02

## 2023-01-02 PROBLEM — J45.909 ASTHMA: Status: ACTIVE | Noted: 2023-01-02

## 2023-01-02 LAB
ALBUMIN SERPL BCP-MCNC: 3.8 G/DL (ref 3.5–5.2)
ALP SERPL-CCNC: 103 U/L (ref 55–135)
ALT SERPL W/O P-5'-P-CCNC: 20 U/L (ref 10–44)
ANION GAP SERPL CALC-SCNC: 14 MMOL/L (ref 8–16)
AST SERPL-CCNC: 14 U/L (ref 10–40)
B-OH-BUTYR BLD STRIP-SCNC: 0.2 MMOL/L (ref 0–0.5)
BASOPHILS # BLD AUTO: 0.03 K/UL (ref 0–0.2)
BASOPHILS NFR BLD: 0.4 % (ref 0–1.9)
BILIRUB SERPL-MCNC: 0.4 MG/DL (ref 0.1–1)
BNP SERPL-MCNC: <10 PG/ML (ref 0–99)
BUN SERPL-MCNC: 13 MG/DL (ref 6–20)
CALCIUM SERPL-MCNC: 10.6 MG/DL (ref 8.7–10.5)
CHLORIDE SERPL-SCNC: 92 MMOL/L (ref 95–110)
CHLORIDE UR-SCNC: 58 MMOL/L (ref 25–200)
CO2 SERPL-SCNC: 28 MMOL/L (ref 23–29)
CREAT SERPL-MCNC: 1.3 MG/DL (ref 0.5–1.4)
CREAT UR-MCNC: 30.5 MG/DL (ref 23–375)
DIFFERENTIAL METHOD: ABNORMAL
EOSINOPHIL # BLD AUTO: 0.2 K/UL (ref 0–0.5)
EOSINOPHIL NFR BLD: 2.1 % (ref 0–8)
ERYTHROCYTE [DISTWIDTH] IN BLOOD BY AUTOMATED COUNT: 13.9 % (ref 11.5–14.5)
EST. GFR  (NO RACE VARIABLE): >60 ML/MIN/1.73 M^2
GLUCOSE SERPL-MCNC: 566 MG/DL (ref 70–110)
HCT VFR BLD AUTO: 38.4 % (ref 40–54)
HGB BLD-MCNC: 13.4 G/DL (ref 14–18)
IMM GRANULOCYTES # BLD AUTO: 0.03 K/UL (ref 0–0.04)
IMM GRANULOCYTES NFR BLD AUTO: 0.4 % (ref 0–0.5)
INFLUENZA A, MOLECULAR: NEGATIVE
INFLUENZA B, MOLECULAR: NEGATIVE
LYMPHOCYTES # BLD AUTO: 2.4 K/UL (ref 1–4.8)
LYMPHOCYTES NFR BLD: 28.4 % (ref 18–48)
MCH RBC QN AUTO: 28.8 PG (ref 27–31)
MCHC RBC AUTO-ENTMCNC: 34.9 G/DL (ref 32–36)
MCV RBC AUTO: 82 FL (ref 82–98)
MONOCYTES # BLD AUTO: 0.5 K/UL (ref 0.3–1)
MONOCYTES NFR BLD: 5.9 % (ref 4–15)
NEUTROPHILS # BLD AUTO: 5.3 K/UL (ref 1.8–7.7)
NEUTROPHILS NFR BLD: 62.8 % (ref 38–73)
NRBC BLD-RTO: 0 /100 WBC
OSMOLALITY UR: 515 MOSM/KG (ref 50–1200)
PLATELET # BLD AUTO: 279 K/UL (ref 150–450)
PMV BLD AUTO: 10.3 FL (ref 9.2–12.9)
POCT GLUCOSE: 243 MG/DL (ref 70–110)
POCT GLUCOSE: 311 MG/DL (ref 70–110)
POCT GLUCOSE: 424 MG/DL (ref 70–110)
POCT GLUCOSE: >500 MG/DL (ref 70–110)
POTASSIUM SERPL-SCNC: 4.4 MMOL/L (ref 3.5–5.1)
PROCALCITONIN SERPL IA-MCNC: 0.06 NG/ML
PROT SERPL-MCNC: 7.7 G/DL (ref 6–8.4)
RBC # BLD AUTO: 4.66 M/UL (ref 4.6–6.2)
SARS-COV-2 RDRP RESP QL NAA+PROBE: NEGATIVE
SODIUM SERPL-SCNC: 134 MMOL/L (ref 136–145)
SODIUM UR-SCNC: 59 MMOL/L (ref 20–250)
SPECIMEN SOURCE: NORMAL
TROPONIN I SERPL DL<=0.01 NG/ML-MCNC: 0.02 NG/ML (ref 0–0.03)
TSH SERPL DL<=0.005 MIU/L-ACNC: 1.12 UIU/ML (ref 0.4–4)
UUN UR-MCNC: 166 MG/DL (ref 140–1050)
WBC # BLD AUTO: 8.49 K/UL (ref 3.9–12.7)

## 2023-01-02 PROCEDURE — 87502 INFLUENZA DNA AMP PROBE: CPT | Performed by: NURSE PRACTITIONER

## 2023-01-02 PROCEDURE — 96372 THER/PROPH/DIAG INJ SC/IM: CPT | Performed by: STUDENT IN AN ORGANIZED HEALTH CARE EDUCATION/TRAINING PROGRAM

## 2023-01-02 PROCEDURE — 82436 ASSAY OF URINE CHLORIDE: CPT | Performed by: STUDENT IN AN ORGANIZED HEALTH CARE EDUCATION/TRAINING PROGRAM

## 2023-01-02 PROCEDURE — 82962 GLUCOSE BLOOD TEST: CPT

## 2023-01-02 PROCEDURE — 93010 ELECTROCARDIOGRAM REPORT: CPT | Mod: ,,, | Performed by: INTERNAL MEDICINE

## 2023-01-02 PROCEDURE — 82010 KETONE BODYS QUAN: CPT | Performed by: STUDENT IN AN ORGANIZED HEALTH CARE EDUCATION/TRAINING PROGRAM

## 2023-01-02 PROCEDURE — 84145 PROCALCITONIN (PCT): CPT | Performed by: STUDENT IN AN ORGANIZED HEALTH CARE EDUCATION/TRAINING PROGRAM

## 2023-01-02 PROCEDURE — 80053 COMPREHEN METABOLIC PANEL: CPT | Performed by: NURSE PRACTITIONER

## 2023-01-02 PROCEDURE — 99285 EMERGENCY DEPT VISIT HI MDM: CPT | Mod: 25

## 2023-01-02 PROCEDURE — 84540 ASSAY OF URINE/UREA-N: CPT | Performed by: STUDENT IN AN ORGANIZED HEALTH CARE EDUCATION/TRAINING PROGRAM

## 2023-01-02 PROCEDURE — 93005 ELECTROCARDIOGRAM TRACING: CPT

## 2023-01-02 PROCEDURE — 93010 EKG 12-LEAD: ICD-10-PCS | Mod: ,,, | Performed by: INTERNAL MEDICINE

## 2023-01-02 PROCEDURE — 96374 THER/PROPH/DIAG INJ IV PUSH: CPT

## 2023-01-02 PROCEDURE — 87502 INFLUENZA DNA AMP PROBE: CPT

## 2023-01-02 PROCEDURE — 82570 ASSAY OF URINE CREATININE: CPT | Performed by: STUDENT IN AN ORGANIZED HEALTH CARE EDUCATION/TRAINING PROGRAM

## 2023-01-02 PROCEDURE — 83935 ASSAY OF URINE OSMOLALITY: CPT | Performed by: STUDENT IN AN ORGANIZED HEALTH CARE EDUCATION/TRAINING PROGRAM

## 2023-01-02 PROCEDURE — 25000242 PHARM REV CODE 250 ALT 637 W/ HCPCS: Performed by: STUDENT IN AN ORGANIZED HEALTH CARE EDUCATION/TRAINING PROGRAM

## 2023-01-02 PROCEDURE — 84484 ASSAY OF TROPONIN QUANT: CPT | Performed by: STUDENT IN AN ORGANIZED HEALTH CARE EDUCATION/TRAINING PROGRAM

## 2023-01-02 PROCEDURE — 83880 ASSAY OF NATRIURETIC PEPTIDE: CPT | Performed by: NURSE PRACTITIONER

## 2023-01-02 PROCEDURE — 96375 TX/PRO/DX INJ NEW DRUG ADDON: CPT

## 2023-01-02 PROCEDURE — U0002 COVID-19 LAB TEST NON-CDC: HCPCS | Performed by: NURSE PRACTITIONER

## 2023-01-02 PROCEDURE — G0378 HOSPITAL OBSERVATION PER HR: HCPCS

## 2023-01-02 PROCEDURE — 94761 N-INVAS EAR/PLS OXIMETRY MLT: CPT

## 2023-01-02 PROCEDURE — 63600175 PHARM REV CODE 636 W HCPCS: Performed by: EMERGENCY MEDICINE

## 2023-01-02 PROCEDURE — 25000003 PHARM REV CODE 250: Performed by: STUDENT IN AN ORGANIZED HEALTH CARE EDUCATION/TRAINING PROGRAM

## 2023-01-02 PROCEDURE — 63600175 PHARM REV CODE 636 W HCPCS: Performed by: STUDENT IN AN ORGANIZED HEALTH CARE EDUCATION/TRAINING PROGRAM

## 2023-01-02 PROCEDURE — 85025 COMPLETE CBC W/AUTO DIFF WBC: CPT | Performed by: EMERGENCY MEDICINE

## 2023-01-02 PROCEDURE — 84443 ASSAY THYROID STIM HORMONE: CPT | Performed by: STUDENT IN AN ORGANIZED HEALTH CARE EDUCATION/TRAINING PROGRAM

## 2023-01-02 PROCEDURE — 84300 ASSAY OF URINE SODIUM: CPT | Performed by: STUDENT IN AN ORGANIZED HEALTH CARE EDUCATION/TRAINING PROGRAM

## 2023-01-02 PROCEDURE — 94640 AIRWAY INHALATION TREATMENT: CPT

## 2023-01-02 RX ORDER — INSULIN ASPART 100 [IU]/ML
1-10 INJECTION, SOLUTION INTRAVENOUS; SUBCUTANEOUS
Status: DISCONTINUED | OUTPATIENT
Start: 2023-01-02 | End: 2023-01-06 | Stop reason: HOSPADM

## 2023-01-02 RX ORDER — LIDOCAINE 50 MG/G
2 PATCH TOPICAL
Status: DISCONTINUED | OUTPATIENT
Start: 2023-01-02 | End: 2023-01-06 | Stop reason: HOSPADM

## 2023-01-02 RX ORDER — PRAVASTATIN SODIUM 40 MG/1
40 TABLET ORAL DAILY
Status: DISCONTINUED | OUTPATIENT
Start: 2023-01-03 | End: 2023-01-02

## 2023-01-02 RX ORDER — IBUPROFEN 200 MG
24 TABLET ORAL
Status: DISCONTINUED | OUTPATIENT
Start: 2023-01-02 | End: 2023-01-06 | Stop reason: HOSPADM

## 2023-01-02 RX ORDER — FUROSEMIDE 10 MG/ML
80 INJECTION INTRAMUSCULAR; INTRAVENOUS
Status: COMPLETED | OUTPATIENT
Start: 2023-01-02 | End: 2023-01-02

## 2023-01-02 RX ORDER — SODIUM CHLORIDE 0.9 % (FLUSH) 0.9 %
10 SYRINGE (ML) INJECTION EVERY 12 HOURS PRN
Status: DISCONTINUED | OUTPATIENT
Start: 2023-01-02 | End: 2023-01-06 | Stop reason: HOSPADM

## 2023-01-02 RX ORDER — GLUCAGON 1 MG
1 KIT INJECTION
Status: DISCONTINUED | OUTPATIENT
Start: 2023-01-02 | End: 2023-01-06 | Stop reason: HOSPADM

## 2023-01-02 RX ORDER — IBUPROFEN 200 MG
16 TABLET ORAL
Status: DISCONTINUED | OUTPATIENT
Start: 2023-01-02 | End: 2023-01-06 | Stop reason: HOSPADM

## 2023-01-02 RX ORDER — IPRATROPIUM BROMIDE AND ALBUTEROL SULFATE 2.5; .5 MG/3ML; MG/3ML
3 SOLUTION RESPIRATORY (INHALATION)
Status: ACTIVE | OUTPATIENT
Start: 2023-01-02 | End: 2023-01-02

## 2023-01-02 RX ORDER — ENOXAPARIN SODIUM 100 MG/ML
40 INJECTION SUBCUTANEOUS EVERY 24 HOURS
Status: DISCONTINUED | OUTPATIENT
Start: 2023-01-02 | End: 2023-01-06 | Stop reason: HOSPADM

## 2023-01-02 RX ORDER — ASPIRIN 81 MG/1
81 TABLET ORAL DAILY
Status: DISCONTINUED | OUTPATIENT
Start: 2023-01-03 | End: 2023-01-06 | Stop reason: HOSPADM

## 2023-01-02 RX ORDER — BENZONATATE 100 MG/1
100 CAPSULE ORAL 3 TIMES DAILY PRN
Status: DISCONTINUED | OUTPATIENT
Start: 2023-01-02 | End: 2023-01-06 | Stop reason: HOSPADM

## 2023-01-02 RX ORDER — IPRATROPIUM BROMIDE AND ALBUTEROL SULFATE 2.5; .5 MG/3ML; MG/3ML
3 SOLUTION RESPIRATORY (INHALATION)
Status: DISCONTINUED | OUTPATIENT
Start: 2023-01-02 | End: 2023-01-06 | Stop reason: HOSPADM

## 2023-01-02 RX ORDER — FUROSEMIDE 10 MG/ML
80 INJECTION INTRAMUSCULAR; INTRAVENOUS DAILY
Status: DISCONTINUED | OUTPATIENT
Start: 2023-01-03 | End: 2023-01-06 | Stop reason: HOSPADM

## 2023-01-02 RX ORDER — DULOXETIN HYDROCHLORIDE 30 MG/1
30 CAPSULE, DELAYED RELEASE ORAL NIGHTLY
Status: DISCONTINUED | OUTPATIENT
Start: 2023-01-02 | End: 2023-01-06 | Stop reason: HOSPADM

## 2023-01-02 RX ORDER — FUROSEMIDE 40 MG/1
40 TABLET ORAL DAILY
COMMUNITY

## 2023-01-02 RX ORDER — PREDNISONE 20 MG/1
40 TABLET ORAL DAILY
Status: DISCONTINUED | OUTPATIENT
Start: 2023-01-03 | End: 2023-01-04

## 2023-01-02 RX ORDER — ATORVASTATIN CALCIUM 20 MG/1
20 TABLET, FILM COATED ORAL DAILY
Status: DISCONTINUED | OUTPATIENT
Start: 2023-01-03 | End: 2023-01-06 | Stop reason: HOSPADM

## 2023-01-02 RX ORDER — NALOXONE HCL 0.4 MG/ML
0.02 VIAL (ML) INJECTION
Status: DISCONTINUED | OUTPATIENT
Start: 2023-01-02 | End: 2023-01-06 | Stop reason: HOSPADM

## 2023-01-02 RX ADMIN — INSULIN ASPART 4 UNITS: 100 INJECTION, SOLUTION INTRAVENOUS; SUBCUTANEOUS at 09:01

## 2023-01-02 RX ADMIN — ENOXAPARIN SODIUM 40 MG: 40 INJECTION SUBCUTANEOUS at 09:01

## 2023-01-02 RX ADMIN — METHYLPREDNISOLONE SODIUM SUCCINATE 80 MG: 40 INJECTION, POWDER, FOR SOLUTION INTRAMUSCULAR; INTRAVENOUS at 05:01

## 2023-01-02 RX ADMIN — FUROSEMIDE 80 MG: 10 INJECTION, SOLUTION INTRAMUSCULAR; INTRAVENOUS at 05:01

## 2023-01-02 RX ADMIN — LIDOCAINE 2 PATCH: 50 PATCH CUTANEOUS at 09:01

## 2023-01-02 RX ADMIN — INSULIN HUMAN 5 UNITS: 100 INJECTION, SOLUTION PARENTERAL at 05:01

## 2023-01-02 RX ADMIN — DULOXETINE 30 MG: 30 CAPSULE, DELAYED RELEASE ORAL at 09:01

## 2023-01-02 RX ADMIN — INSULIN DETEMIR 30 UNITS: 100 INJECTION, SOLUTION SUBCUTANEOUS at 09:01

## 2023-01-02 RX ADMIN — IPRATROPIUM BROMIDE AND ALBUTEROL SULFATE 3 ML: .5; 3 SOLUTION RESPIRATORY (INHALATION) at 08:01

## 2023-01-02 NOTE — Clinical Note
Diagnosis: Acute respiratory failure with hypoxia [322496]   Future Attending Provider: MULUGETA BARRERA [[id number]]   Admitting Provider:: MULUGETA BARRERA [[id number]]

## 2023-01-02 NOTE — FIRST PROVIDER EVALUATION
Medical screening examination initiated.  I have conducted a focused provider triage encounter, findings are as follows:    Brief history of present illness:  wheezing, worsening SOB- seen yesterday at [facility] for the same problem but refused admission    There were no vitals filed for this visit.    Pertinent physical exam:  audible wheezing    Brief workup plan:  labs, CXR, lasix    Preliminary workup initiated; this workup will be continued and followed by the physician or advanced practice provider that is assigned to the patient when roomed.
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
## 2023-01-03 LAB
ALBUMIN SERPL BCP-MCNC: 3.6 G/DL (ref 3.5–5.2)
ALP SERPL-CCNC: 98 U/L (ref 55–135)
ALT SERPL W/O P-5'-P-CCNC: 21 U/L (ref 10–44)
ANION GAP SERPL CALC-SCNC: 12 MMOL/L (ref 8–16)
AST SERPL-CCNC: 13 U/L (ref 10–40)
BASOPHILS # BLD AUTO: 0.02 K/UL (ref 0–0.2)
BASOPHILS NFR BLD: 0.2 % (ref 0–1.9)
BILIRUB SERPL-MCNC: 0.5 MG/DL (ref 0.1–1)
BUN SERPL-MCNC: 21 MG/DL (ref 6–20)
CALCIUM SERPL-MCNC: 10.1 MG/DL (ref 8.7–10.5)
CHLORIDE SERPL-SCNC: 94 MMOL/L (ref 95–110)
CHOLEST SERPL-MCNC: 223 MG/DL (ref 120–199)
CHOLEST/HDLC SERPL: 2.8 {RATIO} (ref 2–5)
CO2 SERPL-SCNC: 28 MMOL/L (ref 23–29)
CREAT SERPL-MCNC: 1.2 MG/DL (ref 0.5–1.4)
DIFFERENTIAL METHOD: ABNORMAL
EOSINOPHIL # BLD AUTO: 0 K/UL (ref 0–0.5)
EOSINOPHIL NFR BLD: 0 % (ref 0–8)
ERYTHROCYTE [DISTWIDTH] IN BLOOD BY AUTOMATED COUNT: 13.5 % (ref 11.5–14.5)
EST. GFR  (NO RACE VARIABLE): >60 ML/MIN/1.73 M^2
ESTIMATED AVG GLUCOSE: 286 MG/DL (ref 68–131)
FERRITIN SERPL-MCNC: 554 NG/ML (ref 20–300)
FOLATE SERPL-MCNC: 12.9 NG/ML (ref 4–24)
GLUCOSE SERPL-MCNC: 407 MG/DL (ref 70–110)
HBA1C MFR BLD: 11.6 % (ref 4–5.6)
HCT VFR BLD AUTO: 40 % (ref 40–54)
HDLC SERPL-MCNC: 79 MG/DL (ref 40–75)
HDLC SERPL: 35.4 % (ref 20–50)
HGB BLD-MCNC: 14 G/DL (ref 14–18)
IMM GRANULOCYTES # BLD AUTO: 0.04 K/UL (ref 0–0.04)
IMM GRANULOCYTES NFR BLD AUTO: 0.3 % (ref 0–0.5)
IRON SERPL-MCNC: 39 UG/DL (ref 45–160)
LDLC SERPL CALC-MCNC: 126 MG/DL (ref 63–159)
LYMPHOCYTES # BLD AUTO: 1.4 K/UL (ref 1–4.8)
LYMPHOCYTES NFR BLD: 11.4 % (ref 18–48)
MAGNESIUM SERPL-MCNC: 1.7 MG/DL (ref 1.6–2.6)
MCH RBC QN AUTO: 28.3 PG (ref 27–31)
MCHC RBC AUTO-ENTMCNC: 35 G/DL (ref 32–36)
MCV RBC AUTO: 81 FL (ref 82–98)
MONOCYTES # BLD AUTO: 0.3 K/UL (ref 0.3–1)
MONOCYTES NFR BLD: 2.3 % (ref 4–15)
NEUTROPHILS # BLD AUTO: 10.3 K/UL (ref 1.8–7.7)
NEUTROPHILS NFR BLD: 85.8 % (ref 38–73)
NONHDLC SERPL-MCNC: 144 MG/DL
NRBC BLD-RTO: 0 /100 WBC
PHOSPHATE SERPL-MCNC: 4.7 MG/DL (ref 2.7–4.5)
PLATELET # BLD AUTO: 315 K/UL (ref 150–450)
PMV BLD AUTO: 10.5 FL (ref 9.2–12.9)
POCT GLUCOSE: 442 MG/DL (ref 70–110)
POCT GLUCOSE: 461 MG/DL (ref 70–110)
POCT GLUCOSE: >500 MG/DL (ref 70–110)
POCT GLUCOSE: >500 MG/DL (ref 70–110)
POTASSIUM SERPL-SCNC: 4.8 MMOL/L (ref 3.5–5.1)
PROT SERPL-MCNC: 7.4 G/DL (ref 6–8.4)
RBC # BLD AUTO: 4.95 M/UL (ref 4.6–6.2)
SATURATED IRON: 14 % (ref 20–50)
SODIUM SERPL-SCNC: 134 MMOL/L (ref 136–145)
TOTAL IRON BINDING CAPACITY: 286 UG/DL (ref 250–450)
TRANSFERRIN SERPL-MCNC: 193 MG/DL (ref 200–375)
TRIGL SERPL-MCNC: 90 MG/DL (ref 30–150)
VIT B12 SERPL-MCNC: 477 PG/ML (ref 210–950)
WBC # BLD AUTO: 11.96 K/UL (ref 3.9–12.7)

## 2023-01-03 PROCEDURE — 80061 LIPID PANEL: CPT | Performed by: STUDENT IN AN ORGANIZED HEALTH CARE EDUCATION/TRAINING PROGRAM

## 2023-01-03 PROCEDURE — 25000003 PHARM REV CODE 250: Performed by: STUDENT IN AN ORGANIZED HEALTH CARE EDUCATION/TRAINING PROGRAM

## 2023-01-03 PROCEDURE — 96376 TX/PRO/DX INJ SAME DRUG ADON: CPT

## 2023-01-03 PROCEDURE — G0378 HOSPITAL OBSERVATION PER HR: HCPCS

## 2023-01-03 PROCEDURE — 83036 HEMOGLOBIN GLYCOSYLATED A1C: CPT | Performed by: STUDENT IN AN ORGANIZED HEALTH CARE EDUCATION/TRAINING PROGRAM

## 2023-01-03 PROCEDURE — 63600175 PHARM REV CODE 636 W HCPCS: Performed by: STUDENT IN AN ORGANIZED HEALTH CARE EDUCATION/TRAINING PROGRAM

## 2023-01-03 PROCEDURE — 80053 COMPREHEN METABOLIC PANEL: CPT | Performed by: STUDENT IN AN ORGANIZED HEALTH CARE EDUCATION/TRAINING PROGRAM

## 2023-01-03 PROCEDURE — 84466 ASSAY OF TRANSFERRIN: CPT | Performed by: STUDENT IN AN ORGANIZED HEALTH CARE EDUCATION/TRAINING PROGRAM

## 2023-01-03 PROCEDURE — 94640 AIRWAY INHALATION TREATMENT: CPT | Mod: XB

## 2023-01-03 PROCEDURE — 82746 ASSAY OF FOLIC ACID SERUM: CPT | Performed by: STUDENT IN AN ORGANIZED HEALTH CARE EDUCATION/TRAINING PROGRAM

## 2023-01-03 PROCEDURE — 82728 ASSAY OF FERRITIN: CPT | Performed by: STUDENT IN AN ORGANIZED HEALTH CARE EDUCATION/TRAINING PROGRAM

## 2023-01-03 PROCEDURE — 82962 GLUCOSE BLOOD TEST: CPT

## 2023-01-03 PROCEDURE — 25000242 PHARM REV CODE 250 ALT 637 W/ HCPCS: Performed by: STUDENT IN AN ORGANIZED HEALTH CARE EDUCATION/TRAINING PROGRAM

## 2023-01-03 PROCEDURE — 94761 N-INVAS EAR/PLS OXIMETRY MLT: CPT

## 2023-01-03 PROCEDURE — 85025 COMPLETE CBC W/AUTO DIFF WBC: CPT | Performed by: STUDENT IN AN ORGANIZED HEALTH CARE EDUCATION/TRAINING PROGRAM

## 2023-01-03 PROCEDURE — 84100 ASSAY OF PHOSPHORUS: CPT | Performed by: STUDENT IN AN ORGANIZED HEALTH CARE EDUCATION/TRAINING PROGRAM

## 2023-01-03 PROCEDURE — 82607 VITAMIN B-12: CPT | Performed by: STUDENT IN AN ORGANIZED HEALTH CARE EDUCATION/TRAINING PROGRAM

## 2023-01-03 PROCEDURE — 83735 ASSAY OF MAGNESIUM: CPT | Performed by: STUDENT IN AN ORGANIZED HEALTH CARE EDUCATION/TRAINING PROGRAM

## 2023-01-03 PROCEDURE — 25500020 PHARM REV CODE 255: Performed by: INTERNAL MEDICINE

## 2023-01-03 PROCEDURE — 96372 THER/PROPH/DIAG INJ SC/IM: CPT | Performed by: STUDENT IN AN ORGANIZED HEALTH CARE EDUCATION/TRAINING PROGRAM

## 2023-01-03 RX ORDER — CYCLOBENZAPRINE HCL 10 MG
10 TABLET ORAL
Status: ON HOLD | COMMUNITY
Start: 2022-10-10 | End: 2023-01-03

## 2023-01-03 RX ORDER — CHOLECALCIFEROL (VITAMIN D3) 25 MCG
TABLET ORAL
COMMUNITY
Start: 2022-10-25

## 2023-01-03 RX ORDER — LOSARTAN POTASSIUM 50 MG/1
50 TABLET ORAL DAILY
Status: ON HOLD | COMMUNITY
Start: 2022-11-01 | End: 2023-01-05 | Stop reason: HOSPADM

## 2023-01-03 RX ADMIN — LIDOCAINE 2 PATCH: 50 PATCH CUTANEOUS at 10:01

## 2023-01-03 RX ADMIN — IPRATROPIUM BROMIDE AND ALBUTEROL SULFATE 3 ML: .5; 3 SOLUTION RESPIRATORY (INHALATION) at 12:01

## 2023-01-03 RX ADMIN — ATORVASTATIN CALCIUM 20 MG: 20 TABLET, FILM COATED ORAL at 08:01

## 2023-01-03 RX ADMIN — INSULIN DETEMIR 20 UNITS: 100 INJECTION, SOLUTION SUBCUTANEOUS at 10:01

## 2023-01-03 RX ADMIN — HUMAN ALBUMIN MICROSPHERES AND PERFLUTREN 0.11 MG: 10; .22 INJECTION, SOLUTION INTRAVENOUS at 11:01

## 2023-01-03 RX ADMIN — IPRATROPIUM BROMIDE AND ALBUTEROL SULFATE 3 ML: .5; 3 SOLUTION RESPIRATORY (INHALATION) at 10:01

## 2023-01-03 RX ADMIN — FUROSEMIDE 80 MG: 10 INJECTION, SOLUTION INTRAMUSCULAR; INTRAVENOUS at 08:01

## 2023-01-03 RX ADMIN — INSULIN ASPART 10 UNITS: 100 INJECTION, SOLUTION INTRAVENOUS; SUBCUTANEOUS at 12:01

## 2023-01-03 RX ADMIN — PREDNISONE 40 MG: 20 TABLET ORAL at 08:01

## 2023-01-03 RX ADMIN — ENOXAPARIN SODIUM 40 MG: 40 INJECTION SUBCUTANEOUS at 05:01

## 2023-01-03 RX ADMIN — INSULIN ASPART 10 UNITS: 100 INJECTION, SOLUTION INTRAVENOUS; SUBCUTANEOUS at 05:01

## 2023-01-03 RX ADMIN — ASPIRIN 81 MG: 81 TABLET, COATED ORAL at 08:01

## 2023-01-03 RX ADMIN — INSULIN DETEMIR 20 UNITS: 100 INJECTION, SOLUTION SUBCUTANEOUS at 08:01

## 2023-01-03 RX ADMIN — IPRATROPIUM BROMIDE AND ALBUTEROL SULFATE 3 ML: .5; 3 SOLUTION RESPIRATORY (INHALATION) at 08:01

## 2023-01-03 RX ADMIN — INSULIN ASPART 10 UNITS: 100 INJECTION, SOLUTION INTRAVENOUS; SUBCUTANEOUS at 08:01

## 2023-01-03 RX ADMIN — DULOXETINE 30 MG: 30 CAPSULE, DELAYED RELEASE ORAL at 10:01

## 2023-01-03 NOTE — PROGRESS NOTES
"[facility] Medicine Progress Note    Primary Team: [facility] Hospitalist Team B  Attending Physician: Serafin Prather MD  Resident: Rehan  Intern: [facility] Day: 0     Chief Complaint: SOB x 2-days   Subjective:      Interval: No acute events overnight with patient UOP ~2L    This AM: Patient seen and examined by me this morning. Still complaints of abdominal fullness/tightness that he states is about three-times the size of his normal abdomen currently. Also, continues with mild cough. No other complaints at this time     Review of Systems   Constitutional:  Negative for chills and fever.   HENT:  Negative for congestion and sore throat.    Respiratory:  Positive for cough, shortness of breath and wheezing.    Cardiovascular:  Negative for chest pain, palpitations and leg swelling.   Gastrointestinal:  Negative for abdominal pain and vomiting.        Abdominal swelling   Genitourinary:  Negative for dysuria and frequency.   Musculoskeletal:  Negative for back pain and joint pain.   Skin:  Negative for itching and rash.   Neurological:  Negative for dizziness and headaches.   Psychiatric/Behavioral:  The patient is not nervous/anxious and does not have insomnia.    All other systems reviewed and are negative.   Past Medical History:   Diagnosis Date    CHF (congestive heart failure)     Diabetes mellitus     Hypertension     Obesity               Objective:   Last 24 Hour Vital Signs:  BP  Min: 124/64  Max: 166/87  Temp  Av.3 °F (36.8 °C)  Min: 97.6 °F (36.4 °C)  Max: 98.6 °F (37 °C)  Pulse  Av.3  Min: 83  Max: 94  Resp  Av.2  Min: 15  Max: 25  SpO2  Av %  Min: 91 %  Max: 100 %  Height  Av' 7" (170.2 cm)  Min: 5' 7" (170.2 cm)  Max: 5' 7" (170.2 cm)  Weight  Av.3 kg (309 lb 5.2 oz)  Min: 140 kg (308 lb 10.3 oz)  Max: 140.6 kg (310 lb)    Intake/Output Summary (Last 24 hours) at 1/3/2023 0858  Last data filed at 1/3/2023 0736  Gross per 24 hour   Intake 300 ml   Output 2350 ml "   Net -2050 ml      Physical Examination:  Physical Exam  Constitutional:       Appearance: He is obese.   HENT:      Head: Normocephalic.      Right Ear: External ear normal.      Left Ear: External ear normal.      Nose: Nose normal.      Mouth/Throat:      Mouth: Mucous membranes are moist.      Pharynx: Oropharynx is clear.   Eyes:      Extraocular Movements: Extraocular movements intact.      Pupils: Pupils are equal, round, and reactive to light.   Cardiovascular:      Rate and Rhythm: Normal rate and regular rhythm.      Pulses: Normal pulses.      Heart sounds: Normal heart sounds.   Pulmonary:      Effort: Pulmonary effort is normal.      Breath sounds: Wheezing and rales present.      Comments: Rales present over bilateral lung fields with end expiratory wheezing  Abdominal:      General: Bowel sounds are normal. There is distension.      Tenderness: There is no abdominal tenderness. There is no guarding.   Musculoskeletal:      Right lower leg: No edema.      Left lower leg: No edema.   Skin:     General: Skin is warm and dry.      Capillary Refill: Capillary refill takes less than 2 seconds.      Findings: No erythema or rash.   Neurological:      General: No focal deficit present.      Mental Status: He is alert. Mental status is at baseline.   Psychiatric:         Mood and Affect: Mood normal.         Behavior: Behavior normal.      Laboratory:  Recent Labs   Lab 01/01/23  0157 01/02/23  1554 01/02/23  1654 01/03/23  0554   WBC 7.09  --  8.49 11.96   HGB 13.3*  --  13.4* 14.0   HCT 38.8*  --  38.4* 40.0     --  279 315   MCV 83  --  82 81*   RDW 14.0  --  13.9 13.5    134*  --  134*   K 4.2 4.4  --  4.8    92*  --  94*   CO2 29 28  --  28   BUN 14 13  --  21*   CREATININE 0.71 1.3  --  1.2   * 566*  --  407*   PROT 7.3 7.7  --  7.4   ALBUMIN 4.2 3.8  --  3.6   BILITOT 0.5 0.4  --  0.5   AST 28 14  --  13   ALKPHOS 100 103  --  98   ALT 27 20  --  21       Urinalysis  No  results for input(s): COLORU, CLARITYU, SPECGRAV, PHUR, PROTEINUA, GLUCOSEU, BILIRUBINCON, BLOODU, WBCU, RBCU, BACTERIA, MUCUS, NITRITE, LEUKOCYTESUR, UROBILINOGEN, HYALINECASTS in the last 24 hours.    Microbiology Results (last 7 days)       Procedure Component Value Units Date/Time    Influenza A & B by Molecular [[id number]] Collected: 01/02/23 1535    Order Status: Completed Specimen: Nasopharyngeal Swab Updated: 01/02/23 1557     Influenza A, Molecular Negative     Influenza B, Molecular Negative     Flu A & B Source Nasal swab             I have personally reviewed the above laboratory studies.     Imaging:  EKG (my interpretation): No new today    X-Ray Chest AP Portable   Final Result      1. Stable cardiomegaly.  Hypoventilatory lung changes.         Electronically signed by: Franklyn Haddad MD   Date:    01/02/2023   Time:    16:06          Current Medications:     Scheduled:   albuterol-ipratropium  3 mL Nebulization Q4H WAKE    aspirin  81 mg Oral Daily    atorvastatin  20 mg Oral Daily    DULoxetine  30 mg Oral QHS    enoxaparin  40 mg Subcutaneous Daily    furosemide (LASIX) injection  80 mg Intravenous Daily    insulin detemir U-100  20 Units Subcutaneous BID    LIDOcaine  2 patch Transdermal Q24H    predniSONE  40 mg Oral Daily         Infusions:       PRN:  benzonatate, dextrose 10%, dextrose 10%, glucagon (human recombinant), glucose, glucose, insulin aspart U-100, naloxone, sodium chloride 0.9%  Antibiotics and Day Number of Therapy:  Antibiotics (From admission, onward)      None               Assessment:     Tadeo Riley is a 57-year-old male with PMH of CHF with preserved ejection fraction, diabetes, hypertension, morbid obesity who presented to [facility] on 1/2/23 with shortness of breath for 2 days. Patient is being admitted to [facility] medicine for heart failure exacerbation and hyperglycemia. Currently diuresing well with IV lasix. Increased long acting insulin dosage for better glycemic  control.     Plan:     Shortness of breath  Acute on chronic HFpEF vs asthma exacerbation    - patient feeling short of breath after recent URI, audible wheezing, and increased swelling in his abdomen. Reports he has not been urinating as much on his home lasix.   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  - glucose >500 on admit    - repeat A1C 11.6%  - no anion gap, no ketones in urine  - pro janie 0.06  - pending urine osms   - beta hydroxybutyrate wnl  - continue duloxetine for neuropathy   - Increased detemir to 20u BID     Hypertension   - normotensive on exam   - home meds include losartan and HCTZ  - will hold ARB in setting of MARTHA      Normocytic Anemia, unknown etiology   - hemoglobin 13.4, MCV 82   - asymptomatic   - iron studies pending      Hyperlipidemia  - continue home lipitor   - lipid panel with cholesterol 223, HDL 79 w/ lifetime cardiovascular risk calculated at 16.2%       Diet: Diet diabetic [facility]; 2800 Calorie; Cardiac (Low Na/Chol)   DVT Prophylaxis: LMWH  Code: Full Code     Dispo: pending improvement of volume status and glycemic control    Shashi Mclain M.D.  [facility] Neurology PGY-1    [facility] Medicine Hospitalist Pager numbers:   [facility] Hospitalist Medicine Team A (Alea/Alejandro): [phone]  [facility] Hospitalist Medicine Team B (Yamilka/Roseline):  [phone]

## 2023-01-03 NOTE — PROGRESS NOTES
"[facility]           Pharmacy  Admission Medication Reconciliation     Based on information gathered for medication list, you may go to "Admission" then "Reconcile Home Medications" tabs to review and/or act upon those items.     The home medication list has been updated by the Pharmacy department.   Please read ALL comments highlighted in red.   Please address this information as you see fit.    Feel free to contact us if you have any questions or require assistance.    Home medication list has been compared to current inpatient medications. Please review the following discrepancies noted below:    Patient reports STILL TAKING the following medication(s) which was not ordered upon admit  Losartan 50 mg     Feel free to contact us if you have any questions or require assistance.    Kostas Urban, PharmD  [phone]    "

## 2023-01-03 NOTE — NURSING
Pt trf to room 474 via stretcher with transport.  All personal belongings sent with patient. Pt's wife notified of trf.  All questions answered.

## 2023-01-03 NOTE — PLAN OF CARE
SOCIAL WORK DISCHARGE PLANNING ASSESSMENT    Sw completed discharge planning assessment with pt in ED 26. Pt was easily engaged and education on the role of  was provided. Pt stated he lives with his wife Nyla [phone]. Pt stated he has no DME and no HH. Pt stated he drives himself to doctor appointments. Nyla will provide transportation to family home following discharge. Pt was encouraged to call with any questions or concerns. Pt verbalized understanding.     Patient Active Problem List   Diagnosis    COPD with acute exacerbation    Acute hypoxemic respiratory failure    Essential hypertension    Type 2 diabetes mellitus, with long-term current use of insulin    MARTHA (acute kidney injury)    Hyperlipidemia    Microcytic anemia    Cough    SOB (shortness of breath)    Decreased range of motion of lumbar spine    Hamstring tightness of both lower extremities    Weakness of both lower extremities    Hip tightness    Erectile dysfunction    Asthma exacerbation    Normocytic anemia    Volume overload    Hyperglycemia        01/03/23 1339   Discharge Assessment   Assessment Type Discharge Planning Assessment   Confirmed/corrected address, phone number and insurance Yes   Confirmed Demographics Correct on Facesheet   Source of Information patient   Communicated GOLDIE with patient/caregiver Date not available/Unable to determine   People in Home spouse   Facility Arrived From: home   Do you expect to return to your current living situation? Yes   Do you have help at home or someone to help you manage your care at home? Yes   Who are your caregiver(s) and their phone number(s)? pt's wife Nyla Riley [phone]   Prior to hospitilization cognitive status: Alert/Oriented   Current cognitive status: Alert/Oriented   Equipment Currently Used at Home none   Readmission within 30 days? No   Patient currently being followed by outpatient case management? No   Do you currently have service(s) that help  you manage your care at home? No   Do you have prescription coverage? Yes   Coverage [employer]   Do you have any problems affording any of your prescribed medications? No   Is the patient taking medications as prescribed? yes   Who is going to help you get home at discharge? pt's wife Nyla Riley [phone]   How do you get to doctors appointments? car, drives self;family or friend will provide   Are you on dialysis? No   Do you take coumadin? No   Discharge Plan A Home with family   Discharge Plan B Home Health   DME Needed Upon Discharge    (TBD)   Discharge Plan discussed with: Patient   Discharge Barriers Identified None   Physical Activity   On average, how many days per week do you engage in moderate to strenuous exercise (like a brisk walk)? 3 days   On average, how many minutes do you engage in exercise at this level? 30 min   Financial Resource Strain   How hard is it for you to pay for the very basics like food, housing, medical care, and heating? Not hard   Housing Stability   In the last 12 months, was there a time when you were not able to pay the mortgage or rent on time? N   In the last 12 months, how many places have you lived? 1   In the last 12 months, was there a time when you did not have a steady place to sleep or slept in a shelter (including now)? N   Transportation Needs   In the past 12 months, has lack of transportation kept you from medical appointments or from getting medications? no   In the past 12 months, has lack of transportation kept you from meetings, work, or from getting things needed for daily living? No   Food Insecurity   Within the past 12 months, you worried that your food would run out before you got the money to buy more. Never true   Within the past 12 months, the food you bought just didn't last and you didn't have money to get more. Never true   Stress   Do you feel stress - tense, restless, nervous, or anxious, or unable to sleep at night because your mind is  troubled all the time - these days? Not at all   Social Connections   In a typical week, how many times do you talk on the phone with family, friends, or neighbors? More than 3   How often do you get together with friends or relatives? More than 3   Are you , , , , never , or living with a partner?    Alcohol Use   Q1: How often do you have a drink containing alcohol? Monthly or l   Q2: How many drinks containing alcohol do you have on a typical day when you are drinking? 1 or 2   Q3: How often do you have six or more drinks on one occasion? Never   OTHER   Name(s) of People in Home pt's wife Nyla Riley [phone]

## 2023-01-03 NOTE — PHARMACY MED REC
"[facility]           Pharmacy  Admission Medication History     The home medication history was taken by Randa Bustillos.      Medication history obtained from Medications listed below were obtained from: Patient/family    Based on information gathered for medication list, you may go to "Admission" then "Reconcile Home Medications" tabs to review and/or act upon those items.     The home medication list has been updated by the Pharmacy department.   Please read ALL comments highlighted in yellow.   Please address this information as you see fit.    Feel free to contact us if you have any questions or require assistance.    The medications listed below were removed from the home medication list.  Please reorder if appropriate:    Patient reports NOT TAKING the following medication(s):  Flexeril 10mg    No current facility-administered medications on file prior to encounter.     Current Outpatient Medications on File Prior to Encounter   Medication Sig Dispense Refill    albuterol (PROVENTIL/VENTOLIN HFA) 90 mcg/actuation inhaler Inhale 2 puffs into the lungs every 6 (six) hours as needed for Wheezing. Rescue 18 g 6    aspirin (ECOTRIN) 81 MG EC tablet Take 81 mg by mouth once daily.      DULoxetine (CYMBALTA) 30 MG capsule Take 30 mg by mouth nightly.      furosemide (LASIX) 40 MG tablet Take 40 mg by mouth once daily.      insulin NPH-insulin regular, 70/30, (NOVOLIN 70/30 U-100 INSULIN) 100 unit/mL (70-30) injection Inject 50 units twice a day by subcutaneous route. (Patient taking differently: Inject 50 Units into the skin 2 (two) times daily. Inject 50 units twice a day by subcutaneous route.) 30 mL 11    losartan (COZAAR) 50 MG tablet Take 50 mg by mouth once daily.      pravastatin (PRAVACHOL) 40 MG tablet Take 40 mg by mouth once daily.      losartan-hydrochlorothiazide 50-12.5 mg (HYZAAR) 50-12.5 mg per tablet losartan 50 mg-hydrochlorothiazide 12.5 mg tablet   TAKE ONE TABLET BY MOUTH " ONCE DAILY      spironolactone (ALDACTONE) 50 MG tablet Take 50 mg by mouth once daily.      tadalafiL (CIALIS) 10 MG tablet Take 10 mg by mouth daily as needed.      vitamin D (VITAMIN D3) 1000 units Tab Take by mouth.         Please address this information as you see fit.  Feel free to contact us if you have any questions or require assistance.    Rnada Bustillos  [phone]                  .

## 2023-01-03 NOTE — PLAN OF CARE
Pt on RA with documented sats. The proper method of use, as well as anticipated side effects, of this aerosol treatment are discussed and demonstrated to the patient.  Will continue to monitor.

## 2023-01-03 NOTE — PLAN OF CARE
Problem: Adult Inpatient Plan of Care  Goal: Plan of Care Review  Outcome: Ongoing, Progressing      01/03/23 1522   Admission   Initial VN Admission Questions Complete  (Patient arrived to unit, AAOx4. Admission questions completed. POC reviewes, allowed time for questions. Instructed patient to call for assistance.)   Communication Issues? None   Shift   Virtual Nurse - Rounding Complete   Virtual Nurse - Patient Verbalized Approval Of Camera Use;VN Rounding   Type of Frequent Check   Type Patient Rounds   Safety/Activity   Patient Rounds call light in patient/parent reach;visualized patient   Safety Promotion/Fall Prevention Fall Risk reviewed with patient/family;instructed to call staff for mobility   Positioning   Body Position position changed independently   Pain/Comfort/Sleep   Sleep/Rest/Relaxation awake

## 2023-01-04 LAB
ALBUMIN SERPL BCP-MCNC: 3.5 G/DL (ref 3.5–5.2)
ALP SERPL-CCNC: 86 U/L (ref 55–135)
ALT SERPL W/O P-5'-P-CCNC: 14 U/L (ref 10–44)
ANION GAP SERPL CALC-SCNC: 9 MMOL/L (ref 8–16)
AORTIC ROOT ANNULUS: 2.4 CM
AST SERPL-CCNC: 11 U/L (ref 10–40)
AV INDEX (PROSTH): 0.79
AV MEAN GRADIENT: 5 MMHG
AV PEAK GRADIENT: 7 MMHG
AV VALVE AREA: 2.49 CM2
AV VELOCITY RATIO: 0.78
BASOPHILS # BLD AUTO: 0.01 K/UL (ref 0–0.2)
BASOPHILS NFR BLD: 0.1 % (ref 0–1.9)
BILIRUB SERPL-MCNC: 0.5 MG/DL (ref 0.1–1)
BSA FOR ECHO PROCEDURE: 2.57 M2
BUN SERPL-MCNC: 31 MG/DL (ref 6–20)
CALCIUM SERPL-MCNC: 9.8 MG/DL (ref 8.7–10.5)
CHLORIDE SERPL-SCNC: 93 MMOL/L (ref 95–110)
CO2 SERPL-SCNC: 31 MMOL/L (ref 23–29)
CREAT SERPL-MCNC: 1.3 MG/DL (ref 0.5–1.4)
CV ECHO LV RWT: 0.4 CM
DIFFERENTIAL METHOD: ABNORMAL
DOP CALC AO PEAK VEL: 1.34 M/S
DOP CALC AO VTI: 31 CM
DOP CALC LVOT AREA: 3.1 CM2
DOP CALC LVOT DIAMETER: 2 CM
DOP CALC LVOT PEAK VEL: 1.04 M/S
DOP CALC LVOT STROKE VOLUME: 77.24 CM3
DOP CALC MV VTI: 19.4 CM
DOP CALCLVOT PEAK VEL VTI: 24.6 CM
E WAVE DECELERATION TIME: 205.79 MSEC
E/A RATIO: 0.76
E/E' RATIO: 7.73 M/S
ECHO LV POSTERIOR WALL: 1 CM (ref 0.6–1.1)
EJECTION FRACTION: 55 %
EOSINOPHIL # BLD AUTO: 0.1 K/UL (ref 0–0.5)
EOSINOPHIL NFR BLD: 0.5 % (ref 0–8)
ERYTHROCYTE [DISTWIDTH] IN BLOOD BY AUTOMATED COUNT: 13.7 % (ref 11.5–14.5)
EST. GFR  (NO RACE VARIABLE): >60 ML/MIN/1.73 M^2
FRACTIONAL SHORTENING: 52 % (ref 28–44)
GLUCOSE SERPL-MCNC: 405 MG/DL (ref 70–110)
HCT VFR BLD AUTO: 36.6 % (ref 40–54)
HGB BLD-MCNC: 13 G/DL (ref 14–18)
IMM GRANULOCYTES # BLD AUTO: 0.06 K/UL (ref 0–0.04)
IMM GRANULOCYTES NFR BLD AUTO: 0.5 % (ref 0–0.5)
INTERVENTRICULAR SEPTUM: 1.15 CM (ref 0.6–1.1)
LA MAJOR: 4.9 CM
LA MINOR: 4.8 CM
LA WIDTH: 4.1 CM
LEFT ATRIUM SIZE: 3.16 CM
LEFT ATRIUM VOLUME INDEX: 22 ML/M2
LEFT ATRIUM VOLUME: 53.41 CM3
LEFT INTERNAL DIMENSION IN SYSTOLE: 2.4 CM (ref 2.1–4)
LEFT VENTRICLE DIASTOLIC VOLUME INDEX: 52.07 ML/M2
LEFT VENTRICLE DIASTOLIC VOLUME: 126.52 ML
LEFT VENTRICLE MASS INDEX: 83 G/M2
LEFT VENTRICLE SYSTOLIC VOLUME INDEX: 15.5 ML/M2
LEFT VENTRICLE SYSTOLIC VOLUME: 37.56 ML
LEFT VENTRICULAR INTERNAL DIMENSION IN DIASTOLE: 5 CM (ref 3.5–6)
LEFT VENTRICULAR MASS: 200.72 G
LV LATERAL E/E' RATIO: 5.8 M/S
LV SEPTAL E/E' RATIO: 11.6 M/S
LVOT MG: 2.26 MMHG
LVOT MV: 0.72 CM/S
LYMPHOCYTES # BLD AUTO: 3.3 K/UL (ref 1–4.8)
LYMPHOCYTES NFR BLD: 25.8 % (ref 18–48)
MAGNESIUM SERPL-MCNC: 1.9 MG/DL (ref 1.6–2.6)
MCH RBC QN AUTO: 28 PG (ref 27–31)
MCHC RBC AUTO-ENTMCNC: 35.5 G/DL (ref 32–36)
MCV RBC AUTO: 79 FL (ref 82–98)
MONOCYTES # BLD AUTO: 0.8 K/UL (ref 0.3–1)
MONOCYTES NFR BLD: 6.5 % (ref 4–15)
MV MEAN GRADIENT: 1 MMHG
MV PEAK A VEL: 0.76 M/S
MV PEAK E VEL: 0.58 M/S
MV PEAK GRADIENT: 3 MMHG
MV VALVE AREA BY CONTINUITY EQUATION: 3.98 CM2
NEUTROPHILS # BLD AUTO: 8.5 K/UL (ref 1.8–7.7)
NEUTROPHILS NFR BLD: 66.6 % (ref 38–73)
NRBC BLD-RTO: 0 /100 WBC
PHOSPHATE SERPL-MCNC: 4.3 MG/DL (ref 2.7–4.5)
PLATELET # BLD AUTO: 286 K/UL (ref 150–450)
PMV BLD AUTO: 10.4 FL (ref 9.2–12.9)
POCT GLUCOSE: 386 MG/DL (ref 70–110)
POCT GLUCOSE: 405 MG/DL (ref 70–110)
POCT GLUCOSE: 421 MG/DL (ref 70–110)
POCT GLUCOSE: 430 MG/DL (ref 70–110)
POCT GLUCOSE: 479 MG/DL (ref 70–110)
POCT GLUCOSE: 487 MG/DL (ref 70–110)
POTASSIUM SERPL-SCNC: 3.9 MMOL/L (ref 3.5–5.1)
PROT SERPL-MCNC: 7.1 G/DL (ref 6–8.4)
PV PEAK VELOCITY: 1.2 CM/S
RA MAJOR: 4.6 CM
RA PRESSURE: 3 MMHG
RA WIDTH: 4.4 CM
RBC # BLD AUTO: 4.64 M/UL (ref 4.6–6.2)
RIGHT VENTRICULAR END-DIASTOLIC DIMENSION: 2.5 CM
RIGHT VENTRICULAR LENGTH IN DIASTOLE (APICAL 4-CHAMBER VIEW): 6.4 CM
SODIUM SERPL-SCNC: 133 MMOL/L (ref 136–145)
TDI LATERAL: 0.1 M/S
TDI SEPTAL: 0.05 M/S
TDI: 0.08 M/S
TRICUSPID ANNULAR PLANE SYSTOLIC EXCURSION: 1.5 CM
WBC # BLD AUTO: 12.67 K/UL (ref 3.9–12.7)

## 2023-01-04 PROCEDURE — 96376 TX/PRO/DX INJ SAME DRUG ADON: CPT

## 2023-01-04 PROCEDURE — 36415 COLL VENOUS BLD VENIPUNCTURE: CPT | Performed by: STUDENT IN AN ORGANIZED HEALTH CARE EDUCATION/TRAINING PROGRAM

## 2023-01-04 PROCEDURE — 94640 AIRWAY INHALATION TREATMENT: CPT | Mod: XB

## 2023-01-04 PROCEDURE — 25000003 PHARM REV CODE 250: Performed by: STUDENT IN AN ORGANIZED HEALTH CARE EDUCATION/TRAINING PROGRAM

## 2023-01-04 PROCEDURE — 84100 ASSAY OF PHOSPHORUS: CPT | Performed by: STUDENT IN AN ORGANIZED HEALTH CARE EDUCATION/TRAINING PROGRAM

## 2023-01-04 PROCEDURE — 11000001 HC ACUTE MED/SURG PRIVATE ROOM

## 2023-01-04 PROCEDURE — 99900035 HC TECH TIME PER 15 MIN (STAT)

## 2023-01-04 PROCEDURE — 63600175 PHARM REV CODE 636 W HCPCS: Performed by: STUDENT IN AN ORGANIZED HEALTH CARE EDUCATION/TRAINING PROGRAM

## 2023-01-04 PROCEDURE — 83735 ASSAY OF MAGNESIUM: CPT | Performed by: STUDENT IN AN ORGANIZED HEALTH CARE EDUCATION/TRAINING PROGRAM

## 2023-01-04 PROCEDURE — 94761 N-INVAS EAR/PLS OXIMETRY MLT: CPT

## 2023-01-04 PROCEDURE — 99223 1ST HOSP IP/OBS HIGH 75: CPT | Mod: GC,,, | Performed by: INTERNAL MEDICINE

## 2023-01-04 PROCEDURE — 25500020 PHARM REV CODE 255: Performed by: INTERNAL MEDICINE

## 2023-01-04 PROCEDURE — 99223 PR INITIAL HOSPITAL CARE,LEVL III: ICD-10-PCS | Mod: GC,,, | Performed by: INTERNAL MEDICINE

## 2023-01-04 PROCEDURE — 25000003 PHARM REV CODE 250

## 2023-01-04 PROCEDURE — 85025 COMPLETE CBC W/AUTO DIFF WBC: CPT | Performed by: STUDENT IN AN ORGANIZED HEALTH CARE EDUCATION/TRAINING PROGRAM

## 2023-01-04 PROCEDURE — 25000242 PHARM REV CODE 250 ALT 637 W/ HCPCS: Performed by: STUDENT IN AN ORGANIZED HEALTH CARE EDUCATION/TRAINING PROGRAM

## 2023-01-04 PROCEDURE — 80053 COMPREHEN METABOLIC PANEL: CPT | Performed by: STUDENT IN AN ORGANIZED HEALTH CARE EDUCATION/TRAINING PROGRAM

## 2023-01-04 RX ORDER — PREDNISONE 20 MG/1
20 TABLET ORAL DAILY
Status: COMPLETED | OUTPATIENT
Start: 2023-01-05 | End: 2023-01-06

## 2023-01-04 RX ORDER — SPIRONOLACTONE 25 MG/1
50 TABLET ORAL DAILY
Status: DISCONTINUED | OUTPATIENT
Start: 2023-01-04 | End: 2023-01-06 | Stop reason: HOSPADM

## 2023-01-04 RX ADMIN — INSULIN DETEMIR 20 UNITS: 100 INJECTION, SOLUTION SUBCUTANEOUS at 08:01

## 2023-01-04 RX ADMIN — IPRATROPIUM BROMIDE AND ALBUTEROL SULFATE 3 ML: .5; 3 SOLUTION RESPIRATORY (INHALATION) at 07:01

## 2023-01-04 RX ADMIN — INSULIN ASPART 5 UNITS: 100 INJECTION, SOLUTION INTRAVENOUS; SUBCUTANEOUS at 09:01

## 2023-01-04 RX ADMIN — SPIRONOLACTONE 50 MG: 25 TABLET, FILM COATED ORAL at 03:01

## 2023-01-04 RX ADMIN — INSULIN DETEMIR 20 UNITS: 100 INJECTION, SOLUTION SUBCUTANEOUS at 09:01

## 2023-01-04 RX ADMIN — IPRATROPIUM BROMIDE AND ALBUTEROL SULFATE 3 ML: .5; 3 SOLUTION RESPIRATORY (INHALATION) at 11:01

## 2023-01-04 RX ADMIN — PREDNISONE 40 MG: 20 TABLET ORAL at 08:01

## 2023-01-04 RX ADMIN — ATORVASTATIN CALCIUM 20 MG: 20 TABLET, FILM COATED ORAL at 08:01

## 2023-01-04 RX ADMIN — DULOXETINE 30 MG: 30 CAPSULE, DELAYED RELEASE ORAL at 09:01

## 2023-01-04 RX ADMIN — IOHEXOL 100 ML: 350 INJECTION, SOLUTION INTRAVENOUS at 05:01

## 2023-01-04 RX ADMIN — INSULIN ASPART 5 UNITS: 100 INJECTION, SOLUTION INTRAVENOUS; SUBCUTANEOUS at 12:01

## 2023-01-04 RX ADMIN — LIDOCAINE 2 PATCH: 50 PATCH CUTANEOUS at 09:01

## 2023-01-04 RX ADMIN — INSULIN ASPART 10 UNITS: 100 INJECTION, SOLUTION INTRAVENOUS; SUBCUTANEOUS at 12:01

## 2023-01-04 RX ADMIN — IPRATROPIUM BROMIDE AND ALBUTEROL SULFATE 3 ML: .5; 3 SOLUTION RESPIRATORY (INHALATION) at 08:01

## 2023-01-04 RX ADMIN — INSULIN ASPART 10 UNITS: 100 INJECTION, SOLUTION INTRAVENOUS; SUBCUTANEOUS at 04:01

## 2023-01-04 RX ADMIN — INSULIN ASPART 10 UNITS: 100 INJECTION, SOLUTION INTRAVENOUS; SUBCUTANEOUS at 08:01

## 2023-01-04 RX ADMIN — ENOXAPARIN SODIUM 40 MG: 40 INJECTION SUBCUTANEOUS at 04:01

## 2023-01-04 RX ADMIN — ASPIRIN 81 MG: 81 TABLET, COATED ORAL at 08:01

## 2023-01-04 RX ADMIN — FUROSEMIDE 80 MG: 10 INJECTION, SOLUTION INTRAMUSCULAR; INTRAVENOUS at 08:01

## 2023-01-04 RX ADMIN — IPRATROPIUM BROMIDE AND ALBUTEROL SULFATE 3 ML: .5; 3 SOLUTION RESPIRATORY (INHALATION) at 03:01

## 2023-01-04 RX ADMIN — BENZONATATE 100 MG: 100 CAPSULE ORAL at 12:01

## 2023-01-04 NOTE — PLAN OF CARE
JAY informed pt declined PCC follow up. Pt reports wanting to resume care with PCP who is Salazar Miranda. [facility]ul[facility] JAY added PCP to chart.     Future Appointments   Date Time Provider Department Center   1/4/2023  4:30 PM [facility] CT1 LIMIT 450 LBS [facility] CT SCAN DONNA Lopez Case Management  [phone]

## 2023-01-04 NOTE — PLAN OF CARE
SW met with pt via [employer] to complete assessment. Pt is AxO x3 and able to verbally answer assessment questions.  Pt able to confirm demographic information. Pt reports living at home with his wife. Pt would like all mail to go to PO Box which he confirmed is correct. At time of discharge pt to discharge to [address] and spouse to transport home. While at home pt reports to have enough support provided by his spouse who he has been  to for 32yrs. Pt reports being independent of ADL's and no current DME in use. Pt reports being informed he is to have a nebulizer at time of discharge. Please place nebulizer orders and provide education to pt. SW updated whiteboard with [facility] name and contact information. JAY confirmed pt understanding of Observation unit and expected discharge plan. JAY will continue to follow pt throughout care and assist with any discharge needs.         01/04/23 0959   Discharge Planning   Assessment Type Discharge Planning Brief Assessment   Support Systems Spouse/significant other   Equipment Currently Used at Home none   Current Living Arrangements home   Patient/Family Anticipates Transition to home with family   Patient/Family Anticipated Services at Transition none   DME Needed Upon Discharge  nebulizer   Discharge Plan A Home with family     No future appointments.    DONNA Gallegos Case Management  [phone]

## 2023-01-04 NOTE — PLAN OF CARE
Room air SpO2 = 97% . Patient with no SOB.  CRC will continue to follow.     Patient given nebulized treatment. Patient instructed on proper breathing technique, along with benefits of therapy.

## 2023-01-04 NOTE — PROGRESS NOTES
"[facility] Medicine Progress Note    Primary Team: [facility] Hospitalist Team B  Attending Physician: Serafin Prather MD  Resident: Rehan  Intern: [facility] Day: 0     Chief Complaint: SOB x 2-days   Subjective:      Interval: No acute events overnight with patient UOP ~2.7L yesterday and 0.7L overnight    This AM: Patient seen and examined by me this morning. Still endorses abdominal and upper back tightness and fullness. Still with mild cough. No other complaints at this time             Review of Systems   Constitutional:  Negative for chills and fever.   HENT:  Negative for congestion and sore throat.    Respiratory:  Positive for cough, shortness of breath and wheezing.    Cardiovascular:  Negative for chest pain, palpitations and leg swelling.        Tightness/edema/swelling of upper back     Gastrointestinal:  Negative for abdominal pain and vomiting.        Abdominal swelling   Genitourinary:  Negative for dysuria and frequency.   Musculoskeletal:  Negative for back pain and joint pain.   Skin:  Negative for itching and rash.   Neurological:  Negative for dizziness and headaches.   Psychiatric/Behavioral:  The patient is not nervous/anxious and does not have insomnia.    All other systems reviewed and are negative.       Past Medical History:   Diagnosis Date    CHF (congestive heart failure)     Diabetes mellitus     Hypertension     Obesity               Objective:   Last 24 Hour Vital Signs:  BP  Min: 134/72  Max: 165/80  Temp  Av.1 °F (36.2 °C)  Min: 96.2 °F (35.7 °C)  Max: 98.6 °F (37 °C)  Pulse  Av.2  Min: 80  Max: 100  Resp  Av.6  Min: 16  Max: 22  SpO2  Av.8 %  Min: 84 %  Max: 98 %  Height  Av' 7" (170.2 cm)  Min: 5' 7" (170.2 cm)  Max: 5' 7" (170.2 cm)  Weight  Av.9 kg (308 lb 5.2 oz)  Min: 139.7 kg (308 lb)  Max: 140 kg (308 lb 10.3 oz)    Intake/Output Summary (Last 24 hours) at 2023 0852  Last data filed at 2023 0715  Gross per 24 hour   Intake 800 ml "   Output 3050 ml   Net -2250 ml        Physical Examination:  Physical Exam  Constitutional:       Appearance: He is obese.   HENT:      Head: Normocephalic.      Right Ear: External ear normal.      Left Ear: External ear normal.      Nose: Nose normal.      Mouth/Throat:      Mouth: Mucous membranes are moist.      Pharynx: Oropharynx is clear.   Eyes:      Extraocular Movements: Extraocular movements intact.      Pupils: Pupils are equal, round, and reactive to light.   Cardiovascular:      Rate and Rhythm: Normal rate and regular rhythm.      Pulses: Normal pulses.      Heart sounds: Normal heart sounds.      Comments: Pitting edema present at upper back  Pulmonary:      Effort: Pulmonary effort is normal.      Breath sounds: Wheezing and rales present.      Comments: Rales present over bilateral lung fields with end expiratory wheezing  Abdominal:      General: Bowel sounds are normal. There is distension.      Tenderness: There is no abdominal tenderness. There is no guarding.   Musculoskeletal:      Right lower leg: No edema.      Left lower leg: No edema.   Skin:     General: Skin is warm and dry.      Capillary Refill: Capillary refill takes less than 2 seconds.      Findings: No erythema or rash.   Neurological:      General: No focal deficit present.      Mental Status: He is alert. Mental status is at baseline.   Psychiatric:         Mood and Affect: Mood normal.         Behavior: Behavior normal.      Laboratory:  Recent Labs   Lab 01/01/23  0157 01/02/23  1554 01/02/23  1654 01/03/23  0554 01/04/23  0406   WBC 7.09  --  8.49 11.96 12.67   HGB 13.3*  --  13.4* 14.0 13.0*   HCT 38.8*  --  38.4* 40.0 36.6*     --  279 315 286   MCV 83  --  82 81* 79*   RDW 14.0  --  13.9 13.5 13.7    134*  --  134* 133*   K 4.2 4.4  --  4.8 3.9    92*  --  94* 93*   CO2 29 28  --  28 31*   BUN 14 13  --  21* 31*   CREATININE 0.71 1.3  --  1.2 1.3   * 566*  --  407* 405*   PROT 7.3 7.7  --  7.4  7.1   ALBUMIN 4.2 3.8  --  3.6 3.5   BILITOT 0.5 0.4  --  0.5 0.5   AST 28 14  --  13 11   ALKPHOS 100 103  --  98 86   ALT 27 20  --  21 14         Urinalysis  No results for input(s): COLORU, CLARITYU, SPECGRAV, PHUR, PROTEINUA, GLUCOSEU, BILIRUBINCON, BLOODU, WBCU, RBCU, BACTERIA, MUCUS, NITRITE, LEUKOCYTESUR, UROBILINOGEN, HYALINECASTS in the last 24 hours.    Microbiology Results (last 7 days)       Procedure Component Value Units Date/Time    Influenza A & B by Molecular [[id number]] Collected: 01/02/23 1535    Order Status: Completed Specimen: Nasopharyngeal Swab Updated: 01/02/23 1557     Influenza A, Molecular Negative     Influenza B, Molecular Negative     Flu A & B Source Nasal swab             I have personally reviewed the above laboratory studies.     Imaging:  EKG (my interpretation): No new today    US Abdomen Limited   Final Result      X-Ray Chest AP Portable   Final Result      1. Stable cardiomegaly.  Hypoventilatory lung changes.         Electronically signed by: Franklyn Haddad MD   Date:    01/02/2023   Time:    16:06          Current Medications:     Scheduled:   albuterol-ipratropium  3 mL Nebulization Q4H WAKE    aspirin  81 mg Oral Daily    atorvastatin  20 mg Oral Daily    DULoxetine  30 mg Oral QHS    enoxaparin  40 mg Subcutaneous Daily    furosemide (LASIX) injection  80 mg Intravenous Daily    insulin detemir U-100  20 Units Subcutaneous BID    LIDOcaine  2 patch Transdermal Q24H    predniSONE  40 mg Oral Daily         Infusions:       PRN:  benzonatate, dextrose 10%, dextrose 10%, glucagon (human recombinant), glucose, glucose, influenza, insulin aspart U-100, naloxone, sodium chloride 0.9%  Antibiotics and Day Number of Therapy:  Antibiotics (From admission, onward)      None               Assessment:     Tadeo Riley is a 57-year-old male with PMH of CHF with preserved ejection fraction, diabetes, hypertension, morbid obesity who [facility] with  shortness of breath for 2 days. Patient is being admitted to [facility] medicine for heart failure exacerbation and hyperglycemia. Currently diuresing well with IV lasix. Increased long acting insulin dosage for better glycemic control. Awaiting TTE and further volume improvement.    Plan:     Shortness of breath  Acute on chronic HFpEF vs asthma exacerbation    - patient feeling short of breath after recent URI, audible wheezing, and increased swelling in his abdomen and upper back. Reports he has not been urinating as much on his home lasix.   - BNP <10  - satting 92-94% on room air   - trop 0.021, stable from yesterday   - holding home losartan in setting of MARTHA   - not currently on beta blocker  - TTE pending   - diuresing with IV lasix 80  - received methylpred in ED, starting prednisone 40 mg daily   - continue scheduled duo nebs   - strict I/Os   - continues telemetry   - UOP ~2.7L on 1/3/23  - I/O with -4.3L since admit  - continue diuresis      MARTHA  - Cr 1.3 on admission, up from 0.7 yesterday   - possibly cardiorenal 2/2 to heart failure exacerbation   - holding home losartan   - urine studies with FEUrea 22.8% (pre-renal)--->likely cardiorenal etiology   - continue to monitor      Hyperglycemia   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  - glucose >500 on admit    - repeat A1C 11.6%  - no anion gap, no ketones in urine  - pro janie 0.06  - urine osms 515   - beta hydroxybutyrate wnl  - continue duloxetine for neuropathy   - Increased detemir to 20u BID     Hypertension   - normotensive on exam   - home meds include losartan and HCTZ  - will hold ARB in setting of MARTHA      Normocytic Anemia, unknown etiology   - hemoglobin 13.4, MCV 82   - asymptomatic   - iron studies pending      Hyperlipidemia  - continue home lipitor   - lipid panel with cholesterol 223, HDL 79 w/ lifetime cardiovascular risk calculated at 16.2%       Diet: Diet diabetic [facility]; 2800 Calorie; Cardiac (Low Na/Chol)   DVT  Prophylaxis: LMWH  Code: Full Code     Dispo: pending improvement of volume status and glycemic control    Shashi Mclain M.D.  [facility] Neurology PGY-1    [facility] Medicine Hospitalist Pager numbers:   [facility] Hospitalist Medicine Team A (Alea/Alejandro): [phone]  [facility] Hospitalist Medicine Team B (Yamilka/Roseline):  [phone]

## 2023-01-04 NOTE — TREATMENT PLAN
PCC nurse met with patient to discuss PCC appt; pt declines PCC apt and wants to see his own PCP. CM updated and made aware. PCC apt canceled.      Mauro Fair RN-[facility]  [phone]
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
## 2023-01-05 PROBLEM — R06.2 WHEEZING: Status: ACTIVE | Noted: 2023-01-05

## 2023-01-05 LAB
ALBUMIN SERPL BCP-MCNC: 3.6 G/DL (ref 3.5–5.2)
ALP SERPL-CCNC: 92 U/L (ref 55–135)
ALT SERPL W/O P-5'-P-CCNC: 15 U/L (ref 10–44)
ANION GAP SERPL CALC-SCNC: 12 MMOL/L (ref 8–16)
AST SERPL-CCNC: 10 U/L (ref 10–40)
BASOPHILS # BLD AUTO: 0.03 K/UL (ref 0–0.2)
BASOPHILS NFR BLD: 0.3 % (ref 0–1.9)
BILIRUB SERPL-MCNC: 0.5 MG/DL (ref 0.1–1)
BUN SERPL-MCNC: 28 MG/DL (ref 6–20)
CALCIUM SERPL-MCNC: 9.4 MG/DL (ref 8.7–10.5)
CHLORIDE SERPL-SCNC: 93 MMOL/L (ref 95–110)
CO2 SERPL-SCNC: 28 MMOL/L (ref 23–29)
CREAT SERPL-MCNC: 1.2 MG/DL (ref 0.5–1.4)
DIFFERENTIAL METHOD: ABNORMAL
EOSINOPHIL # BLD AUTO: 0.1 K/UL (ref 0–0.5)
EOSINOPHIL NFR BLD: 0.6 % (ref 0–8)
ERYTHROCYTE [DISTWIDTH] IN BLOOD BY AUTOMATED COUNT: 13.4 % (ref 11.5–14.5)
EST. GFR  (NO RACE VARIABLE): >60 ML/MIN/1.73 M^2
GLUCOSE SERPL-MCNC: 374 MG/DL (ref 70–110)
HCT VFR BLD AUTO: 37.1 % (ref 40–54)
HGB BLD-MCNC: 13 G/DL (ref 14–18)
IMM GRANULOCYTES # BLD AUTO: 0.05 K/UL (ref 0–0.04)
IMM GRANULOCYTES NFR BLD AUTO: 0.4 % (ref 0–0.5)
LYMPHOCYTES # BLD AUTO: 3.4 K/UL (ref 1–4.8)
LYMPHOCYTES NFR BLD: 29.3 % (ref 18–48)
MAGNESIUM SERPL-MCNC: 2.1 MG/DL (ref 1.6–2.6)
MCH RBC QN AUTO: 28.3 PG (ref 27–31)
MCHC RBC AUTO-ENTMCNC: 35 G/DL (ref 32–36)
MCV RBC AUTO: 81 FL (ref 82–98)
MONOCYTES # BLD AUTO: 0.8 K/UL (ref 0.3–1)
MONOCYTES NFR BLD: 6.6 % (ref 4–15)
NEUTROPHILS # BLD AUTO: 7.3 K/UL (ref 1.8–7.7)
NEUTROPHILS NFR BLD: 62.8 % (ref 38–73)
NRBC BLD-RTO: 0 /100 WBC
PHOSPHATE SERPL-MCNC: 3.7 MG/DL (ref 2.7–4.5)
PLATELET # BLD AUTO: 276 K/UL (ref 150–450)
PMV BLD AUTO: 10.4 FL (ref 9.2–12.9)
POCT GLUCOSE: 349 MG/DL (ref 70–110)
POCT GLUCOSE: 415 MG/DL (ref 70–110)
POCT GLUCOSE: 435 MG/DL (ref 70–110)
POCT GLUCOSE: >500 MG/DL (ref 70–110)
POTASSIUM SERPL-SCNC: 3.8 MMOL/L (ref 3.5–5.1)
PROT SERPL-MCNC: 7.1 G/DL (ref 6–8.4)
RBC # BLD AUTO: 4.6 M/UL (ref 4.6–6.2)
SODIUM SERPL-SCNC: 133 MMOL/L (ref 136–145)
WBC # BLD AUTO: 11.62 K/UL (ref 3.9–12.7)

## 2023-01-05 PROCEDURE — 97161 PT EVAL LOW COMPLEX 20 MIN: CPT

## 2023-01-05 PROCEDURE — 25000003 PHARM REV CODE 250: Performed by: STUDENT IN AN ORGANIZED HEALTH CARE EDUCATION/TRAINING PROGRAM

## 2023-01-05 PROCEDURE — 25000242 PHARM REV CODE 250 ALT 637 W/ HCPCS: Performed by: STUDENT IN AN ORGANIZED HEALTH CARE EDUCATION/TRAINING PROGRAM

## 2023-01-05 PROCEDURE — 94761 N-INVAS EAR/PLS OXIMETRY MLT: CPT

## 2023-01-05 PROCEDURE — 63600175 PHARM REV CODE 636 W HCPCS: Performed by: STUDENT IN AN ORGANIZED HEALTH CARE EDUCATION/TRAINING PROGRAM

## 2023-01-05 PROCEDURE — 36415 COLL VENOUS BLD VENIPUNCTURE: CPT | Performed by: STUDENT IN AN ORGANIZED HEALTH CARE EDUCATION/TRAINING PROGRAM

## 2023-01-05 PROCEDURE — 97165 OT EVAL LOW COMPLEX 30 MIN: CPT

## 2023-01-05 PROCEDURE — 99231 PR SUBSEQUENT HOSPITAL CARE,LEVL I: ICD-10-PCS | Mod: ,,, | Performed by: INTERNAL MEDICINE

## 2023-01-05 PROCEDURE — 84100 ASSAY OF PHOSPHORUS: CPT | Performed by: STUDENT IN AN ORGANIZED HEALTH CARE EDUCATION/TRAINING PROGRAM

## 2023-01-05 PROCEDURE — 99231 SBSQ HOSP IP/OBS SF/LOW 25: CPT | Mod: ,,, | Performed by: INTERNAL MEDICINE

## 2023-01-05 PROCEDURE — 85025 COMPLETE CBC W/AUTO DIFF WBC: CPT | Performed by: STUDENT IN AN ORGANIZED HEALTH CARE EDUCATION/TRAINING PROGRAM

## 2023-01-05 PROCEDURE — 11000001 HC ACUTE MED/SURG PRIVATE ROOM

## 2023-01-05 PROCEDURE — 83735 ASSAY OF MAGNESIUM: CPT | Performed by: STUDENT IN AN ORGANIZED HEALTH CARE EDUCATION/TRAINING PROGRAM

## 2023-01-05 PROCEDURE — 80053 COMPREHEN METABOLIC PANEL: CPT | Performed by: STUDENT IN AN ORGANIZED HEALTH CARE EDUCATION/TRAINING PROGRAM

## 2023-01-05 PROCEDURE — 94640 AIRWAY INHALATION TREATMENT: CPT

## 2023-01-05 PROCEDURE — 63600175 PHARM REV CODE 636 W HCPCS

## 2023-01-05 PROCEDURE — 25000003 PHARM REV CODE 250

## 2023-01-05 RX ORDER — PREDNISONE 20 MG/1
20 TABLET ORAL DAILY
Qty: 2 TABLET | Refills: 0 | Status: SHIPPED | OUTPATIENT
Start: 2023-01-06 | End: 2023-01-06 | Stop reason: HOSPADM

## 2023-01-05 RX ORDER — LOSARTAN POTASSIUM AND HYDROCHLOROTHIAZIDE 25; 100 MG/1; MG/1
1 TABLET ORAL DAILY
Qty: 90 TABLET | Refills: 3 | Status: SHIPPED | OUTPATIENT
Start: 2023-01-05 | End: 2024-03-27

## 2023-01-05 RX ADMIN — IPRATROPIUM BROMIDE AND ALBUTEROL SULFATE 3 ML: .5; 3 SOLUTION RESPIRATORY (INHALATION) at 11:01

## 2023-01-05 RX ADMIN — IPRATROPIUM BROMIDE AND ALBUTEROL SULFATE 3 ML: .5; 3 SOLUTION RESPIRATORY (INHALATION) at 03:01

## 2023-01-05 RX ADMIN — ENOXAPARIN SODIUM 40 MG: 40 INJECTION SUBCUTANEOUS at 05:01

## 2023-01-05 RX ADMIN — SPIRONOLACTONE 50 MG: 25 TABLET, FILM COATED ORAL at 10:01

## 2023-01-05 RX ADMIN — BENZONATATE 100 MG: 100 CAPSULE ORAL at 12:01

## 2023-01-05 RX ADMIN — DULOXETINE 30 MG: 30 CAPSULE, DELAYED RELEASE ORAL at 08:01

## 2023-01-05 RX ADMIN — INSULIN ASPART 10 UNITS: 100 INJECTION, SOLUTION INTRAVENOUS; SUBCUTANEOUS at 04:01

## 2023-01-05 RX ADMIN — INSULIN ASPART 5 UNITS: 100 INJECTION, SOLUTION INTRAVENOUS; SUBCUTANEOUS at 08:01

## 2023-01-05 RX ADMIN — ATORVASTATIN CALCIUM 20 MG: 20 TABLET, FILM COATED ORAL at 10:01

## 2023-01-05 RX ADMIN — PREDNISONE 20 MG: 20 TABLET ORAL at 10:01

## 2023-01-05 RX ADMIN — IPRATROPIUM BROMIDE AND ALBUTEROL SULFATE 3 ML: .5; 3 SOLUTION RESPIRATORY (INHALATION) at 07:01

## 2023-01-05 RX ADMIN — INSULIN DETEMIR 20 UNITS: 100 INJECTION, SOLUTION SUBCUTANEOUS at 11:01

## 2023-01-05 RX ADMIN — INSULIN ASPART 8 UNITS: 100 INJECTION, SOLUTION INTRAVENOUS; SUBCUTANEOUS at 11:01

## 2023-01-05 RX ADMIN — ASPIRIN 81 MG: 81 TABLET, COATED ORAL at 10:01

## 2023-01-05 RX ADMIN — FUROSEMIDE 80 MG: 10 INJECTION, SOLUTION INTRAMUSCULAR; INTRAVENOUS at 10:01
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
## 2023-01-05 NOTE — PLAN OF CARE
Problem: Physical Therapy  Goal: Physical Therapy Goal  Outcome: Met     Pt functioning independently and reporting being at his baseline. No further IP PT needs, d/c PT.

## 2023-01-05 NOTE — PROGRESS NOTES
[facility]                    Pharmacy       Discharge Medication Education    Patient ACCEPTED medication education. Pharmacy has provided education on the name, indication, and possible side effects of the medication(s) prescribed, using teach-back method.     The following medications have also been discussed, during this admission.        Medication List        START taking these medications      losartan-hydrochlorothiazide 100-25 mg 100-25 mg per tablet  Commonly known as: HYZAAR  Take 1 tablet by mouth once daily.  Replaces: losartan-hydrochlorothiazide 50-12.5 mg 50-12.5 mg per tablet     predniSONE 20 MG tablet  Commonly known as: DELTASONE  Take 1 tablet (20 mg total) by mouth once daily.  Start taking on: January 6, 2023            CHANGE how you take these medications      insulin NPH-insulin regular (70/30) 100 unit/mL (70-30) injection  Commonly known as: NovoLIN 70/30 U-100 Insulin  Inject 50 units twice a day by subcutaneous route.  What changed:   how much to take  how to take this  when to take this            CONTINUE taking these medications      albuterol 90 mcg/actuation inhaler  Commonly known as: PROVENTIL/VENTOLIN HFA  Inhale 2 puffs into the lungs every 6 (six) hours as needed for Wheezing. Rescue     aspirin 81 MG EC tablet  Commonly known as: ECOTRIN     DULoxetine 30 MG capsule  Commonly known as: CYMBALTA     furosemide 40 MG tablet  Commonly known as: LASIX     pravastatin 40 MG tablet  Commonly known as: PRAVACHOL     spironolactone 50 MG tablet  Commonly known as: ALDACTONE     tadalafiL 10 MG tablet  Commonly known as: CIALIS     vitamin D 1000 units Tab  Commonly known as: VITAMIN D3            STOP taking these medications      losartan 50 MG tablet  Commonly known as: COZAAR     losartan-hydrochlorothiazide 50-12.5 mg 50-12.5 mg per tablet  Commonly known as: HYZAAR  Replaced by: losartan-hydrochlorothiazide 100-25 mg 100-25 mg per tablet               Where  to Get Your Medications        These medications were sent to [id number] - [place], [place] - [address] [facility]  [address] [facility] [address]      Phone: [phone]   losartan-hydrochlorothiazide 100-25 mg 100-25 mg per tablet  predniSONE 20 MG tablet          Thank you  Sravani Real, PharmD  [phone]

## 2023-01-05 NOTE — PROGRESS NOTES
[facility] Medicine Progress Note    Primary Team: [facility] Hospitalist Team B  Attending Physician: Serafin Prather MD  Resident: Rehan  Intern: [facility] Day: 1     Chief Complaint: SOB x 2-days   Subjective:      Interval: No acute events overnight with patient UOP ~1.4L    This AM: Patient seen and examined by me this morning. Feels like his back and abdominal swelling has improved, as well as his breathing. No additional complaints this AM.            Review of Systems   Constitutional:  Negative for chills and fever.   HENT:  Negative for congestion and sore throat.    Respiratory:  Positive for wheezing. Negative for cough and shortness of breath.    Cardiovascular:  Negative for chest pain, palpitations and leg swelling.        Tightness/edema/swelling of upper back     Gastrointestinal:  Negative for abdominal pain and vomiting.        Abdominal swelling   Genitourinary:  Negative for dysuria and frequency.   Musculoskeletal:  Negative for back pain and joint pain.   Skin:  Negative for itching and rash.   Neurological:  Negative for dizziness and headaches.   Psychiatric/Behavioral:  The patient is not nervous/anxious and does not have insomnia.    All other systems reviewed and are negative.       Past Medical History:   Diagnosis Date    CHF (congestive heart failure)     Diabetes mellitus     Hypertension     Obesity               Objective:   Last 24 Hour Vital Signs:  BP  Min: 141/67  Max: 163/76  Temp  Av.4 °F (36.3 °C)  Min: 96.2 °F (35.7 °C)  Max: 98.8 °F (37.1 °C)  Pulse  Av.7  Min: 81  Max: 92  Resp  Av.6  Min: 16  Max: 19  SpO2  Av.7 %  Min: 92 %  Max: 99 %    Intake/Output Summary (Last 24 hours) at 2023 0720  Last data filed at 2023 0546  Gross per 24 hour   Intake 450 ml   Output 2700 ml   Net -2250 ml        Physical Examination:  Physical Exam  Constitutional:       Appearance: He is obese.   HENT:      Head: Normocephalic.      Right Ear: External ear  normal.      Left Ear: External ear normal.      Nose: Nose normal.      Mouth/Throat:      Mouth: Mucous membranes are moist.      Pharynx: Oropharynx is clear.   Eyes:      Extraocular Movements: Extraocular movements intact.      Pupils: Pupils are equal, round, and reactive to light.   Cardiovascular:      Rate and Rhythm: Normal rate and regular rhythm.      Pulses: Normal pulses.      Heart sounds: Normal heart sounds.      Comments: Pitting edema present at upper back improved today  Pulmonary:      Effort: Pulmonary effort is normal.      Breath sounds: Wheezing present. No rales.      Comments: end expiratory wheezing  Abdominal:      General: Bowel sounds are normal. There is distension.      Tenderness: There is no abdominal tenderness. There is no guarding.   Musculoskeletal:      Right lower leg: No edema.      Left lower leg: No edema.   Skin:     General: Skin is warm and dry.      Capillary Refill: Capillary refill takes less than 2 seconds.      Findings: No erythema or rash.   Neurological:      General: No focal deficit present.      Mental Status: He is alert. Mental status is at baseline.   Psychiatric:         Mood and Affect: Mood normal.         Behavior: Behavior normal.      Laboratory:  Recent Labs   Lab 01/01/23  0157 01/02/23  1554 01/02/23  1654 01/03/23  0554 01/04/23  0406 01/05/23  0351   WBC 7.09  --  8.49 11.96 12.67 11.62   HGB 13.3*  --  13.4* 14.0 13.0* 13.0*   HCT 38.8*  --  38.4* 40.0 36.6* 37.1*     --  279 315 286 276   MCV 83  --  82 81* 79* 81*   RDW 14.0  --  13.9 13.5 13.7 13.4    134*  --  134* 133* 133*   K 4.2 4.4  --  4.8 3.9 3.8    92*  --  94* 93* 93*   CO2 29 28  --  28 31* 28   BUN 14 13  --  21* 31* 28*   CREATININE 0.71 1.3  --  1.2 1.3 1.2   * 566*  --  407* 405* 374*   PROT 7.3 7.7  --  7.4 7.1 7.1   ALBUMIN 4.2 3.8  --  3.6 3.5 3.6   BILITOT 0.5 0.4  --  0.5 0.5 0.5   AST 28 14  --  13 11 10   ALKPHOS 100 103  --  98 86 92   ALT 27  20  --  21 14 15         Urinalysis  No results for input(s): COLORU, CLARITYU, SPECGRAV, PHUR, PROTEINUA, GLUCOSEU, BILIRUBINCON, BLOODU, WBCU, RBCU, BACTERIA, MUCUS, NITRITE, LEUKOCYTESUR, UROBILINOGEN, HYALINECASTS in the last 24 hours.    Microbiology Results (last 7 days)       Procedure Component Value Units Date/Time    Influenza A & B by Molecular [[id number]] Collected: 01/02/23 1535    Order Status: Completed Specimen: Nasopharyngeal Swab Updated: 01/02/23 1557     Influenza A, Molecular Negative     Influenza B, Molecular Negative     Flu A & B Source Nasal swab             I have personally reviewed the above laboratory studies.     Imaging:  EKG (my interpretation): No new today    CTA Chest Non-Coronary (PE Studies)   Final Result      There is no large central saddle type pulmonary embolus, sensitivity of the examination is diminished due to diminished opacification and artifactual heterogeneity of the pulmonary arterial vascular, mid size and small vessel pulmonary emboli cannot be excluded, however on close evaluation the pattern is consistent with artifact and an abnormal pattern of pulmonary arterial filling defects specific for pulmonary emboli is not seen.  Close clinical and historical correlation is otherwise needed to determine need for additional imaging or therapy.      Suspected small thyroid nodules, most prominent measures up to approximately 10 mm.      Additional findings as above.         Electronically signed by: Stef Zendejas   Date:    01/04/2023   Time:    21:21      US Abdomen Limited   Final Result      X-Ray Chest AP Portable   Final Result      1. Stable cardiomegaly.  Hypoventilatory lung changes.         Electronically signed by: Franklyn Haddad MD   Date:    01/02/2023   Time:    16:06          Current Medications:     Scheduled:   albuterol-ipratropium  3 mL Nebulization Q4H WAKE    aspirin  81 mg Oral Daily    atorvastatin  20 mg Oral Daily    DULoxetine  30 mg Oral QHS     enoxaparin  40 mg Subcutaneous Daily    furosemide (LASIX) injection  80 mg Intravenous Daily    insulin detemir U-100  20 Units Subcutaneous BID    LIDOcaine  2 patch Transdermal Q24H    predniSONE  20 mg Oral Daily    spironolactone  50 mg Oral Daily         Infusions:       PRN:  benzonatate, dextrose 10%, dextrose 10%, glucagon (human recombinant), glucose, glucose, influenza, insulin aspart U-100, naloxone, sodium chloride 0.9%  Antibiotics and Day Number of Therapy:  Antibiotics (From admission, onward)      None               Assessment:     Tadeo Riley is a 57-year-old male with PMH of CHF with preserved ejection fraction, diabetes, hypertension, morbid obesity who presented to [facility] on 1/2/23 with shortness of breath for 2 days. Patient is being admitted to [facility] medicine for heart failure exacerbation and hyperglycemia. Currently diuresing well with IV lasix. Increased long acting insulin dosage for better glycemic control. Cardiology consulted and feel etiology likely unapparent diastolic dysfunction not observed on formal echo combined with poorly controlled hypertension in the setting of asthma exacerbation. Likely discharge this afternoon with close follow up outpatient for PFTs, sleep study, and adjustment of hypertensive and diabetic regimen    Plan:     Shortness of breath  Acute on chronic HFpEF vs asthma exacerbation    - patient feeling short of breath after recent URI, audible wheezing, and increased swelling in his abdomen and upper back. Reports he has not been urinating as much on his home lasix.   - BNP <10  - satting 92-94% on room air   - trop 0.021, stable from yesterday   - holding home losartan in setting of MARTHA   - not currently on beta blocker  - TTE with some LVH but no apparent systolic or diastolic dysfunction  - diuresing with IV lasix 80  - received methylpred in ED, started prednisone 40 mg daily  - continue scheduled duo nebs   - strict I/Os   - continuous  telemetry   - UOP ~2.9L on 1/4/23  - I/O with -6.5L since admit  - continue diuresis   - Reduced methylpred to 20mg for remaining treatment days  - Cardiology consulted and feel that etiology is likely unapparent diastolic dysfunction not observed on formal echo combined with poorly controlled hypertension in the setting of asthma exacerbation; appreciate recs    MARTHA  - Cr 1.3 on admission, up from 0.7 yesterday   - holding home losartan   - urine studies with FEUrea 22.8% (pre-renal)--->likely cardiorenal etiology vs contraction alkalosis   - continue to monitor      Hyperglycemia   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  - glucose >500 on admit    - repeat A1C 11.6%  - no anion gap, no ketones in urine  - pro janie 0.06  - urine osms 515   - beta hydroxybutyrate wnl  - continue duloxetine for neuropathy   - Increased detemir to 20u BID     Hypertension   - normotensive on admit, pressures elevated during inpatient   - home meds include losartan+HCTZ  - will hold ARB in setting of MARTHA        Normocytic Anemia, unknown etiology   - hemoglobin 13.4, MCV 82   - asymptomatic   - iron studies with Fe 39, Transferrin 193, Sat Fe 14%  - PO iron supplementation outpatient     Hyperlipidemia  - continue home lipitor   - lipid panel with cholesterol 223, HDL 79 w/ lifetime cardiovascular risk calculated at 16.2%     HCM    -Sleep study outpatient if not on record for possible HERMILO  - Flu vax on discharge      Diet: Diet diabetic [facility]; 2800 Calorie; Cardiac (Low Na/Chol)   DVT Prophylaxis: LMWH  Code: Full Code     Dispo: pending improvement of volume status and glycemic control    Shashi Mclain M.D.  [facility] Neurology PGY-1    [facility] Medicine Hospitalist Pager numbers:   [facility] Hospitalist Medicine Team A (Alea/Alejandro): [phone]  [facility] Hospitalist Medicine Team B (Yamilka/Roseline):  [phone]

## 2023-01-05 NOTE — DISCHARGE SUMMARY
[facility] Medicine Discharge Summary    Primary Team: [facility] Hospitalist Team B  Attending Physician: Serafin Prather MD  Resident: Rehan  Intern: Chemo    Date of Admit: 1/2/2023  Date of Discharge: 1/6/2023    Discharge to: Home  Condition: Good    Discharge Diagnoses     Present on Admission:   SOB (shortness of breath)   Essential hypertension   Hyperlipidemia   Asthma exacerbation   Normocytic anemia   Volume overload   Hyperglycemia   MARTHA (acute kidney injury)   Wheezing      Consultants and Procedures     Consultants:  Consults (From admission, onward)          Status Ordering Provider     Inpatient consult to Cardiology-[facility]  Once        Provider:  Kit Sylvester MD    Completed KAI SANCHEZ             Procedures:   None    Brief History of Present Illness & Summary of Hospital Course     Tadeo Riley is a 57-year-old male with PMH of CHF with preserved ejection fraction, diabetes, hypertension, morbid obesity who presented to [facility] on 1/2/23 with shortness of breath for 2 days. Patient is being admitted to [facility] medicine for heart failure exacerbation and hyperglycemia. Diuresed well with IV lasix. Increased long acting insulin dosage for better glycemic control. Cardiology consulted and feel etiology likely unapparent diastolic dysfunction not observed on formal echo combined with poorly controlled hypertension in the setting of asthma exacerbation. Patient with elevated blood glucoses prior to discharge requiring additional night of stay with adjustment of insulin regimen. Glycemic control improved with new long acting dosage. Will need close follow up outpatient for PFTs, sleep study, and adjustment of hypertensive and diabetic regimen    On the day of discharge, patient was afebrile with stable vital signs and will be discharged in stable condition with close follow up.     For the full HPI please refer to the History & Physical from this admission.    Hospital Course By Problem  with Pertinent Findings       Shortness of breath  Acute on chronic HFpEF vs asthma exacerbation    - patient feeling short of breath after recent URI, audible wheezing, and increased swelling in his abdomen and upper back. Reports he has not been urinating as much on his home lasix.   - BNP <10  - trop 0.021, stable from yesterday   - TTE with some LVH but no apparent systolic or diastolic dysfunction  - received methylpred in ED, started prednisone 40 mg daily  - received scheduled duo nebs   - continuous telemetry   - I/O with -6.5L since admit  - Completed course of prednisone for asthma exacerbation  - Cardiology consulted and feel that etiology is likely unapparent diastolic dysfunction not observed on formal echo combined with poorly controlled hypertension in the setting of asthma exacerbation; appreciate recs  - resume home PO lasix and continue GDMT  - close follow up with cardiology outpatient     MARTHA  - Cr 1.3 on admission, up from 0.7 yesterday   - held home losartan   - urine studies with FEUrea 22.8% (pre-renal)--->likely cardiorenal etiology vs contraction alkalosis   - Okay to resume home medications on discharge       Hyperglycemia   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  - glucose >500 on admit    - repeat A1C 11.6%  - no findings of DKA  - duloxetine for neuropathy continued  - Aware of hyperglycemia on discharge, likely 2/2 steroids (now complete); patient to resume home insulin dosage/regimen on discharge       Hypertension   - normotensive on admit, pressures elevated during inpatient   - home meds include losartan+HCTZ  - held ARB in setting of MARTHA   - per cardiology recs, increased dosage of HCTZ 12.5--->25mg     Normocytic Anemia, unknown etiology   - hemoglobin 13.4, MCV 82   - asymptomatic   - iron studies with Fe 39, Transferrin 193, Sat Fe 14%  - PO iron supplementation outpatient     Hyperlipidemia  - continue home lipitor   - lipid panel with cholesterol 223, HDL 79 w/ lifetime  cardiovascular risk calculated at 16.2%     HCM     -Sleep study outpatient if not on record for possible HERMILO  - Flu vax on discharge        Discharge Medications        Medication List        START taking these medications      losartan-hydrochlorothiazide 100-25 mg 100-25 mg per tablet  Commonly known as: HYZAAR  Take 1 tablet by mouth once daily.  Replaces: losartan-hydrochlorothiazide 50-12.5 mg 50-12.5 mg per tablet            CHANGE how you take these medications      insulin NPH-insulin regular (70/30) 100 unit/mL (70-30) injection  Commonly known as: NovoLIN 70/30 U-100 Insulin  Inject 50 units twice a day by subcutaneous route.  What changed:   how much to take  how to take this  when to take this            CONTINUE taking these medications      albuterol 90 mcg/actuation inhaler  Commonly known as: PROVENTIL/VENTOLIN HFA  Inhale 2 puffs into the lungs every 6 (six) hours as needed for Wheezing. Rescue     aspirin 81 MG EC tablet  Commonly known as: ECOTRIN     DULoxetine 30 MG capsule  Commonly known as: CYMBALTA     furosemide 40 MG tablet  Commonly known as: LASIX     pravastatin 40 MG tablet  Commonly known as: PRAVACHOL     spironolactone 50 MG tablet  Commonly known as: ALDACTONE     tadalafiL 10 MG tablet  Commonly known as: CIALIS     vitamin D 1000 units Tab  Commonly known as: VITAMIN D3            STOP taking these medications      losartan 50 MG tablet  Commonly known as: COZAAR     losartan-hydrochlorothiazide 50-12.5 mg 50-12.5 mg per tablet  Commonly known as: HYZAAR  Replaced by: losartan-hydrochlorothiazide 100-25 mg 100-25 mg per tablet               Where to Get Your Medications        These medications were sent to [facility] [address] [place], [place] [address]  [address] [place] [id number]  [facility] [address]      Phone: [phone]   losartan-hydrochlorothiazide 100-25 mg 100-25 mg per tablet          Discharge Information:   Diet:  Diet diabetic [facility]; 2800 Calorie;  Cardiac (Low Na/Chol)  Diet Cardiac  Diet diabetic    Physical Activity:  As tolerated             Instructions:  1. Take all medications as prescribed  2. Keep all follow-up appointments  3. Return to the hospital or call your primary care physicians if any worsening symptoms such as fever, chest pain, shortness of breath, return of symptoms, or any other concerns.    Follow-Up Appointments:  PCP  Cardiology  Pulmonology  Dietetics       Shashi Mclain M.D.  [facility] Neurology PGY-1

## 2023-01-05 NOTE — PLAN OF CARE
Pt will dc with no needs noted. Pts wife will transport pt home. SW will continue to follow pt throughout his transitions of care and assist with any dc needs.     SW requested Nutrition Services, Pulm, and cardi follow up.       SW faxed referral to [facility] /Nutrition - Fax Referral to [phone].    SCHED CARDS at [facility] in [place] with Dr Holland Phillips on 1/27 at 10:00    Per scheduling Navigators : [facility] / Pulm.  Spoke with Pul staff, they will call patient to schedule.  I also left my number for them to contact me as well so we can chart.      Jan12 Go to PCP FOLLOW UP  Thursday Jan 12, 2023  Patient has PCP follow up at [facility] with Dr. Roque Lama on 1/12/23 at 10:15 am.       Cleared from CM . Bedside Nurse and VN notified.     01/05/23 1143   Final Note   Assessment Type Final Discharge Note   Anticipated Discharge Disposition Home   Hospital Resources/Appts/Education Provided Appointments scheduled by Navigator/Coordinator   Post-Acute Status   Discharge Delays None known at this time

## 2023-01-05 NOTE — PROGRESS NOTES
Looks comfortable off oxygen. Diuresed over 6 liters  Reviewed echo which shows LVH with diastolic dysfunction. Agree with your plans. Needs followup as outpatient
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
## 2023-01-05 NOTE — NURSING
Blood sugar as of 1649 > 500 via EPIC  Dr. Ivey, , and Niya Philip LPN notified.  Discharge orders canceled; Diabetic regimen to be adjusted tonight per Dr. Ivey

## 2023-01-05 NOTE — PLAN OF CARE
OT lui performed this date with PT, pt agreeable to therapy. Pt reports he is functioning at his baseline at this time. No c/o pain or weakness. Pt performing functional mobility in room with Dawson & no AD. Anticipate pt to d/c to home with no needs. No further acute OT needs at this time, d/c OT.    Problem: Occupational Therapy  Goal: Occupational Therapy Goal  Outcome: Met

## 2023-01-05 NOTE — PLAN OF CARE
Room air SpO2 = 98% . Patient with no SOB.  CRC will continue to follow.    Patient given nebulized treatment. Patient instructed on proper breathing technique, along with benefits of therapy.

## 2023-01-05 NOTE — NURSING
Discharge orders noted.   Last blood sugar 415 at 1140 AM  Per Dr. Mclain's note, patiently like to be discharged this afternoon with adjustment of diabetic regimen.  Niya Philip LPN, notified.   Dr. Parekh notified. Received new order to re-check blood sugar.   Will monitor.

## 2023-01-06 VITALS
DIASTOLIC BLOOD PRESSURE: 86 MMHG | TEMPERATURE: 98 F | WEIGHT: 308 LBS | HEIGHT: 67 IN | HEART RATE: 86 BPM | SYSTOLIC BLOOD PRESSURE: 137 MMHG | RESPIRATION RATE: 18 BRPM | OXYGEN SATURATION: 93 % | BODY MASS INDEX: 48.34 KG/M2

## 2023-01-06 LAB
ALBUMIN SERPL BCP-MCNC: 3.8 G/DL (ref 3.5–5.2)
ALP SERPL-CCNC: 97 U/L (ref 55–135)
ALT SERPL W/O P-5'-P-CCNC: 19 U/L (ref 10–44)
ANION GAP SERPL CALC-SCNC: 10 MMOL/L (ref 8–16)
AST SERPL-CCNC: 10 U/L (ref 10–40)
BASOPHILS # BLD AUTO: 0.02 K/UL (ref 0–0.2)
BASOPHILS NFR BLD: 0.2 % (ref 0–1.9)
BILIRUB SERPL-MCNC: 0.6 MG/DL (ref 0.1–1)
BUN SERPL-MCNC: 26 MG/DL (ref 6–20)
CALCIUM SERPL-MCNC: 9.9 MG/DL (ref 8.7–10.5)
CHLORIDE SERPL-SCNC: 94 MMOL/L (ref 95–110)
CO2 SERPL-SCNC: 30 MMOL/L (ref 23–29)
CREAT SERPL-MCNC: 1.2 MG/DL (ref 0.5–1.4)
DIFFERENTIAL METHOD: ABNORMAL
EOSINOPHIL # BLD AUTO: 0.2 K/UL (ref 0–0.5)
EOSINOPHIL NFR BLD: 1.4 % (ref 0–8)
ERYTHROCYTE [DISTWIDTH] IN BLOOD BY AUTOMATED COUNT: 13.4 % (ref 11.5–14.5)
EST. GFR  (NO RACE VARIABLE): >60 ML/MIN/1.73 M^2
GLUCOSE SERPL-MCNC: 321 MG/DL (ref 70–110)
HCT VFR BLD AUTO: 38.3 % (ref 40–54)
HGB BLD-MCNC: 13.9 G/DL (ref 14–18)
IMM GRANULOCYTES # BLD AUTO: 0.07 K/UL (ref 0–0.04)
IMM GRANULOCYTES NFR BLD AUTO: 0.6 % (ref 0–0.5)
LYMPHOCYTES # BLD AUTO: 3.5 K/UL (ref 1–4.8)
LYMPHOCYTES NFR BLD: 29.3 % (ref 18–48)
MAGNESIUM SERPL-MCNC: 2.3 MG/DL (ref 1.6–2.6)
MCH RBC QN AUTO: 28.7 PG (ref 27–31)
MCHC RBC AUTO-ENTMCNC: 36.3 G/DL (ref 32–36)
MCV RBC AUTO: 79 FL (ref 82–98)
MONOCYTES # BLD AUTO: 0.8 K/UL (ref 0.3–1)
MONOCYTES NFR BLD: 6.8 % (ref 4–15)
NEUTROPHILS # BLD AUTO: 7.4 K/UL (ref 1.8–7.7)
NEUTROPHILS NFR BLD: 61.7 % (ref 38–73)
NRBC BLD-RTO: 0 /100 WBC
PHOSPHATE SERPL-MCNC: 4 MG/DL (ref 2.7–4.5)
PLATELET # BLD AUTO: 298 K/UL (ref 150–450)
PMV BLD AUTO: 10.3 FL (ref 9.2–12.9)
POCT GLUCOSE: 301 MG/DL (ref 70–110)
POCT GLUCOSE: 438 MG/DL (ref 70–110)
POTASSIUM SERPL-SCNC: 4.1 MMOL/L (ref 3.5–5.1)
PROT SERPL-MCNC: 7.1 G/DL (ref 6–8.4)
RBC # BLD AUTO: 4.84 M/UL (ref 4.6–6.2)
SODIUM SERPL-SCNC: 134 MMOL/L (ref 136–145)
WBC # BLD AUTO: 11.94 K/UL (ref 3.9–12.7)

## 2023-01-06 PROCEDURE — 80053 COMPREHEN METABOLIC PANEL: CPT | Performed by: STUDENT IN AN ORGANIZED HEALTH CARE EDUCATION/TRAINING PROGRAM

## 2023-01-06 PROCEDURE — 25000242 PHARM REV CODE 250 ALT 637 W/ HCPCS: Performed by: STUDENT IN AN ORGANIZED HEALTH CARE EDUCATION/TRAINING PROGRAM

## 2023-01-06 PROCEDURE — 85025 COMPLETE CBC W/AUTO DIFF WBC: CPT | Performed by: STUDENT IN AN ORGANIZED HEALTH CARE EDUCATION/TRAINING PROGRAM

## 2023-01-06 PROCEDURE — 25000003 PHARM REV CODE 250

## 2023-01-06 PROCEDURE — 25000003 PHARM REV CODE 250: Performed by: STUDENT IN AN ORGANIZED HEALTH CARE EDUCATION/TRAINING PROGRAM

## 2023-01-06 PROCEDURE — 84100 ASSAY OF PHOSPHORUS: CPT | Performed by: STUDENT IN AN ORGANIZED HEALTH CARE EDUCATION/TRAINING PROGRAM

## 2023-01-06 PROCEDURE — 83735 ASSAY OF MAGNESIUM: CPT | Performed by: STUDENT IN AN ORGANIZED HEALTH CARE EDUCATION/TRAINING PROGRAM

## 2023-01-06 PROCEDURE — 94761 N-INVAS EAR/PLS OXIMETRY MLT: CPT

## 2023-01-06 PROCEDURE — 36415 COLL VENOUS BLD VENIPUNCTURE: CPT | Performed by: STUDENT IN AN ORGANIZED HEALTH CARE EDUCATION/TRAINING PROGRAM

## 2023-01-06 PROCEDURE — 63600175 PHARM REV CODE 636 W HCPCS

## 2023-01-06 PROCEDURE — 63600175 PHARM REV CODE 636 W HCPCS: Performed by: STUDENT IN AN ORGANIZED HEALTH CARE EDUCATION/TRAINING PROGRAM

## 2023-01-06 PROCEDURE — 94640 AIRWAY INHALATION TREATMENT: CPT

## 2023-01-06 RX ADMIN — PREDNISONE 20 MG: 20 TABLET ORAL at 08:01

## 2023-01-06 RX ADMIN — INSULIN ASPART 8 UNITS: 100 INJECTION, SOLUTION INTRAVENOUS; SUBCUTANEOUS at 05:01

## 2023-01-06 RX ADMIN — IPRATROPIUM BROMIDE AND ALBUTEROL SULFATE 3 ML: .5; 3 SOLUTION RESPIRATORY (INHALATION) at 07:01

## 2023-01-06 RX ADMIN — ATORVASTATIN CALCIUM 20 MG: 20 TABLET, FILM COATED ORAL at 08:01

## 2023-01-06 RX ADMIN — INSULIN ASPART 10 UNITS: 100 INJECTION, SOLUTION INTRAVENOUS; SUBCUTANEOUS at 11:01

## 2023-01-06 RX ADMIN — FUROSEMIDE 80 MG: 10 INJECTION, SOLUTION INTRAMUSCULAR; INTRAVENOUS at 08:01

## 2023-01-06 RX ADMIN — IPRATROPIUM BROMIDE AND ALBUTEROL SULFATE 3 ML: .5; 3 SOLUTION RESPIRATORY (INHALATION) at 11:01

## 2023-01-06 RX ADMIN — SPIRONOLACTONE 50 MG: 25 TABLET, FILM COATED ORAL at 08:01

## 2023-01-06 RX ADMIN — ASPIRIN 81 MG: 81 TABLET, COATED ORAL at 08:01

## 2023-01-06 NOTE — NURSING
Dr Parekh on unit floor. Informed of BG now 435 and scheduled levemir 30u and S/S aspart 5 units to be given now. No orders given. Will CTCM. Pt asymptomatic, denies any complaints.

## 2023-01-06 NOTE — PLAN OF CARE
Pt will dc with no needs noted. Pts wife will transport pt home. SW will continue to follow pt throughout his transitions of care and assist with any dc needs.      SW requested Nutrition Services, Pulm, and cardi follow up.         SW faxed referral to [facility] /Nutrition - Fax Referral to [phone].  SCHED CARDS at [facility] in [place] with Dr Holland Phillips on 1/27 at 10:00  Per scheduling Navigators : [facility] / Pulm.  Spoke with Pul staff, they will call patient to schedule.  I also left my number for them to contact me as well so we can chart.    Jan12 Go to PCP FOLLOW UP  Thursday Jan 12, 2023  Patient has PCP follow up at [facility] with Dr. Roque Lama on 1/12/23 at 10:15 am[address] [place] [address]   PH:  [phone]   Jan27 Go to Cardiology FOLLOW UP  Friday Jan 27, 2023  Patient has Cardiology follow up at [facility] in [place] with Dr. Holland Phillips on 1/27/23 at 10:00 am. [facility] [place] [facility] [address] [place] [address]   PH: [phone]        Cleared from  . Bedside Nurse and VN notified.     01/06/23 1018   Final Note   Assessment Type Final Discharge Note   Anticipated Discharge Disposition Home   Hospital Resources/Appts/Education Provided Appointments scheduled by Navigator/Coordinator   Post-Acute Status   Discharge Delays None known at this time

## 2023-01-06 NOTE — PROGRESS NOTES
[facility] Medicine Progress Note    Primary Team: [facility] Hospitalist Team B  Attending Physician: Serafin Prather MD  Resident: Rehan  Intern: [facility] Day: 2     Chief Complaint: SOB x 2-days   Subjective:      Interval: Patient with BG >500 x2, Determir dose increased.    This AM: Patient seen and examined by me this morning. Continues to feel like his back and abdominal swelling has improved, as well as his breathing. No additional complaints this AM.            Review of Systems   Constitutional:  Negative for chills and fever.   HENT:  Negative for congestion and sore throat.    Respiratory:  Positive for wheezing. Negative for cough and shortness of breath.    Cardiovascular:  Negative for chest pain, palpitations and leg swelling.        Tightness/edema/swelling of upper back     Gastrointestinal:  Negative for abdominal pain and vomiting.        Abdominal swelling   Genitourinary:  Negative for dysuria and frequency.   Musculoskeletal:  Negative for back pain and joint pain.   Skin:  Negative for itching and rash.   Neurological:  Negative for dizziness and headaches.   Psychiatric/Behavioral:  The patient is not nervous/anxious and does not have insomnia.    All other systems reviewed and are negative.       Past Medical History:   Diagnosis Date    CHF (congestive heart failure)     Diabetes mellitus     Hypertension     Obesity               Objective:   Last 24 Hour Vital Signs:  BP  Min: 128/63  Max: 168/92  Temp  Av.9 °F (36.6 °C)  Min: 97.4 °F (36.3 °C)  Max: 98.8 °F (37.1 °C)  Pulse  Av  Min: 78  Max: 101  Resp  Av.8  Min: 16  Max: 20  SpO2  Av.3 %  Min: 90 %  Max: 99 %    Intake/Output Summary (Last 24 hours) at 2023 0757  Last data filed at 2023 0654  Gross per 24 hour   Intake 360 ml   Output 3150 ml   Net -2790 ml        Physical Examination:  Physical Exam  Constitutional:       Appearance: He is obese.   HENT:      Head: Normocephalic.      Right Ear:  External ear normal.      Left Ear: External ear normal.      Nose: Nose normal.      Mouth/Throat:      Mouth: Mucous membranes are moist.      Pharynx: Oropharynx is clear.   Eyes:      Extraocular Movements: Extraocular movements intact.      Pupils: Pupils are equal, round, and reactive to light.   Cardiovascular:      Rate and Rhythm: Normal rate and regular rhythm.      Pulses: Normal pulses.      Heart sounds: Normal heart sounds.      Comments: Pitting edema present at upper back improved today  Pulmonary:      Effort: Pulmonary effort is normal.      Breath sounds: Wheezing present. No rales.      Comments: end expiratory wheezing improving  Abdominal:      General: Bowel sounds are normal. There is distension.      Tenderness: There is no abdominal tenderness. There is no guarding.   Musculoskeletal:      Right lower leg: No edema.      Left lower leg: No edema.   Skin:     General: Skin is warm and dry.      Capillary Refill: Capillary refill takes less than 2 seconds.      Findings: No erythema or rash.   Neurological:      General: No focal deficit present.      Mental Status: He is alert. Mental status is at baseline.   Psychiatric:         Mood and Affect: Mood normal.         Behavior: Behavior normal.      Laboratory:  Recent Labs   Lab 01/02/23  1554 01/02/23  1654 01/03/23  0554 01/04/23  0406 01/05/23  0351 01/06/23  0450   WBC  --  8.49 11.96 12.67 11.62 11.94   HGB  --  13.4* 14.0 13.0* 13.0* 13.9*   HCT  --  38.4* 40.0 36.6* 37.1* 38.3*   PLT  --  279 315 286 276 298   MCV  --  82 81* 79* 81* 79*   RDW  --  13.9 13.5 13.7 13.4 13.4   *  --  134* 133* 133* 134*   K 4.4  --  4.8 3.9 3.8 4.1   CL 92*  --  94* 93* 93* 94*   CO2 28  --  28 31* 28 30*   BUN 13  --  21* 31* 28* 26*   CREATININE 1.3  --  1.2 1.3 1.2 1.2   *  --  407* 405* 374* 321*   PROT 7.7  --  7.4 7.1 7.1 7.1   ALBUMIN 3.8  --  3.6 3.5 3.6 3.8   BILITOT 0.4  --  0.5 0.5 0.5 0.6   AST 14  --  13 11 10 10   ALKPHOS 103   --  98 86 92 97   ALT 20  --  21 14 15 19         Urinalysis  No results for input(s): COLORU, CLARITYU, SPECGRAV, PHUR, PROTEINUA, GLUCOSEU, BILIRUBINCON, BLOODU, WBCU, RBCU, BACTERIA, MUCUS, NITRITE, LEUKOCYTESUR, UROBILINOGEN, HYALINECASTS in the last 24 hours.    Microbiology Results (last 7 days)       Procedure Component Value Units Date/Time    Influenza A & B by Molecular [[id number]] Collected: 01/02/23 1535    Order Status: Completed Specimen: Nasopharyngeal Swab Updated: 01/02/23 1557     Influenza A, Molecular Negative     Influenza B, Molecular Negative     Flu A & B Source Nasal swab             I have personally reviewed the above laboratory studies.     Imaging:  EKG (my interpretation): No new today    CTA Chest Non-Coronary (PE Studies)   Final Result      There is no large central saddle type pulmonary embolus, sensitivity of the examination is diminished due to diminished opacification and artifactual heterogeneity of the pulmonary arterial vascular, mid size and small vessel pulmonary emboli cannot be excluded, however on close evaluation the pattern is consistent with artifact and an abnormal pattern of pulmonary arterial filling defects specific for pulmonary emboli is not seen.  Close clinical and historical correlation is otherwise needed to determine need for additional imaging or therapy.      Suspected small thyroid nodules, most prominent measures up to approximately 10 mm.      Additional findings as above.         Electronically signed by: Stef Zendejas   Date:    01/04/2023   Time:    21:21      US Abdomen Limited   Final Result      X-Ray Chest AP Portable   Final Result      1. Stable cardiomegaly.  Hypoventilatory lung changes.         Electronically signed by: Franklyn Haddad MD   Date:    01/02/2023   Time:    16:06          Current Medications:     Scheduled:   albuterol-ipratropium  3 mL Nebulization Q4H WAKE    aspirin  81 mg Oral Daily    atorvastatin  20 mg Oral Daily     DULoxetine  30 mg Oral QHS    enoxaparin  40 mg Subcutaneous Daily    furosemide (LASIX) injection  80 mg Intravenous Daily    insulin detemir U-100  30 Units Subcutaneous BID    LIDOcaine  2 patch Transdermal Q24H    predniSONE  20 mg Oral Daily    spironolactone  50 mg Oral Daily         Infusions:       PRN:  benzonatate, dextrose 10%, dextrose 10%, glucagon (human recombinant), glucose, glucose, influenza, insulin aspart U-100, naloxone, sodium chloride 0.9%  Antibiotics and Day Number of Therapy:  Antibiotics (From admission, onward)      None               Assessment:     Tadeo Riley is a 57-year-old male with PMH of CHF with preserved ejection fraction, diabetes, hypertension, morbid obesity who presented to [facility] on 1/2/23 with shortness of breath for 2 days. Patient is being admitted to [facility] medicine for heart failure exacerbation and hyperglycemia. Currently diuresing well with IV lasix. Increased long acting insulin dosage for better glycemic control. Cardiology consulted and feel etiology likely unapparent diastolic dysfunction not observed on formal echo combined with poorly controlled hypertension in the setting of asthma exacerbation. Patient with elevated blood glucoses yesterday prior to discharge requiring continued stay and overnight adjustment of insulin regimen. Glycemic control improved today. Likely discharge this afternoon with close follow up outpatient for PFTs, sleep study, and adjustment of hypertensive and diabetic regimen    Plan:     Shortness of breath  Acute on chronic HFpEF vs asthma exacerbation    - patient feeling short of breath after recent URI, audible wheezing, and increased swelling in his abdomen and upper back. Reports he has not been urinating as much on his home lasix.   - BNP <10  - satting 92-94% on room air   - trop 0.021, stable from yesterday   - holding home losartan in setting of MARTHA   - not currently on beta blocker  - TTE with some LVH but no apparent  systolic or diastolic dysfunction  - diuresing with IV lasix 80  - received methylpred in ED, started prednisone 40 mg daily  - continue scheduled duo nebs   - strict I/Os   - continuous telemetry   - UOP ~2.9L on 1/4/23  - I/O with -6.5L since admit  - continue diuresis   - Reduced methylpred to 20mg for remaining treatment days  - Cardiology consulted and feel that etiology is likely unapparent diastolic dysfunction not observed on formal echo combined with poorly controlled hypertension in the setting of asthma exacerbation; appreciate recs    MARTHA  - Cr 1.3 on admission, up from 0.7 yesterday   - holding home losartan   - urine studies with FEUrea 22.8% (pre-renal)--->likely cardiorenal etiology vs contraction alkalosis   - continue to monitor      Hyperglycemia   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  - glucose >500 on admit    - repeat A1C 11.6%  - no anion gap, no ketones in urine  - pro janie 0.06  - urine osms 515   - beta hydroxybutyrate wnl  - continue duloxetine for neuropathy   - Increased detemir to 20u BID on 1/4/22  - Blood sugars >500 x2 on 1/5/22  - Detemir increased from 20u BID--->30uBID on 1/5/22     Hypertension   - normotensive on admit, pressures elevated during inpatient   - home meds include losartan+HCTZ  - will hold ARB in setting of MARTHA        Normocytic Anemia, unknown etiology   - hemoglobin 13.4, MCV 82   - asymptomatic   - iron studies with Fe 39, Transferrin 193, Sat Fe 14%  - PO iron supplementation outpatient     Hyperlipidemia  - continue home lipitor   - lipid panel with cholesterol 223, HDL 79 w/ lifetime cardiovascular risk calculated at 16.2%     HCM    -Sleep study outpatient if not on record for possible HERMILO  - Flu vax on discharge      Diet: Diet diabetic [facility]; 2800 Calorie; Cardiac (Low Na/Chol)  Diet Cardiac  Diet diabetic   DVT Prophylaxis: LMWH  Code: Full Code     Dispo: pending improvement of volume status and glycemic control    Shashi Mclain,  M.D.  [facility] Neurology PGY-1    [facility] Medicine Hospitalist Pager numbers:   [facility] Hospitalist Medicine Team A (Alea/Alejandro): [phone]  [facility] Hospitalist Medicine Team B (Yamilka/Roseline):  [phone]

## 2023-01-06 NOTE — NURSING
Last blood sugar 438. Dr. Van notified.  Ok for patient to be discharged per MD  Per Dr. Van, blood sugars are elevated due to steroids, and patient's home dose of insulin has been increased.   AVS reviewed per Lien bedside nurse.  Reporting off.

## 2023-01-06 NOTE — PLAN OF CARE
Problem: Adult Inpatient Plan of Care  Goal: Plan of Care Review  Outcome: Ongoing, Progressing  Chart check complete. Care plan and education updated. Will monitor.

## 2023-04-03 PROBLEM — N17.9 AKI (ACUTE KIDNEY INJURY): Status: RESOLVED | Noted: 2018-05-08 | Resolved: 2023-04-03

## 2023-04-27 DIAGNOSIS — M16.12 UNILATERAL PRIMARY OSTEOARTHRITIS, LEFT HIP: Primary | ICD-10-CM

## 2023-05-08 DIAGNOSIS — M16.12 PRIMARY OSTEOARTHRITIS OF LEFT HIP: Primary | ICD-10-CM

## 2023-09-11 ENCOUNTER — OFFICE VISIT (OUTPATIENT)
Dept: ORTHOPEDICS | Facility: CLINIC | Age: 58
End: 2023-09-11
Payer: MEDICARE

## 2023-09-11 VITALS
WEIGHT: 315 LBS | HEIGHT: 67 IN | HEART RATE: 101 BPM | SYSTOLIC BLOOD PRESSURE: 140 MMHG | DIASTOLIC BLOOD PRESSURE: 84 MMHG | BODY MASS INDEX: 49.44 KG/M2

## 2023-09-11 DIAGNOSIS — M16.12 PRIMARY OSTEOARTHRITIS OF LEFT HIP: Primary | ICD-10-CM

## 2023-09-11 DIAGNOSIS — E66.01 MORBID OBESITY DUE TO EXCESS CALORIES: ICD-10-CM

## 2023-09-11 PROCEDURE — 3046F PR MOST RECENT HEMOGLOBIN A1C LEVEL > 9.0%: ICD-10-PCS | Mod: CPTII,S$GLB,, | Performed by: PHYSICIAN ASSISTANT

## 2023-09-11 PROCEDURE — 1160F PR REVIEW ALL MEDS BY PRESCRIBER/CLIN PHARMACIST DOCUMENTED: ICD-10-PCS | Mod: CPTII,S$GLB,, | Performed by: PHYSICIAN ASSISTANT

## 2023-09-11 PROCEDURE — 4010F ACE/ARB THERAPY RXD/TAKEN: CPT | Mod: CPTII,S$GLB,, | Performed by: PHYSICIAN ASSISTANT

## 2023-09-11 PROCEDURE — 3077F PR MOST RECENT SYSTOLIC BLOOD PRESSURE >= 140 MM HG: ICD-10-PCS | Mod: CPTII,S$GLB,, | Performed by: PHYSICIAN ASSISTANT

## 2023-09-11 PROCEDURE — 4010F PR ACE/ARB THEARPY RXD/TAKEN: ICD-10-PCS | Mod: CPTII,S$GLB,, | Performed by: PHYSICIAN ASSISTANT

## 2023-09-11 PROCEDURE — 3079F DIAST BP 80-89 MM HG: CPT | Mod: CPTII,S$GLB,, | Performed by: PHYSICIAN ASSISTANT

## 2023-09-11 PROCEDURE — 99999 PR PBB SHADOW E&M-EST. PATIENT-LVL IV: CPT | Mod: PBBFAC,,, | Performed by: PHYSICIAN ASSISTANT

## 2023-09-11 PROCEDURE — 3077F SYST BP >= 140 MM HG: CPT | Mod: CPTII,S$GLB,, | Performed by: PHYSICIAN ASSISTANT

## 2023-09-11 PROCEDURE — 3008F BODY MASS INDEX DOCD: CPT | Mod: CPTII,S$GLB,, | Performed by: PHYSICIAN ASSISTANT

## 2023-09-11 PROCEDURE — 99203 OFFICE O/P NEW LOW 30 MIN: CPT | Mod: S$GLB,,, | Performed by: PHYSICIAN ASSISTANT

## 2023-09-11 PROCEDURE — 3008F PR BODY MASS INDEX (BMI) DOCUMENTED: ICD-10-PCS | Mod: CPTII,S$GLB,, | Performed by: PHYSICIAN ASSISTANT

## 2023-09-11 PROCEDURE — 99999 PR PBB SHADOW E&M-EST. PATIENT-LVL IV: ICD-10-PCS | Mod: PBBFAC,,, | Performed by: PHYSICIAN ASSISTANT

## 2023-09-11 PROCEDURE — 1159F PR MEDICATION LIST DOCUMENTED IN MEDICAL RECORD: ICD-10-PCS | Mod: CPTII,S$GLB,, | Performed by: PHYSICIAN ASSISTANT

## 2023-09-11 PROCEDURE — 1160F RVW MEDS BY RX/DR IN RCRD: CPT | Mod: CPTII,S$GLB,, | Performed by: PHYSICIAN ASSISTANT

## 2023-09-11 PROCEDURE — 3046F HEMOGLOBIN A1C LEVEL >9.0%: CPT | Mod: CPTII,S$GLB,, | Performed by: PHYSICIAN ASSISTANT

## 2023-09-11 PROCEDURE — 1159F MED LIST DOCD IN RCRD: CPT | Mod: CPTII,S$GLB,, | Performed by: PHYSICIAN ASSISTANT

## 2023-09-11 PROCEDURE — 99203 PR OFFICE/OUTPT VISIT, NEW, LEVL III, 30-44 MIN: ICD-10-PCS | Mod: S$GLB,,, | Performed by: PHYSICIAN ASSISTANT

## 2023-09-11 PROCEDURE — 3079F PR MOST RECENT DIASTOLIC BLOOD PRESSURE 80-89 MM HG: ICD-10-PCS | Mod: CPTII,S$GLB,, | Performed by: PHYSICIAN ASSISTANT

## 2023-09-11 RX ORDER — IBUPROFEN 800 MG/1
800 TABLET ORAL 3 TIMES DAILY
COMMUNITY
End: 2023-12-07

## 2023-09-11 RX ORDER — MELOXICAM 15 MG/1
15 TABLET ORAL DAILY
COMMUNITY
End: 2023-12-07

## 2023-09-11 NOTE — PROGRESS NOTES
Subjective:      Patient ID: Tadeo Riley is a 57 y.o. male.    Chief Complaint: Pain of the Left Hip      57-year-old morbidly obese male presents with prolonged history of left hip pain, worsening over the last 2 months.  His pain is in his groin.  Symptoms are worse with ambulation and with range of motion.  He notes decreased range of motion in his hip.  He denies instability and paresthesia but does note back pain.  He is taking ibuprofen and meloxicam for pain.  He was referred to physical therapy but did not go due to insurance issues.        Review of Systems   Constitutional: Negative for chills and fever.   Cardiovascular:  Negative for chest pain.   Respiratory:  Negative for cough.    Hematologic/Lymphatic: Does not bruise/bleed easily.   Skin:  Negative for poor wound healing and rash.   Musculoskeletal:  Positive for joint pain, myalgias and stiffness.   Gastrointestinal:  Negative for abdominal pain.   Genitourinary:  Negative for bladder incontinence.   Neurological:  Negative for dizziness, loss of balance and weakness.   Psychiatric/Behavioral:  Negative for altered mental status.        Review of patient's allergies indicates:   Allergen Reactions    Metformin Hives    Lisinopril Other (See Comments)    Benadryl [diphenhydramine hcl] Rash        Current Outpatient Medications   Medication Sig Dispense Refill    albuterol (PROVENTIL/VENTOLIN HFA) 90 mcg/actuation inhaler Inhale 2 puffs into the lungs every 6 (six) hours as needed for Wheezing. Rescue 18 g 6    aspirin (ECOTRIN) 81 MG EC tablet Take 81 mg by mouth once daily.      DULoxetine (CYMBALTA) 30 MG capsule Take 30 mg by mouth nightly.      furosemide (LASIX) 40 MG tablet Take 40 mg by mouth once daily.      ibuprofen (ADVIL,MOTRIN) 800 MG tablet Take 800 mg by mouth 3 (three) times daily.      insulin NPH-insulin regular, 70/30, (NOVOLIN 70/30 U-100 INSULIN) 100 unit/mL (70-30) injection Inject 50 units twice a day by subcutaneous route.  "(Patient taking differently: Inject 50 Units into the skin 2 (two) times daily. Inject 50 units twice a day by subcutaneous route.) 30 mL 11    losartan-hydrochlorothiazide 100-25 mg (HYZAAR) 100-25 mg per tablet Take 1 tablet by mouth once daily. 90 tablet 3    meloxicam (MOBIC) 15 MG tablet Take 15 mg by mouth once daily.      pravastatin (PRAVACHOL) 40 MG tablet Take 40 mg by mouth once daily.      spironolactone (ALDACTONE) 50 MG tablet Take 50 mg by mouth once daily.      tadalafiL (CIALIS) 10 MG tablet Take 10 mg by mouth daily as needed.      vitamin D (VITAMIN D3) 1000 units Tab Take by mouth.       No current facility-administered medications for this visit.        The patient's relevant past medical, surgical, and social history was reviewed in Epic.       Objective:      VITAL SIGNS: BP (!) 140/84   Pulse 101   Ht 5' 7" (1.702 m)   Wt (!) 152.5 kg (336 lb 3.2 oz)   BMI 52.66 kg/m²     General    Nursing note and vitals reviewed.  Constitutional: He is oriented to person, place, and time. He appears well-developed.   Morbidly obese    Neurological: He is alert and oriented to person, place, and time.     General Musculoskeletal Exam   Gait: antalgic     Left Hip Exam     Range of Motion   Abduction:  15   Extension:  10   Flexion:  10   External rotation:  0   Internal rotation: 5     Tests   Stinchfield test: positive    Other   Sensation: normal    Comments:  Limited ROM . Pain with active flexion.           Muscle Strength   Left Lower Extremity   Hip Abduction: 3/5   Hip Flexion: 3/5   Ankle Dorsiflexion:  5/5      Recent left hip x-rays reveal severe degenerative changes of the left femoral acetabular joint.  Bone-on-bone appearance.  Also severe right  hip posttraumatic degenerative changes.      Assessment:       1. Primary osteoarthritis of left hip    2. Morbid obesity due to excess calories          Plan:         Tadeo was seen today for pain.    Diagnoses and all orders for this " visit:    Primary osteoarthritis of left hip  -     IR Aspiration Injection Large Joint W FL (xpd); Future  -     Ambulatory referral/consult to Interventional RAD; Future    Morbid obesity due to excess calories  -     IR Aspiration Injection Large Joint W FL (xpd); Future  -     Ambulatory referral/consult to Interventional RAD; Future    Left hip bone-on-bone osteoarthritis.  Discussed the options with the patient which include oral medication, physical therapy, intra-articular hip injections and total hip arthroplasty.  I think patient would benefit from a total hip arthroplasty but he needs to lose about 40-50 lb first.  I will then set him up with a left hip intra-articular injection with Interventional Radiology to provide pain relief so he is able to lose the weight.  Dr. Holguin his card given so patient can make an appointment with him once weight is lost.  I would continue meloxicam as needed for pain.    Diagnoses and plan discussed with the patient, as well as the expected course and duration of his symptoms.  All questions and concerns were addressed prior to the end of the visit.   Instructed patient to call office if they have any future questions/concerns or to schedule apt. Patient will return to see me if symptoms worsen or fail to improve    Note dictated with voice recognition software, please excuse any grammatical errors.        Sharmaine José PA-C   09/11/2023

## 2023-09-18 ENCOUNTER — TELEPHONE (OUTPATIENT)
Dept: ORTHOPEDICS | Facility: CLINIC | Age: 58
End: 2023-09-18
Payer: MEDICARE

## 2023-09-18 NOTE — TELEPHONE ENCOUNTER
Left message with [phone] to call Mr. Riley and schedule appointment.  Spoke with patient. Explained that IR will be contacting him. Unable to take down phone to call them.  Informed him to call back if they do not get in touch.

## 2023-09-18 NOTE — TELEPHONE ENCOUNTER
Spoke with Mr. Riley, appointment has been scheduled with MD. However, prior to ended call- pt wanted to confirmed if he will be receiving injection as was stated at his last appointment. Patient  was then informed that would be a different dept. As a referral has been place to IR by Sharmaine José. He was informed this is an appointment with the joint specialist. A message will be forward to Sharmaine José re: IR, as patient stated he hasn't been contacted by them and was told to reach back out to clinic.

## 2023-09-18 NOTE — TELEPHONE ENCOUNTER
----- Message from Sohail Jasmine sent at 9/18/2023 10:09 AM CDT -----  Contact: Pt   Type:  Sooner Apoointment Request    Caller is requesting a sooner appointment.  Caller declined first available appointment listed below.  Caller will not accept being placed on the waitlist and is requesting a message be sent to doctor.  Name of Caller:pt. Wife   When is the first available appointment?Nov  Symptoms: osteoarthritis of left hip joint  Would the patient rather a call back or a response via [facility]?  Call back  Best Call Back Number: [phone]  Additional Information: pt. Saw Dr. José  09/11/2023 and was told to f/u  with Dr. Holguin prefer [place] location.

## 2023-09-21 ENCOUNTER — TELEPHONE (OUTPATIENT)
Dept: INTERVENTIONAL RADIOLOGY/VASCULAR | Facility: CLINIC | Age: 58
End: 2023-09-21
Payer: MEDICARE

## 2023-09-21 NOTE — TELEPHONE ENCOUNTER
Spoke to pt on phone, Pt is scheduled on 9/29 for IR procedure at [facility]. Pt aware and confirmed, Thanks

## 2023-09-25 DIAGNOSIS — M25.552 DISCOMFORT OF LEFT HIP: ICD-10-CM

## 2023-09-25 DIAGNOSIS — M16.12 PRIMARY OSTEOARTHRITIS OF LEFT HIP: Primary | ICD-10-CM

## 2023-09-29 ENCOUNTER — TELEPHONE (OUTPATIENT)
Dept: INTERVENTIONAL RADIOLOGY/VASCULAR | Facility: HOSPITAL | Age: 58
End: 2023-09-29

## 2023-09-29 NOTE — NURSING
Patient advised to arrive at 10:30, where to check in, no need to fast, may drive self, take meds as usual, bring current list of home meds. Three allergies confirmed; last 81mg aspirin 9/29/2023.

## 2023-10-02 ENCOUNTER — HOSPITAL ENCOUNTER (OUTPATIENT)
Dept: INTERVENTIONAL RADIOLOGY/VASCULAR | Facility: HOSPITAL | Age: 58
Discharge: HOME | End: 2023-10-02
Attending: PHYSICIAN ASSISTANT
Payer: MEDICARE

## 2023-10-02 VITALS
HEART RATE: 95 BPM | BODY MASS INDEX: 49.44 KG/M2 | SYSTOLIC BLOOD PRESSURE: 148 MMHG | HEIGHT: 67 IN | DIASTOLIC BLOOD PRESSURE: 89 MMHG | RESPIRATION RATE: 20 BRPM | OXYGEN SATURATION: 97 % | TEMPERATURE: 97 F | WEIGHT: 315 LBS

## 2023-10-02 DIAGNOSIS — M16.12 PRIMARY OSTEOARTHRITIS OF LEFT HIP: ICD-10-CM

## 2023-10-02 DIAGNOSIS — E66.01 MORBID OBESITY DUE TO EXCESS CALORIES: ICD-10-CM

## 2023-10-02 PROCEDURE — A4215 STERILE NEEDLE: HCPCS

## 2023-10-02 PROCEDURE — 20610 IR ASPIRATION INJECTION LARGE JOINT W FLUORO: ICD-10-PCS | Mod: LT,,, | Performed by: STUDENT IN AN ORGANIZED HEALTH CARE EDUCATION/TRAINING PROGRAM

## 2023-10-02 PROCEDURE — 20610 DRAIN/INJ JOINT/BURSA W/O US: CPT | Mod: LT,,, | Performed by: STUDENT IN AN ORGANIZED HEALTH CARE EDUCATION/TRAINING PROGRAM

## 2023-10-02 PROCEDURE — 20610 DRAIN/INJ JOINT/BURSA W/O US: CPT | Mod: LT

## 2023-10-02 PROCEDURE — 77002 NEEDLE LOCALIZATION BY XRAY: CPT

## 2023-10-02 PROCEDURE — 77002 IR ASPIRATION INJECTION LARGE JOINT W FLUORO: ICD-10-PCS | Mod: 26,,, | Performed by: STUDENT IN AN ORGANIZED HEALTH CARE EDUCATION/TRAINING PROGRAM

## 2023-10-02 PROCEDURE — 27200940 IR ASPIRATION INJECTION LARGE JOINT W FLUORO

## 2023-10-02 PROCEDURE — 77002 NEEDLE LOCALIZATION BY XRAY: CPT | Mod: 26,,, | Performed by: STUDENT IN AN ORGANIZED HEALTH CARE EDUCATION/TRAINING PROGRAM

## 2023-10-02 PROCEDURE — 63600175 PHARM REV CODE 636 W HCPCS: Performed by: STUDENT IN AN ORGANIZED HEALTH CARE EDUCATION/TRAINING PROGRAM

## 2023-10-02 RX ORDER — TRIAMCINOLONE ACETONIDE 40 MG/ML
INJECTION, SUSPENSION INTRA-ARTICULAR; INTRAMUSCULAR
Status: COMPLETED | OUTPATIENT
Start: 2023-10-02 | End: 2023-10-02

## 2023-10-02 RX ADMIN — TRIAMCINOLONE ACETONIDE 40 MG: 40 INJECTION, SUSPENSION INTRA-ARTICULAR; INTRAMUSCULAR at 11:10

## 2023-10-02 NOTE — NURSING
Patient returned from procedure to IR recovery and cleared for discharge home; Patient is AAO and no pain reported. Belongings at bedside.

## 2023-10-02 NOTE — NURSING
Patient escorted to pov to spouse at discharge in no distress; Patient has discjharge instructions and questions answered and verbalized understanding

## 2023-10-02 NOTE — BRIEF OP NOTE
Radiology Post-Procedure Note    Pre Op Diagnosis: left hip pain    Post Op Diagnosis: same    Procedure: left hip injection     Procedure performed by: Waleska Sanabria MD    Written Informed Consent Obtained: Yes    Specimen Removed: NO    Estimated Blood Loss: Minimal    Findings:   Using fluoroscopic guidance a 21g sheath needle was placed into left hip joint. Hip injection complete.     Patient tolerated procedure well.    Waleska Sanabria MD  Interventional Radiology
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
## 2023-10-31 ENCOUNTER — TELEPHONE (OUTPATIENT)
Dept: ORTHOPEDICS | Facility: CLINIC | Age: 58
End: 2023-10-31
Payer: MEDICARE

## 2023-10-31 DIAGNOSIS — M51.36 DDD (DEGENERATIVE DISC DISEASE), LUMBAR: Primary | ICD-10-CM

## 2023-10-31 NOTE — TELEPHONE ENCOUNTER
Spoke to patient regarding x-ray. Patient is aware of x-ray scheduled for 7:00 am at the [facility] on 11/06/2023. Patient stated thank you. Thanks.

## 2023-11-06 ENCOUNTER — HOSPITAL ENCOUNTER (OUTPATIENT)
Dept: RADIOLOGY | Facility: HOSPITAL | Age: 58
Discharge: HOME | End: 2023-11-06
Attending: REGISTERED NURSE
Payer: MEDICARE

## 2023-11-06 ENCOUNTER — OFFICE VISIT (OUTPATIENT)
Dept: ORTHOPEDICS | Facility: CLINIC | Age: 58
End: 2023-11-06
Payer: MEDICARE

## 2023-11-06 VITALS — HEIGHT: 70 IN | BODY MASS INDEX: 45.1 KG/M2 | WEIGHT: 315 LBS

## 2023-11-06 DIAGNOSIS — M54.9 DORSALGIA, UNSPECIFIED: Primary | ICD-10-CM

## 2023-11-06 DIAGNOSIS — M51.36 DDD (DEGENERATIVE DISC DISEASE), LUMBAR: ICD-10-CM

## 2023-11-06 PROCEDURE — 99214 OFFICE O/P EST MOD 30 MIN: CPT | Mod: S$GLB,,, | Performed by: REGISTERED NURSE

## 2023-11-06 PROCEDURE — 99999 PR PBB SHADOW E&M-EST. PATIENT-LVL III: ICD-10-PCS | Mod: PBBFAC,,, | Performed by: REGISTERED NURSE

## 2023-11-06 PROCEDURE — 72110 X-RAY EXAM L-2 SPINE 4/>VWS: CPT | Mod: TC

## 2023-11-06 PROCEDURE — 72110 XR LUMBAR SPINE AP AND LAT WITH FLEX/EXT: ICD-10-PCS | Mod: 26,,, | Performed by: RADIOLOGY

## 2023-11-06 PROCEDURE — 1159F MED LIST DOCD IN RCRD: CPT | Mod: CPTII,S$GLB,, | Performed by: REGISTERED NURSE

## 2023-11-06 PROCEDURE — 99214 PR OFFICE/OUTPT VISIT, EST, LEVL IV, 30-39 MIN: ICD-10-PCS | Mod: S$GLB,,, | Performed by: REGISTERED NURSE

## 2023-11-06 PROCEDURE — 4010F ACE/ARB THERAPY RXD/TAKEN: CPT | Mod: CPTII,S$GLB,, | Performed by: REGISTERED NURSE

## 2023-11-06 PROCEDURE — 3008F BODY MASS INDEX DOCD: CPT | Mod: CPTII,S$GLB,, | Performed by: REGISTERED NURSE

## 2023-11-06 PROCEDURE — 99999 PR PBB SHADOW E&M-EST. PATIENT-LVL III: CPT | Mod: PBBFAC,,, | Performed by: REGISTERED NURSE

## 2023-11-06 PROCEDURE — 4010F PR ACE/ARB THEARPY RXD/TAKEN: ICD-10-PCS | Mod: CPTII,S$GLB,, | Performed by: REGISTERED NURSE

## 2023-11-06 PROCEDURE — 72110 X-RAY EXAM L-2 SPINE 4/>VWS: CPT | Mod: 26,,, | Performed by: RADIOLOGY

## 2023-11-06 PROCEDURE — 1159F PR MEDICATION LIST DOCUMENTED IN MEDICAL RECORD: ICD-10-PCS | Mod: CPTII,S$GLB,, | Performed by: REGISTERED NURSE

## 2023-11-06 PROCEDURE — 3008F PR BODY MASS INDEX (BMI) DOCUMENTED: ICD-10-PCS | Mod: CPTII,S$GLB,, | Performed by: REGISTERED NURSE

## 2023-11-06 PROCEDURE — 3046F HEMOGLOBIN A1C LEVEL >9.0%: CPT | Mod: CPTII,S$GLB,, | Performed by: REGISTERED NURSE

## 2023-11-06 PROCEDURE — 3046F PR MOST RECENT HEMOGLOBIN A1C LEVEL > 9.0%: ICD-10-PCS | Mod: CPTII,S$GLB,, | Performed by: REGISTERED NURSE

## 2023-11-06 RX ORDER — CYCLOBENZAPRINE HCL 10 MG
10 TABLET ORAL 3 TIMES DAILY PRN
Qty: 60 TABLET | Refills: 2 | Status: SHIPPED | OUTPATIENT
Start: 2023-11-06 | End: 2024-01-05

## 2023-11-06 RX ORDER — NAPROXEN 500 MG/1
500 TABLET ORAL 2 TIMES DAILY PRN
Qty: 90 TABLET | Refills: 0 | OUTPATIENT
Start: 2023-11-06 | End: 2023-12-07

## 2023-11-06 RX ORDER — GABAPENTIN 300 MG/1
300-600 CAPSULE ORAL NIGHTLY
Qty: 60 CAPSULE | Refills: 11 | Status: SHIPPED | OUTPATIENT
Start: 2023-11-06 | End: 2024-11-05

## 2023-11-27 ENCOUNTER — HOSPITAL ENCOUNTER (OUTPATIENT)
Dept: RADIOLOGY | Facility: HOSPITAL | Age: 58
Discharge: HOME | End: 2023-11-27
Attending: REGISTERED NURSE
Payer: MEDICARE

## 2023-11-27 DIAGNOSIS — M54.9 DORSALGIA, UNSPECIFIED: ICD-10-CM

## 2023-11-27 DIAGNOSIS — M51.36 DDD (DEGENERATIVE DISC DISEASE), LUMBAR: ICD-10-CM

## 2023-11-27 NOTE — PROGRESS NOTES
Established Patient - Audio Only Telehealth Visit     The patient location is: home  The chief complaint leading to consultation is: MRI results  Visit type: Virtual visit with audio only (telephone)  Total time spent with patient: 0min     The reason for the audio only service rather than synchronous audio and video virtual visit was related to technical difficulties or patient preference/necessity.     Each patient to whom I provide medical services by telemedicine is:  (1) informed of the relationship between the physician and patient and the respective role of any other health care provider with respect to management of the patient; and (2) notified that they may decline to receive medical services by telemedicine and may withdraw from such care at any time. Patient verbally consented to receive this service via voice-only telephone call.    MRI not complete; he may follow up as needed.        This service was not originating from a related E/M service provided within the previous 7 days nor will  to an E/M service or procedure within the next 24 hours or my soonest available appointment.  Prevailing standard of care was able to be met in this audio-only visit.

## 2023-11-28 ENCOUNTER — OFFICE VISIT (OUTPATIENT)
Dept: ORTHOPEDICS | Facility: CLINIC | Age: 58
End: 2023-11-28
Payer: MEDICARE

## 2023-11-28 DIAGNOSIS — M51.36 DDD (DEGENERATIVE DISC DISEASE), LUMBAR: Primary | ICD-10-CM

## 2023-11-28 PROCEDURE — 99499 UNLISTED E&M SERVICE: CPT | Mod: 95,,, | Performed by: REGISTERED NURSE

## 2023-11-28 PROCEDURE — 99499 NO LOS: ICD-10-PCS | Mod: 95,,, | Performed by: REGISTERED NURSE

## 2023-12-07 ENCOUNTER — HOSPITAL ENCOUNTER (EMERGENCY)
Facility: HOSPITAL | Age: 58
Discharge: HOME | End: 2023-12-07
Attending: STUDENT IN AN ORGANIZED HEALTH CARE EDUCATION/TRAINING PROGRAM
Payer: MEDICARE

## 2023-12-07 VITALS
BODY MASS INDEX: 45.1 KG/M2 | HEIGHT: 70 IN | HEART RATE: 100 BPM | DIASTOLIC BLOOD PRESSURE: 90 MMHG | OXYGEN SATURATION: 96 % | SYSTOLIC BLOOD PRESSURE: 175 MMHG | WEIGHT: 315 LBS | TEMPERATURE: 98 F | RESPIRATION RATE: 20 BRPM

## 2023-12-07 DIAGNOSIS — M25.552 LEFT HIP PAIN: Primary | ICD-10-CM

## 2023-12-07 PROCEDURE — 99284 EMERGENCY DEPT VISIT MOD MDM: CPT | Mod: ER

## 2023-12-07 PROCEDURE — 96372 THER/PROPH/DIAG INJ SC/IM: CPT | Performed by: PHYSICIAN ASSISTANT

## 2023-12-07 PROCEDURE — 63600175 PHARM REV CODE 636 W HCPCS: Mod: ER | Performed by: PHYSICIAN ASSISTANT

## 2023-12-07 RX ORDER — MELOXICAM 15 MG/1
15 TABLET ORAL DAILY
Qty: 30 TABLET | Refills: 0 | Status: SHIPPED | OUTPATIENT
Start: 2023-12-07 | End: 2024-02-29

## 2023-12-07 RX ORDER — KETOROLAC TROMETHAMINE 30 MG/ML
15 INJECTION, SOLUTION INTRAMUSCULAR; INTRAVENOUS
Status: COMPLETED | OUTPATIENT
Start: 2023-12-07 | End: 2023-12-07

## 2023-12-07 RX ADMIN — KETOROLAC TROMETHAMINE 15 MG: 30 INJECTION, SOLUTION INTRAMUSCULAR at 06:12
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
## 2024-02-20 ENCOUNTER — OFFICE VISIT (OUTPATIENT)
Dept: ORTHOPEDICS | Facility: CLINIC | Age: 59
End: 2024-02-20
Payer: MEDICARE

## 2024-02-20 VITALS — BODY MASS INDEX: 45.1 KG/M2 | HEIGHT: 70 IN | WEIGHT: 315 LBS

## 2024-02-20 DIAGNOSIS — E66.01 MORBID OBESITY DUE TO EXCESS CALORIES: ICD-10-CM

## 2024-02-20 DIAGNOSIS — M51.36 DDD (DEGENERATIVE DISC DISEASE), LUMBAR: ICD-10-CM

## 2024-02-20 DIAGNOSIS — M16.12 PRIMARY OSTEOARTHRITIS OF LEFT HIP: Primary | ICD-10-CM

## 2024-02-20 DIAGNOSIS — M16.11 ARTHRITIS OF RIGHT HIP: ICD-10-CM

## 2024-02-20 PROCEDURE — 99999 PR PBB SHADOW E&M-EST. PATIENT-LVL III: CPT | Mod: PBBFAC,,, | Performed by: PHYSICIAN ASSISTANT

## 2024-02-20 PROCEDURE — 99214 OFFICE O/P EST MOD 30 MIN: CPT | Mod: S$GLB,,, | Performed by: PHYSICIAN ASSISTANT

## 2024-02-20 RX ORDER — METHYLPREDNISOLONE 4 MG/1
TABLET ORAL
Qty: 21 EACH | Refills: 0 | Status: SHIPPED | OUTPATIENT
Start: 2024-02-20 | End: 2024-02-29 | Stop reason: ALTCHOICE

## 2024-02-20 NOTE — PROGRESS NOTES
Subjective:      Patient ID: Tadeo Riley is a 58 y.o. male.    Chief Complaint: Pain of the Left Hip and Consult    Mr. Riley is a 58-year-old male, new to our clinic, who presents with a prolonged history of left hip pain worsening over the past year.  He localizes pain to his groin as well as low back.  Symptoms worsen with ambulation and hip range of motion.  Patient denies instability or paresthesias.  He has not completed physical therapy for this particular issue.  Patient taking Meloxicam and reports little relief.  Affecting his ADLs as he has not able to stand or ambulate for prolonged periods of time.    Underwent IR injection left hip in October of 2023.  He notes moderate to good relief x 2mos.    Hx of ORIF R hip in 1985    Seen by spine surgery on 11/06/2023 for complaints of low back pain that is worse with walking and improved with sitting.  No associated numbness distally.  Prior treatments tried include PT, Advil, Tylenol.  He was recommended MRI lumbar spine and prescribed naproxen, Neurontin and Flexeril. He notes little relief following these measures.    Review of Systems   Constitutional: Negative for chills and fever.   Cardiovascular:  Negative for chest pain.   Respiratory:  Negative for cough.    Hematologic/Lymphatic: Does not bruise/bleed easily.   Skin:  Negative for poor wound healing and rash.   Musculoskeletal:  Positive for joint pain, myalgias and stiffness.   Gastrointestinal:  Negative for abdominal pain.   Genitourinary:  Negative for bladder incontinence.   Neurological:  Negative for dizziness, loss of balance and weakness.   Psychiatric/Behavioral:  Negative for altered mental status.        Review of patient's allergies indicates:   Allergen Reactions    Metformin Hives    Lisinopril Other (See Comments)    Benadryl [diphenhydramine hcl] Rash        Current Outpatient Medications   Medication Sig Dispense Refill    aspirin (ECOTRIN) 81 MG EC tablet Take 81 mg by mouth  "once daily.      DULoxetine (CYMBALTA) 30 MG capsule Take 30 mg by mouth nightly.      furosemide (LASIX) 40 MG tablet Take 40 mg by mouth once daily.      gabapentin (NEURONTIN) 300 MG capsule Take 1-2 capsules (300-600 mg total) by mouth every evening. 60 capsule 11    insulin NPH-insulin regular, 70/30, (NOVOLIN 70/30 U-100 INSULIN) 100 unit/mL (70-30) injection Inject 50 units twice a day by subcutaneous route. (Patient taking differently: Inject 50 Units into the skin 2 (two) times daily. Inject 50 units twice a day by subcutaneous route.) 30 mL 11    meloxicam (MOBIC) 15 MG tablet Take 1 tablet (15 mg total) by mouth once daily. 30 tablet 0    vitamin D (VITAMIN D3) 1000 units Tab Take by mouth.      losartan-hydrochlorothiazide 100-25 mg (HYZAAR) 100-25 mg per tablet Take 1 tablet by mouth once daily. 90 tablet 3    pravastatin (PRAVACHOL) 40 MG tablet Take 40 mg by mouth once daily.      spironolactone (ALDACTONE) 50 MG tablet Take 50 mg by mouth once daily.      tadalafiL (CIALIS) 10 MG tablet Take 10 mg by mouth daily as needed.       No current facility-administered medications for this visit.        The patient's relevant past medical, surgical, and social history was reviewed in Epic.       Objective:      VITAL SIGNS: Ht 5' 10" (1.778 m)   Wt (!) 151.5 kg (334 lb)   BMI 47.92 kg/m²     BMI 47.9    LEFT HIP EXAM  The patient is tearful throughout exam.   Body habitus is::obese.   The patient walks with a limp.   The skin over the hip is::intact.   There is::no local tenderness, tenderness anteriorlay, and tenderness laterally.  Range of motion- Flexion 95, External rotation 5, internal rotation 25. Pain during all ROM  Resisted SLR positive.  Pain with rotation positive  Sciatic tension findings unable to fully assess 2/2 pain during hip ROM  Pulses DP present, PT present.  Motor decreased strength 2/2 guarding from pain  Sensory normal.      L Hip XR from 3- shows severe degenerative changes " of the left femoral acetabular joint. Also, severe post traumatic arthritis can be seen to the right femoral acetabular joint with previous fracture fixation hardware.    Assessment:       1. Primary osteoarthritis of left hip    2. DDD (degenerative disc disease), lumbar    3. Morbid obesity due to excess calories    4. Arthritis of right hip            Plan:         Tadeo was seen today for pain and consult.    Diagnoses and all orders for this visit:    Primary osteoarthritis of left hip    DDD (degenerative disc disease), lumbar    Morbid obesity due to excess calories    Arthritis of right hip    We reviewed patient's most recent x-rays at the bedside which show severe degenerative changes to bilateral hip joints.  Patient is currently only symptomatic to the left hip.  Previous orthopedist have recommended weight loss prior to surgical intervention.    Recent IR injection to left hip provided good pain relief.  Consider repeat injection.    We discussed his current BMI.  Patient recommended weight loss prior to surgical intervention.    Imaging:  Patient encouraged to complete L-spine MRI as recommended by the spine Clinic.  He would prefer an open MRI.  I will send a message to the spine clinic requesting alteration of order.    Pain control: continue Meloxicam.  We reviewed the family of medicines within anti-inflammatories.  He was counseled to be careful not to exceed 1 type of anti-inflammatory for treatment at a time.    Rx: Medrol dosepak with guidance to continue close monitoring of glucose.  If glucose becomes out of control he is to discontinue steroid pack.    DME: crutches provided per patient request    Follow Up: with Dr. Holguin to discuss surgical intervention

## 2024-02-29 ENCOUNTER — TELEPHONE (OUTPATIENT)
Dept: PAIN MEDICINE | Facility: CLINIC | Age: 59
End: 2024-02-29
Payer: MEDICARE

## 2024-02-29 ENCOUNTER — OFFICE VISIT (OUTPATIENT)
Dept: ORTHOPEDICS | Facility: CLINIC | Age: 59
End: 2024-02-29
Payer: MEDICARE

## 2024-02-29 DIAGNOSIS — G89.29 CHRONIC LEFT HIP PAIN: Primary | ICD-10-CM

## 2024-02-29 DIAGNOSIS — G89.29 CHRONIC LEFT HIP PAIN: ICD-10-CM

## 2024-02-29 DIAGNOSIS — M25.552 CHRONIC LEFT HIP PAIN: ICD-10-CM

## 2024-02-29 DIAGNOSIS — M25.552 CHRONIC LEFT HIP PAIN: Primary | ICD-10-CM

## 2024-02-29 DIAGNOSIS — M16.12 PRIMARY OSTEOARTHRITIS OF LEFT HIP: Primary | ICD-10-CM

## 2024-02-29 DIAGNOSIS — Z96.642 S/P TOTAL LEFT HIP ARTHROPLASTY: Primary | ICD-10-CM

## 2024-02-29 PROCEDURE — 99999 PR PBB SHADOW E&M-EST. PATIENT-LVL III: CPT | Mod: PBBFAC,,, | Performed by: ORTHOPAEDIC SURGERY

## 2024-02-29 PROCEDURE — 99214 OFFICE O/P EST MOD 30 MIN: CPT | Mod: S$GLB,,, | Performed by: ORTHOPAEDIC SURGERY

## 2024-02-29 RX ORDER — NAPROXEN 500 MG/1
500 TABLET ORAL 2 TIMES DAILY WITH MEALS
Qty: 60 TABLET | Refills: 1 | Status: SHIPPED | OUTPATIENT
Start: 2024-02-29

## 2024-02-29 RX ORDER — NAPROXEN 500 MG/1
500 TABLET ORAL 2 TIMES DAILY WITH MEALS
Qty: 60 TABLET | Refills: 1 | Status: SHIPPED | OUTPATIENT
Start: 2024-02-29 | End: 2024-02-29 | Stop reason: SDUPTHER

## 2024-02-29 NOTE — TELEPHONE ENCOUNTER
----- Message from JUAN Richter sent at 2024  1:21 PM CST -----  Regarding: Order for ERIC FLORES    Patient Name: ERIC FLORES([id number])  Sex: Male  : 1965      PCP: ASIF IYER    Center: [facility]     Types of orders made on 2024: Procedure Request    Order Date:2024  Ordering User:ALAN BELTRAN [[id number]  Encounter Provider:Alan Beltran FNP [[id number]]  Authorizing Provider:   Alan Beltran FNP [[id number]]  Supervising Provider:LIBIA TORIBIO [[id number]]  Type of Supervision:Collaborating Physician  Department:[facility] PAIN MANAGEMENT[[id number]]    Common Order Information  Procedure -> Joint/Bursa Injection (Specify joint and laterality) Cmt: left hip             injection    Pre-op Diagnosis -> Primary osteoarthritis of left hip       -> Chronic left hip pain     Order Specific Information  Order:   bProcedure Request Order for Pain Management [Custom: [id number]]  Order #:          [id number]: 1 FUTURE    Priority: Routine  Class: Clinic Performed    Future Order Information      Expires on:2025            Expected by:2024                   Associated Diagnoses      M16.12 Primary osteoarthritis of left hip      M25.552, G89.29 Chronic left hip pain      Physician -> Gelter         Is pat  ient on anti-coagulants? -> No         Facility Name: -> [facility]         Follow-up: -> 2 weeks           Priority: Routine  Class: Clinic Performed    Future Order Information      Expires on:2025            Expected by:2024                   Associated Diagnoses      M16.12 Primary osteoarthritis of left hip      M25.552, G89.29 Chronic left hip pain      Procedure -> Joint/Bursa Injection (Specify j  oint and laterality) Cmt: left                 hip injection        Physician -> Gelter         Is patient on anti-coagulants? -> No         Pre-op Diagnosis -> Primary osteoarthritis of left hip           -> Chronic left hip pain         Facility Name: ->  [facility]         Follow-up: -> 2 weeks

## 2024-02-29 NOTE — LETTER
02/29/2024    To whom it may concern:    Tadeo Riley is under my medical care. He was seen today.    The patient may not return to work until further notice.     Yours truly,        Michael Holguin MD

## 2024-02-29 NOTE — PROGRESS NOTES
Subjective:      Patient ID: Tadeo Riley is a 58 y.o. male.    Chief Complaint: Pain of the Left Hip and Follow-up    HPI    Follow-up for left hip pain.  The patient tried a Medrol Dosepak without much benefit.  Using crutches.  Previous radiographs document severe osteoarthritis of his left hip.  The patient has longstanding posttraumatic changes in his right hip as well.  The patient denies much in the way of right hip symptoms at this time          Review of Systems   Constitutional: Negative for fever and weight loss.   HENT:  Negative for congestion.    Eyes:  Negative for visual disturbance.   Cardiovascular:  Negative for chest pain.   Respiratory:  Negative for shortness of breath.    Hematologic/Lymphatic: Negative for bleeding problem. Does not bruise/bleed easily.   Skin:  Negative for poor wound healing.   Musculoskeletal:  Positive for joint pain.   Gastrointestinal:  Negative for abdominal pain.   Genitourinary:  Negative for dysuria.   Neurological:  Negative for seizures.   Psychiatric/Behavioral:  Negative for altered mental status.    Allergic/Immunologic: Negative for persistent infections.         Objective:      Ortho/SPM Exam      Left hip    The patient is not in acute distress.   Body habitus is:obese.   Sclerae normal  The patient walks with a limp.   Respiratory distress:  none  The skin over the hip is:intact.   There is:no local tenderness.   Range of motion- Flexion 75, External rotation 20, internal rotation 0.  Resisted SLR positive.  Pain with rotation positive  Sciatic tension findings negative.  Shortening/lengthening compared to the contralateral side exam deferred.  Pulses DP present, PT present.  Motor normal 5/5 strength in all tested muscle groups.   Sensory normal.    Left hip radiographs show complete joint space loss with sclerosis and osteophytes.  The right hip is also noted to have severe loss of joint space with retained femoral hardware          Assessment:        Encounter Diagnosis   Name Primary?    Primary osteoarthritis of left hip Yes        The condition is structurally advanced.  The patient has significant pain and functional impairment.  The only intervention likely to be of meaningful benefit is arthroplasty.          Plan:       Tadeo was seen today for pain and follow-up.    Diagnoses and all orders for this visit:    Primary osteoarthritis of left hip          I explained my diagnostic impression and the reasoning behind it in detail, using layman's terms.  Models and/or pictures were used to help in the explanation.    The patient hopes to delay surgery for a little while in order to arrange for time off work.  He requested pain clinic treatment.  I explained that if the hip is injected he needs to wait 3 months after the injection before arthroplasty.  The patient understands and accepts this.    We will discontinue meloxicam and start Naprosyn    The process of left total hip replacement was explained to the patient.  The nature of the procedure was explained using a model.  The expected perioperative clinical course and period of recovery as applicable to the patient's condition was described.  The risks including death, infection, thromboembolic events, instability, leg length discrepancy, persistent pain/stiffness, fracture around implant and implant failure due to wear or loosening, with possible need for reoperation, were all explained.  The possibility and expectations of continued nonsurgical care were reviewed.  The patient understands and wishes to proceed with the recommended operation.

## 2024-03-04 NOTE — PROGRESS NOTES
[facility] Medicine Resident VERENICEI Progress Note    Subjective:      Patient reports much improvement from yesterday and desires discharge. Improved sob and blank. Denies cp, abd pain, n/v, fever/chills/sweats.     Objective:   Last 24 Hour Vital Signs:  BP  Min: 132/78  Max: 158/90  Temp  Av.6 °F (36.4 °C)  Min: 97.3 °F (36.3 °C)  Max: 97.9 °F (36.6 °C)  Pulse  Av.5  Min: 76  Max: 93  Resp  Av.1  Min: 16  Max: 29  SpO2  Av.2 %  Min: 91 %  Max: 98 %  I/O last 3 completed shifts:  In: 270 [P.O.:270]  Out: 1075 [Urine:1075]    Physical Examination:  General Appearance:    sitting in bed with NC in place  no acute distress   Head:    Normocephalic, without obvious abnormality, atraumatic   Eyes:    PERRL, conjunctiva/corneas clear, EOM's intact   Nose:   Nares normal, septum midline, mucosa normal, no drainage    or sinus tenderness   Throat:   Lips, mucosa, and tongue normal; teeth and gums normal   Neck:   Supple, symmetrical, trachea midline, no adenopathy;        thyroid:  No enlargement/tenderness/nodules; no carotid    Bruit. JVP unable to be assessed 2/2 body habitus.    Back:     Symmetric, no curvature, ROM normal, no CVA tenderness   Lungs:    crackles and end-expiratory wheezing (improved from previous)  no increased wob on RA   Chest wall:    No tenderness or deformity   Heart:    Tachycardia, Regular rhythm, nml S1 and prominent S2 normal,   Abdomen:     Soft, non-tender, bowel sounds active all four quadrants,     no masses, no organomegaly   Extremities:   Extremities normal, atraumatic, no cyanosis. 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 pT2,pN0, pMx.  She is on tamoxifen.  She underwent bilateral chest wall revision in 2/2020.  Presents with some neck swelling and complaining of new axillary pain bilaterally.  We also spoke about obtaining a bilateral breast/axillary us and a neck ultrasound.  All questions and concerns were answered in detail.  Patient is for bilateral breast US now.  She is for neck US now.  She is to follow up with med onc as soon as possible.  She is to follow up with gyn as she in on tamoxifen.  Total time spent on encounter was greater than 30 minutes , which included face to face time with the patient, performing an exam, reviewing previous medical records, reviewing current imaging/ pathology, documenting in patient record and coordinating care/imaging. Greater than 50% of the encounter was spent on counseling and coordination of her breast issue.   HCT 33.4* 32.7* 33.4*    292 282   MCV 79* 80* 80*   RDW 14.3 14.2 14.2    141 140   K 4.2 4.0 3.5    103 103   CO2 27 30* 29   BUN 33* 24* 17   CREATININE 1.4 1.1 0.9   * 155* 83   PROT 7.7 7.0 6.9   ALBUMIN 3.1* 3.0* 3.0*   BILITOT 0.3 0.3 0.3   AST 14 13 13   ALKPHOS 144* 125 114   ALT 42 32 26       Microbiology Data Reviewed: yes  Pertinent Findings:  BCx (5/7): ngtd    Radiology Data Reviewed: yes  Pertinent Findings:  LE US (5/8): no DVT  CTA (5/8): no PE  CXR (5/7): no acute process    Current Medications:     Infusions:       Scheduled:   albuterol-ipratropium 2.5mg-0.5mg/3mL  3 mL Nebulization Q4H    amLODIPine  10 mg Oral Daily    fluticasone-vilanterol  1 puff Inhalation Daily    heparin (porcine)  5,000 Units Subcutaneous Q12H    insulin aspart protamine-insulin aspart  55 Units Subcutaneous BID AC    insulin aspart U-100  1-10 Units Subcutaneous Q4H    lisinopril  10 mg Oral Daily    moxifloxacin  400 mg Oral Q24H    pravastatin  40 mg Oral Daily    predniSONE  40 mg Oral Daily        PRN:  dextrose 50%, dextrose 50%, glucagon (human recombinant), glucose, glucose, pneumoc 13-aniceto conj-dip cr(PF)    Antibiotics and Day Number of Therapy:  Moxifloxacin (5/7-present)    Assessment:     Tadeo Riley is a 52 y.o.male with  Patient Active Problem List    Diagnosis Date Noted    Dyspnea 05/10/2018    COPD with acute exacerbation 05/08/2018    Acute hypoxemic respiratory failure 05/08/2018    Essential hypertension 05/08/2018    Type 2 diabetes mellitus, with long-term current use of insulin 05/08/2018    MARTHA (acute kidney injury) 05/08/2018    Hyperlipidemia 05/08/2018    Microcytic anemia 05/08/2018    Cough 05/08/2018        Plan:     Acute Hypoxic Respiratory Distress 2/2 likely reactive airway disease/restrictive lung disease   -SOB, wheezing and cough for 3 days   -BNP 39, trop negative, EKG without acute ischemia  -CXR pa/lat negative  -CT a chest: No  evidence of central pulmonary embolism. LE US neg for DVT  -VBG WNL, solumedrol 125mg IV in ED  -initially required bipap 2/2 sob and hypoxia  able to be weaned to NC with bipap at night  -pulm consulted, appreciate recs, will discontinue breo on discharge, Need sleep study, weight loss, PFTs as outpatient   -cont prednisone 40mg daily, q4h duonebs, Moxi 400mg q24 po  -procal wnl, alpha 1 antitrypsin not low      MARTHA, resolved  -gfr >60, Cr 1.5 on admit  no baseline labs  -fe urea 34.8%  -UA without protein  -likely chronic 2/2 uncontrolled DM  -improved     HTN, Controlled   -BP elevated   -On home HCTZ-lisinopril 12.5-20mg  held 2/2   -started on amlodipine 10  with resolution of MARTHA, will start back lisinopril at 10 and titrate up     Uncontrolled DMII with Hyperglycemia and renal manifestations  -Patients taking 50 units BID of 70/30 Novolin   -glucose 494  -10 units insulin in ED and will give 70/30 BID in house with SSI  -a1c 9.4  -Patient on steroids  titrate insulin as needed      HLD  -Cont home pravastatin 40mg     Anemia of Chronic Disease  -Hgb/Hct 11.4/34.2, MCV 80  -tibc 260 and elevated ferritin  -hgb stable without signs of bleeding     Mild Transaminits, stable   -AST/ALT 36/55  -monitor    Dispo: home today     Valentina Veronica  [facility] Medicine HO-I  [facility] Hospitalist Medicine Service Team A    [facility] Medicine Hospitalist Pager numbers:   [facility] Hospitalist Medicine Team A (Alea/Alejandro): [phone]  [facility] Hospitalist Medicine Team B (Yamilka/Roseline):  [phone]

## 2024-03-13 ENCOUNTER — TELEPHONE (OUTPATIENT)
Dept: PAIN MEDICINE | Facility: CLINIC | Age: 59
End: 2024-03-13
Payer: MEDICARE

## 2024-03-19 NOTE — PRE-PROCEDURE INSTRUCTIONS
Patient reviewed on 3/19/2024.  Okay to proceed at [place]. The following pre-procedure instructions and arrival time have been reviewed with patient via phone, no portal access.  Patient verbalized an understanding.  Pt to be accompanied by wife Nyla day of procedure as responsible .

## 2024-03-20 ENCOUNTER — HOSPITAL ENCOUNTER (OUTPATIENT)
Facility: HOSPITAL | Age: 59
Discharge: HOME | End: 2024-03-20
Attending: STUDENT IN AN ORGANIZED HEALTH CARE EDUCATION/TRAINING PROGRAM | Admitting: STUDENT IN AN ORGANIZED HEALTH CARE EDUCATION/TRAINING PROGRAM
Payer: MEDICARE

## 2024-03-20 ENCOUNTER — HOSPITAL ENCOUNTER (EMERGENCY)
Facility: HOSPITAL | Age: 59
Discharge: HOME | End: 2024-03-20
Attending: EMERGENCY MEDICINE
Payer: MEDICARE

## 2024-03-20 ENCOUNTER — TELEPHONE (OUTPATIENT)
Dept: PAIN MEDICINE | Facility: CLINIC | Age: 59
End: 2024-03-20
Payer: MEDICARE

## 2024-03-20 VITALS
RESPIRATION RATE: 18 BRPM | BODY MASS INDEX: 45.1 KG/M2 | DIASTOLIC BLOOD PRESSURE: 84 MMHG | SYSTOLIC BLOOD PRESSURE: 134 MMHG | HEART RATE: 82 BPM | HEIGHT: 70 IN | WEIGHT: 315 LBS | TEMPERATURE: 98 F | OXYGEN SATURATION: 100 %

## 2024-03-20 DIAGNOSIS — M16.12 PRIMARY OSTEOARTHRITIS OF LEFT HIP: Primary | ICD-10-CM

## 2024-03-20 DIAGNOSIS — G89.29 CHRONIC HIP PAIN, LEFT: Primary | ICD-10-CM

## 2024-03-20 DIAGNOSIS — M25.552 CHRONIC HIP PAIN, LEFT: Primary | ICD-10-CM

## 2024-03-20 DIAGNOSIS — M25.552 CHRONIC LEFT HIP PAIN: ICD-10-CM

## 2024-03-20 DIAGNOSIS — G89.29 CHRONIC PAIN: ICD-10-CM

## 2024-03-20 DIAGNOSIS — E11.65 HYPERGLYCEMIA DUE TO DIABETES MELLITUS: ICD-10-CM

## 2024-03-20 DIAGNOSIS — G89.29 CHRONIC LEFT HIP PAIN: ICD-10-CM

## 2024-03-20 LAB
POCT GLUCOSE: 201 MG/DL (ref 70–110)
POCT GLUCOSE: 248 MG/DL (ref 70–110)
POCT GLUCOSE: 251 MG/DL (ref 70–110)

## 2024-03-20 PROCEDURE — 96372 THER/PROPH/DIAG INJ SC/IM: CPT

## 2024-03-20 PROCEDURE — 99284 EMERGENCY DEPT VISIT MOD MDM: CPT | Mod: 25,ER

## 2024-03-20 PROCEDURE — 82962 GLUCOSE BLOOD TEST: CPT | Performed by: STUDENT IN AN ORGANIZED HEALTH CARE EDUCATION/TRAINING PROGRAM

## 2024-03-20 PROCEDURE — 63600175 PHARM REV CODE 636 W HCPCS: Mod: ER

## 2024-03-20 PROCEDURE — 82962 GLUCOSE BLOOD TEST: CPT | Mod: ER

## 2024-03-20 RX ORDER — ALPRAZOLAM 0.5 MG/1
0.5 TABLET, ORALLY DISINTEGRATING ORAL ONCE AS NEEDED
Status: DISCONTINUED | OUTPATIENT
Start: 2024-03-20 | End: 2024-03-20 | Stop reason: HOSPADM

## 2024-03-20 RX ORDER — DICLOFENAC SODIUM 10 MG/G
2 GEL TOPICAL 4 TIMES DAILY
Qty: 20 G | Refills: 0 | Status: SHIPPED | OUTPATIENT
Start: 2024-03-20

## 2024-03-20 RX ORDER — METHOCARBAMOL 500 MG/1
500 TABLET, FILM COATED ORAL 4 TIMES DAILY
Qty: 40 TABLET | Refills: 0 | Status: SHIPPED | OUTPATIENT
Start: 2024-03-20 | End: 2024-03-30

## 2024-03-20 RX ORDER — INSULIN PUMP SYRINGE, 3 ML
EACH MISCELLANEOUS
Qty: 1 EACH | Refills: 0 | Status: SHIPPED | OUTPATIENT
Start: 2024-03-20 | End: 2025-03-20

## 2024-03-20 RX ORDER — LIDOCAINE 50 MG/G
1 PATCH TOPICAL DAILY
Qty: 5 PATCH | Refills: 0 | Status: SHIPPED | OUTPATIENT
Start: 2024-03-20 | End: 2024-03-25

## 2024-03-20 RX ORDER — KETOROLAC TROMETHAMINE 30 MG/ML
15 INJECTION, SOLUTION INTRAMUSCULAR; INTRAVENOUS
Status: COMPLETED | OUTPATIENT
Start: 2024-03-20 | End: 2024-03-20

## 2024-03-20 RX ADMIN — KETOROLAC TROMETHAMINE 15 MG: 30 INJECTION, SOLUTION INTRAMUSCULAR; INTRAVENOUS at 11:03

## 2024-03-20 NOTE — DISCHARGE INSTRUCTIONS
Thank you for letting me care for you today - it was nice to meet you and I hope you feel better soon. Please return to the ER if your symptoms don't improve or get worse. And be sure to follow up with your primary care provider within the next week and with your Interventional Pain Management doctor for follow up care.     Our goal at [facility] is to always give you outstanding care and exceptional service. You may receive a survey by mail or email in the next week about your experience in our ED. We would greatly appreciate you completing and returning the survey. Your feedback provides us with a way to recognize our staff who give very good care and it helps us learn how to improve when your experience was below our aspiration of excellence.     All the best,     Lotus Douglas, MPH, PA-C  Emergency Department Physician Assistant  [facility], [facility]

## 2024-03-20 NOTE — PROGRESS NOTES
CBG recheck 251, procedure to be rescheduled. RN assisted pt with dressing and transferred to wheelchair to be rolled out to his wife waiting in the back of the facility for him.

## 2024-03-20 NOTE — H&P
Patient's blood glucose is elevated, >200. Unfortunately, we will have to reschedule this patient's procedure due to hyperglycemia.     Leeanne Paulino, DO   Interventional Pain Management

## 2024-03-20 NOTE — PROGRESS NOTES
Pt CBG MD Husam aware and spoke with pt about rescheduling procedure for safer CBG parameters. Pt understanding, but would like for sugar to be rechecked in 10 min since he took his regular insulin this AM at 0645. Pt states he is in a lot of pain and will need to go to the emergency room if unable to do the procedure. Pt was unable to walk from the waiting area to preop area due to pain. Pt is using crutches at home to ambulate.

## 2024-03-20 NOTE — TELEPHONE ENCOUNTER
----- Message from Roverto Bach MA sent at 3/20/2024  1:21 PM CDT -----  Pt is calling to reschedule his procedure. Can someone call pt to reschedule his procedure.       Thank You     Roverto'   ----- Message -----  From: Negin Urban  Sent: 3/20/2024  12:24 PM CDT  To: L[facility] Fallon Staff    Type:  Patient Returning Call    Who Called:pt  Who Left Message for Patient:  Does the patient know what this is regarding?:  Would the patient rather a call back or a response via [facility]? call  Best Call Back Number: [phone]  Additional Information: pt states sugar has gone down and needs to come in to get injection in his hip because he is hurting very badly. Please call asap
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
## 2024-03-26 NOTE — PRE-PROCEDURE INSTRUCTIONS
Your arrival time is 1240 and is roughly 1 hour before your anticipated procedure time to allow sufficient time for pre-op..  This procedure will take place at the [facility] at the corner of [facility].  It is in the [place] next to [facility].  The address is:     [address].  [place] [address]     After entering the building, you will proceed to the second floor where you can check in with registration. You should take any medications that you routinely take for blood pressure, heart medications, thyroid, cholesterol, etc. As directed     The fasting restrictions are dependent on whether or not you are receiving sedation.        You CANNOT drive yourself and must have a .     If you are on blood thinners, you need to follow the anticoagulation instructions that had been discussed previously.  You should only stop the blood thinners if it was approved by your primary care physician or your cardiologist.  In the event that you are not able to stop your blood thinners, a blood thinner was not listed on your medication list, or we were not able to get clearance from your cardiologist, then the procedure may have to be postponed/canceled.      IF you were told to stop your blood thinners, this is how long you should generally hold some of the more common ones.  Remember that stopping blood thinners is only necessary for certain procedures. If you are unsure of your instructions, please call us.   Aspirin - 5 days  Plavix/Clopidogrel - 7 days  Warfarin / Coumadin - 5 days  Eliquis - 3 days  Pradaxa/Dabigatran - 4 days  Xarelto/Rivaroxaban - 3 days     If you are a diabetic, do not take your medication if you will be fasting, but bring it with you.          *HOLD ALL VITAMINS, MINERALS, HERBS (INCLUDING HERBAL TEAS) AND SUPPLEMENTS  *SHOWER WITH ANTIBACTERIAL SOAP (ex:. DIAL) NIGHT BEFORE AND MORNING OF PROCEDURE  *DO NOT APPLY ANY  LOTIONS, OILS, POWDERS, PERFUME/COLOGNE, OINTMENTS, GELS, CREAMS, MAKEUP OR DEODORANT TO YOUR SKIN MORNING OF PROCEDURE  *LEAVE JEWELRY AND ANY VALUABLES AT HOME  *WEAR LOOSE COMFORTABLE CLOTHING (PREFERABLY A BUTTON UP SHIRT)

## 2024-03-27 ENCOUNTER — HOSPITAL ENCOUNTER (OUTPATIENT)
Facility: HOSPITAL | Age: 59
Discharge: HOME | End: 2024-03-27
Attending: STUDENT IN AN ORGANIZED HEALTH CARE EDUCATION/TRAINING PROGRAM | Admitting: STUDENT IN AN ORGANIZED HEALTH CARE EDUCATION/TRAINING PROGRAM
Payer: MEDICARE

## 2024-03-27 VITALS
SYSTOLIC BLOOD PRESSURE: 156 MMHG | HEART RATE: 88 BPM | OXYGEN SATURATION: 99 % | RESPIRATION RATE: 16 BRPM | HEIGHT: 71 IN | WEIGHT: 315 LBS | DIASTOLIC BLOOD PRESSURE: 92 MMHG | TEMPERATURE: 98 F | BODY MASS INDEX: 44.1 KG/M2

## 2024-03-27 DIAGNOSIS — G89.29 CHRONIC PAIN: ICD-10-CM

## 2024-03-27 DIAGNOSIS — M16.12 PRIMARY OSTEOARTHRITIS OF LEFT HIP: Primary | ICD-10-CM

## 2024-03-27 DIAGNOSIS — M25.552 PAIN OF LEFT HIP: ICD-10-CM

## 2024-03-27 LAB — POCT GLUCOSE: 69 MG/DL (ref 70–110)

## 2024-03-27 PROCEDURE — 82962 GLUCOSE BLOOD TEST: CPT | Performed by: STUDENT IN AN ORGANIZED HEALTH CARE EDUCATION/TRAINING PROGRAM

## 2024-03-27 PROCEDURE — 63600175 PHARM REV CODE 636 W HCPCS: Mod: JZ,JG | Performed by: STUDENT IN AN ORGANIZED HEALTH CARE EDUCATION/TRAINING PROGRAM

## 2024-03-27 PROCEDURE — 25000003 PHARM REV CODE 250: Performed by: STUDENT IN AN ORGANIZED HEALTH CARE EDUCATION/TRAINING PROGRAM

## 2024-03-27 PROCEDURE — 77002 NEEDLE LOCALIZATION BY XRAY: CPT | Mod: 26,,, | Performed by: STUDENT IN AN ORGANIZED HEALTH CARE EDUCATION/TRAINING PROGRAM

## 2024-03-27 PROCEDURE — 20610 DRAIN/INJ JOINT/BURSA W/O US: CPT | Mod: LT | Performed by: STUDENT IN AN ORGANIZED HEALTH CARE EDUCATION/TRAINING PROGRAM

## 2024-03-27 PROCEDURE — 25500020 PHARM REV CODE 255: Performed by: STUDENT IN AN ORGANIZED HEALTH CARE EDUCATION/TRAINING PROGRAM

## 2024-03-27 PROCEDURE — 20610 DRAIN/INJ JOINT/BURSA W/O US: CPT | Mod: LT,,, | Performed by: STUDENT IN AN ORGANIZED HEALTH CARE EDUCATION/TRAINING PROGRAM

## 2024-03-27 RX ORDER — TRIAMCINOLONE ACETONIDE 40 MG/ML
INJECTION, SUSPENSION INTRA-ARTICULAR; INTRAMUSCULAR
Status: DISCONTINUED | OUTPATIENT
Start: 2024-03-27 | End: 2024-03-27 | Stop reason: HOSPADM

## 2024-03-27 RX ORDER — BUPIVACAINE HYDROCHLORIDE 2.5 MG/ML
INJECTION, SOLUTION EPIDURAL; INFILTRATION; INTRACAUDAL
Status: DISCONTINUED | OUTPATIENT
Start: 2024-03-27 | End: 2024-03-27 | Stop reason: HOSPADM

## 2024-03-27 RX ORDER — ALPRAZOLAM 0.5 MG/1
0.5 TABLET, ORALLY DISINTEGRATING ORAL ONCE AS NEEDED
Status: COMPLETED | OUTPATIENT
Start: 2024-03-27 | End: 2024-03-27

## 2024-03-27 RX ORDER — LIDOCAINE HYDROCHLORIDE 20 MG/ML
INJECTION, SOLUTION EPIDURAL; INFILTRATION; INTRACAUDAL; PERINEURAL
Status: DISCONTINUED | OUTPATIENT
Start: 2024-03-27 | End: 2024-03-27 | Stop reason: HOSPADM

## 2024-03-27 RX ADMIN — ALPRAZOLAM 0.5 MG: 0.5 TABLET, ORALLY DISINTEGRATING ORAL at 01:03

## 2024-03-27 NOTE — PLAN OF CARE
Pt in preop bay 29, VSS and IV inserted. Pt denies any open wounds on body or the use of any weight loss injections.
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
## 2024-03-27 NOTE — DISCHARGE SUMMARY
Discharge Note  Short Stay      SUMMARY     Admit Date: 3/27/2024    Attending Physician: Leeanne Paulino      Discharge Physician: Leeanne Paulino      Discharge Date: 3/27/2024 2:07 PM    Procedure(s) (LRB):  LT Hip Inj (Left)    Final Diagnosis: Primary osteoarthritis of left hip [M16.12]  Chronic left hip pain [M25.552, G89.29]    Disposition: Home or self care    Patient Instructions:   Discharge Medication List as of 3/27/2024  1:54 PM        CONTINUE these medications which have NOT CHANGED    Details   aspirin (ECOTRIN) 81 MG EC tablet Take 81 mg by mouth once daily., Historical Med      diclofenac sodium (VOLTAREN) 1 % Gel Apply 2 g topically 4 (four) times daily., Starting Wed 3/20/2024, Normal      DULoxetine (CYMBALTA) 30 MG capsule Take 30 mg by mouth nightly., Starting Mon 1/13/2020, Historical Med      furosemide (LASIX) 40 MG tablet Take 40 mg by mouth once daily., Historical Med      gabapentin (NEURONTIN) 300 MG capsule Take 1-2 capsules (300-600 mg total) by mouth every evening., Starting Mon 11/6/2023, Until Tue 11/5/2024, Normal      insulin NPH-insulin regular, 70/30, (NOVOLIN 70/30 U-100 INSULIN) 100 unit/mL (70-30) injection Inject 50 units twice a day by subcutaneous route., Normal      losartan-hydrochlorothiazide 100-25 mg (HYZAAR) 100-25 mg per tablet Take 1 tablet by mouth once daily., Starting Thu 1/5/2023, Until Wed 3/27/2024, Normal      methocarbamoL (ROBAXIN) 500 MG Tab Take 1 tablet (500 mg total) by mouth 4 (four) times daily. for 10 days, Starting Wed 3/20/2024, Until Sat 3/30/2024, Normal      naproxen (NAPROSYN) 500 MG tablet Take 1 tablet (500 mg total) by mouth 2 (two) times daily with meals., Starting Thu 2/29/2024, Normal      pravastatin (PRAVACHOL) 40 MG tablet Take 40 mg by mouth once daily., Historical Med      tadalafiL (CIALIS) 10 MG tablet Take 10 mg by mouth daily as needed., Historical Med      vitamin D (VITAMIN D3) 1000 units Tab Take by mouth., Starting Tue  10/25/2022, Historical Med      blood-glucose meter (BLOOD GLUCOSE MONITORING) kit Use as instructed, Normal      spironolactone (ALDACTONE) 50 MG tablet Take 50 mg by mouth once daily., Historical Med                 Discharge Diagnosis: Primary osteoarthritis of left hip [M16.12]  Chronic left hip pain [M25.552, G89.29]  Condition on Discharge: Stable with no complications to procedure   Diet on Discharge: Same as before.  Activity: as per instruction sheet.  Discharge to: Home with a responsible adult.  Follow up: 2-4 weeks       Please call my office or pager at [phone] if experienced any weakness or loss of sensation, fever > 101.5, pain uncontrolled with oral medications, persistent nausea/vomiting/or diarrhea, redness or drainage from the incisions, or any other worrisome concerns. If physician on call was not reached or could not communicate with our office for any reason please go to the nearest emergency department    ]

## 2024-03-27 NOTE — OP NOTE
Hip Joint Injection under Fluoroscopic Guidance    The procedure, risks, benefits, and options were discussed with the patient. There are no contraindications to the procedure. The patent expressed understanding and agreed to the procedure. Informed written consent was obtained prior to the start of the procedure and can be found in the patient's chart.    PATIENT NAME: Tadeo Riley   MRN: [id number]     DATE OF PROCEDURE: 03/27/2024    PROCEDURE: Left Hip Joint Injection under Fluoroscopic Guidance    PRE-OP DIAGNOSIS: Primary osteoarthritis of left hip [M16.12]  Chronic left hip pain [M25.552, G89.29]    POST-OP DIAGNOSIS: Primary osteoarthritis of left hip [M16.12]  Chronic left hip pain [M25.552, G89.29]    PHYSICIAN: Leeanne Paulino DO    ASSISTANTS: None     MEDICATIONS INJECTED: Preservative-free Kenalog 40mg with 3cc of Bupivacine 0.25%     LOCAL ANESTHETIC INJECTED: Xylocaine 2%     SEDATION: None    ESTIMATED BLOOD LOSS: None    COMPLICATIONS: None    TECHNIQUE: Time-out was performed to identify the patient and procedure to be performed. With the patient laying in a supine position, the surgical area was prepped and draped in the usual sterile fashion using ChloraPrep and a fenestrated drape. The area overlying the hip joint was determined under fluoroscopy guidance. Skin anesthesia was achieved by injecting Lidocaine 2% over the injection site. A 22 gauge, 5 inch spinal quinke needle was introduced under fluoroscopy until the tip reached the greater trochanter. The tip of the needle was hinged cephalad from the greater trochanter into the joint space.  When the needle tip was in the appropriate position, and there was no blood aspiration, Contrast dye  Omnipaque (300mg/mL) was injected to confirm placement and there was no vascular runoff. 4 mL of the medication mixture listed above was injected slowly. The needles were removed and bleeding was nil. A sterile dressing was applied. No specimens  collected. The patient tolerated the procedure well.      The patient was monitored after the procedure in the recovery area. They were given post-procedure and discharge instructions to follow at home. The patient was discharged in a stable condition.    Leeanne Paulino DO

## 2024-03-27 NOTE — DISCHARGE INSTRUCTIONS
Home Care Instructions Pain Management:    1.  DIET:    You may resume your normal diet today.    2.  BATHING:    You may shower with luke warm water.    3.  DRESSING:    You may remove your bandage today.    4.  ACTIVITY LEVEL:      You may resume your normal activities 24 hours after your procedure.    5.  MEDICATIONS:    You may resume your normal medications today.    6.  SPECIAL INSTRUCTIONS:    No heat to the injection site for 24 hours including bath or shower, heating pad, moist heat or hot tubs.    Use an ice pack to the injection site for any pain or discomfort.  Apply ice packs for 20 minute intervals as needed.    If you have received any sedatives by mouth today, you can not drive for 12 hours.    If you have received sedation through an IV, you can not drive for 24 hours.    PLEASE CALL YOUR DOCTOR FOR THE FOLLOWIN.  Redness or swelling around the injection site.  2.  Fever of 101 degrees.  3.  Drainage (pus) from the injection site.  4.  For any continuous bleeding (some dried blood over the incision is normal.)    FOR EMERGENCIES:    If any unusual problems or difficulties occur during clinic hours, call [phone] or dial [phone].    Follow up with with your physician in 2-3 weeks.

## 2024-04-19 ENCOUNTER — TELEPHONE (OUTPATIENT)
Dept: ORTHOPEDICS | Facility: CLINIC | Age: 59
End: 2024-04-19
Payer: MEDICARE

## 2024-04-23 ENCOUNTER — TELEPHONE (OUTPATIENT)
Dept: PREADMISSION TESTING | Facility: HOSPITAL | Age: 59
End: 2024-04-23
Payer: MEDICARE

## 2024-04-23 DIAGNOSIS — E11.65 TYPE 2 DIABETES MELLITUS WITH HYPERGLYCEMIA, WITH LONG-TERM CURRENT USE OF INSULIN: ICD-10-CM

## 2024-04-23 DIAGNOSIS — Z01.818 PRE-OP EVALUATION: Primary | ICD-10-CM

## 2024-04-23 DIAGNOSIS — I10 ESSENTIAL HYPERTENSION: ICD-10-CM

## 2024-04-23 DIAGNOSIS — Z79.4 TYPE 2 DIABETES MELLITUS WITH HYPERGLYCEMIA, WITH LONG-TERM CURRENT USE OF INSULIN: ICD-10-CM

## 2024-04-29 DIAGNOSIS — Z96.642 S/P TOTAL LEFT HIP ARTHROPLASTY: Primary | ICD-10-CM

## 2024-05-03 ENCOUNTER — TELEPHONE (OUTPATIENT)
Dept: ORTHOPEDICS | Facility: CLINIC | Age: 59
End: 2024-05-03
Payer: MEDICARE

## 2024-06-03 ENCOUNTER — TELEPHONE (OUTPATIENT)
Dept: ORTHOPEDICS | Facility: CLINIC | Age: 59
End: 2024-06-03
Payer: MEDICARE

## 2024-06-03 NOTE — TELEPHONE ENCOUNTER
----- Message from Sohail Jasmine sent at 6/3/2024  3:46 PM CDT -----  Contact: Pt  .Type:  Sooner Apoointment Request    Caller is requesting a sooner appointment.  Caller declined first available appointment listed below.  Caller will not accept being placed on the waitlist and is requesting a message be sent to doctor.  Name of Caller:pt  When is the first available appointment?07/31/2024  Symptoms:Primary osteoarthritis of left hip [M16.12]  Would the patient rather a call back or a response via [facility]?  Call back  Best Call Back Number:[phone]  Additional Information:  Pt. Is calling to see if he can get his surgery date push up  because he is in so much pain.

## 2024-06-04 ENCOUNTER — TELEPHONE (OUTPATIENT)
Dept: ORTHOPEDICS | Facility: CLINIC | Age: 59
End: 2024-06-04
Payer: MEDICARE

## 2024-06-04 NOTE — TELEPHONE ENCOUNTER
----- Message from Wilfrid Miller sent at 6/4/2024  7:28 AM CDT -----  .Type:  Patient Returning Call    Who Called:pt  Who Left Message for Patient:Anastacia Mancia.  Does the patient know what this is regarding?:surgery   Would the patient rather a call back or a response via [facility]? call  Best Call Back Number: [phone]  Additional Information:

## 2024-06-05 ENCOUNTER — TELEPHONE (OUTPATIENT)
Dept: ORTHOPEDICS | Facility: CLINIC | Age: 59
End: 2024-06-05
Payer: MEDICARE

## 2024-06-05 NOTE — TELEPHONE ENCOUNTER
Spoke with Julia Osvaldo, surgery has been rescheduled to a sooner date- 07/03. Message sent to surgery team. Appointment has been updated.

## 2024-06-05 NOTE — TELEPHONE ENCOUNTER
Spoke with Mr. Riley- surgery with Dr Holguin has been rescheduled to 07/03. Message has been sent to surgery team.

## 2024-06-05 NOTE — TELEPHONE ENCOUNTER
----- Message from Anastacia Mancia LPN sent at 6/4/2024  4:49 PM CDT -----  Mohsen Jennings,     Please advise. Is July 3rd still available to move this patient up?     Thanks!  ----- Message -----  From: Shanda Yang  Sent: 6/4/2024   4:14 PM CDT  To: Chelsie Lundberg Staff    Type:  Patient Returning Call    Who Called:pt  Who Left Message for Patient:office  Does the patient know what this is regarding?:surgery  Would the patient rather a call back or a response via [facility]? call  Best Call Back Number:[phone]   Additional Information:
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
## 2024-06-11 ENCOUNTER — HOSPITAL ENCOUNTER (OUTPATIENT)
Dept: RADIOLOGY | Facility: HOSPITAL | Age: 59
Discharge: HOME | End: 2024-06-11
Attending: ORTHOPAEDIC SURGERY
Payer: MEDICARE

## 2024-06-11 ENCOUNTER — OFFICE VISIT (OUTPATIENT)
Dept: ORTHOPEDICS | Facility: CLINIC | Age: 59
End: 2024-06-11
Payer: MEDICARE

## 2024-06-11 ENCOUNTER — CLINICAL SUPPORT (OUTPATIENT)
Dept: LAB | Facility: HOSPITAL | Age: 59
End: 2024-06-11
Attending: ORTHOPAEDIC SURGERY
Payer: MEDICARE

## 2024-06-11 VITALS
BODY MASS INDEX: 44.1 KG/M2 | HEART RATE: 78 BPM | SYSTOLIC BLOOD PRESSURE: 130 MMHG | HEIGHT: 71 IN | WEIGHT: 315 LBS | DIASTOLIC BLOOD PRESSURE: 77 MMHG

## 2024-06-11 DIAGNOSIS — M16.12 PRIMARY OSTEOARTHRITIS OF LEFT HIP: Primary | ICD-10-CM

## 2024-06-11 DIAGNOSIS — Z01.818 PREOPERATIVE EXAMINATION: ICD-10-CM

## 2024-06-11 DIAGNOSIS — Z96.642 S/P TOTAL LEFT HIP ARTHROPLASTY: ICD-10-CM

## 2024-06-11 DIAGNOSIS — Z41.9 ELECTIVE SURGERY: ICD-10-CM

## 2024-06-11 DIAGNOSIS — M16.12 PRIMARY OSTEOARTHRITIS OF LEFT HIP: ICD-10-CM

## 2024-06-11 PROCEDURE — 3075F SYST BP GE 130 - 139MM HG: CPT | Mod: CPTII,S$GLB,, | Performed by: PHYSICIAN ASSISTANT

## 2024-06-11 PROCEDURE — 73502 X-RAY EXAM HIP UNI 2-3 VIEWS: CPT | Mod: TC,PN,LT

## 2024-06-11 PROCEDURE — 1159F MED LIST DOCD IN RCRD: CPT | Mod: CPTII,S$GLB,, | Performed by: PHYSICIAN ASSISTANT

## 2024-06-11 PROCEDURE — 73502 X-RAY EXAM HIP UNI 2-3 VIEWS: CPT | Mod: 26,LT,, | Performed by: RADIOLOGY

## 2024-06-11 PROCEDURE — 99213 OFFICE O/P EST LOW 20 MIN: CPT | Mod: S$GLB,,, | Performed by: PHYSICIAN ASSISTANT

## 2024-06-11 PROCEDURE — 3008F BODY MASS INDEX DOCD: CPT | Mod: CPTII,S$GLB,, | Performed by: PHYSICIAN ASSISTANT

## 2024-06-11 PROCEDURE — 93005 ELECTROCARDIOGRAM TRACING: CPT

## 2024-06-11 PROCEDURE — 3078F DIAST BP <80 MM HG: CPT | Mod: CPTII,S$GLB,, | Performed by: PHYSICIAN ASSISTANT

## 2024-06-11 PROCEDURE — 93010 ELECTROCARDIOGRAM REPORT: CPT | Mod: ,,, | Performed by: INTERNAL MEDICINE

## 2024-06-11 PROCEDURE — 99999 PR PBB SHADOW E&M-EST. PATIENT-LVL IV: CPT | Mod: PBBFAC,,, | Performed by: PHYSICIAN ASSISTANT

## 2024-06-11 PROCEDURE — 1160F RVW MEDS BY RX/DR IN RCRD: CPT | Mod: CPTII,S$GLB,, | Performed by: PHYSICIAN ASSISTANT

## 2024-06-11 RX ORDER — INSULIN GLARGINE 100 [IU]/ML
INJECTION, SOLUTION SUBCUTANEOUS
COMMUNITY
Start: 2023-10-23

## 2024-06-11 RX ORDER — DAPAGLIFLOZIN 10 MG/1
TABLET, FILM COATED ORAL
COMMUNITY

## 2024-06-12 LAB
OHS QRS DURATION: 128 MS
OHS QTC CALCULATION: 467 MS

## 2024-06-19 ENCOUNTER — HOSPITAL ENCOUNTER (OUTPATIENT)
Dept: PREADMISSION TESTING | Facility: HOSPITAL | Age: 59
Discharge: HOME | End: 2024-06-19
Attending: NURSE PRACTITIONER
Payer: MEDICARE

## 2024-06-19 ENCOUNTER — CLINICAL SUPPORT (OUTPATIENT)
Dept: LAB | Facility: HOSPITAL | Age: 59
End: 2024-06-19
Attending: NURSE PRACTITIONER
Payer: MEDICARE

## 2024-06-19 VITALS
BODY MASS INDEX: 44.1 KG/M2 | SYSTOLIC BLOOD PRESSURE: 144 MMHG | WEIGHT: 315 LBS | HEART RATE: 80 BPM | RESPIRATION RATE: 16 BRPM | DIASTOLIC BLOOD PRESSURE: 87 MMHG | TEMPERATURE: 98 F | OXYGEN SATURATION: 99 % | HEIGHT: 71 IN

## 2024-06-19 DIAGNOSIS — Z01.818 PRE-OP EVALUATION: ICD-10-CM

## 2024-06-19 DIAGNOSIS — E11.65 TYPE 2 DIABETES MELLITUS WITH HYPERGLYCEMIA, WITH LONG-TERM CURRENT USE OF INSULIN: ICD-10-CM

## 2024-06-19 DIAGNOSIS — I10 ESSENTIAL HYPERTENSION: ICD-10-CM

## 2024-06-19 DIAGNOSIS — Z79.4 TYPE 2 DIABETES MELLITUS WITH HYPERGLYCEMIA, WITH LONG-TERM CURRENT USE OF INSULIN: ICD-10-CM

## 2024-06-19 LAB
OHS QRS DURATION: 126 MS
OHS QTC CALCULATION: 468 MS

## 2024-06-19 PROCEDURE — 93010 ELECTROCARDIOGRAM REPORT: CPT | Mod: ,,, | Performed by: STUDENT IN AN ORGANIZED HEALTH CARE EDUCATION/TRAINING PROGRAM

## 2024-06-19 PROCEDURE — 93005 ELECTROCARDIOGRAM TRACING: CPT

## 2024-06-19 RX ORDER — LIRAGLUTIDE 6 MG/ML
0.6 INJECTION SUBCUTANEOUS DAILY
COMMUNITY

## 2024-06-19 NOTE — DISCHARGE INSTRUCTIONS

## 2024-06-19 NOTE — PRE-PROCEDURE INSTRUCTIONS
Wife.      Allergies, medical, surgical, family and psychosocial histories reviewed with patient. Periop plan of care reviewed. Preop instructions given, including medications to take and to hold. Hibiclens soap and instructions on use given. Time allotted for questions to be addressed.      Arrival time 1115.

## 2024-06-24 ENCOUNTER — TELEPHONE (OUTPATIENT)
Dept: ORTHOPEDICS | Facility: CLINIC | Age: 59
End: 2024-06-24
Payer: MEDICARE

## 2024-06-24 NOTE — TELEPHONE ENCOUNTER
----- Message from Shanda Yang sent at 6/24/2024 10:34 AM CDT -----  Type:  Patient Returning Call    Who Called:pt  Who Left Message for Patient:shyam  Does the patient know what this is regarding?:his surgery   Would the patient rather a call back or a response via [facility]? call  Best Call Back Number:[phone]  Additional Information:

## 2024-07-01 ENCOUNTER — TELEPHONE (OUTPATIENT)
Dept: ORTHOPEDICS | Facility: CLINIC | Age: 59
End: 2024-07-01
Payer: MEDICARE

## 2024-07-01 NOTE — TELEPHONE ENCOUNTER
----- Message from Simon Yang sent at 7/1/2024  9:10 AM CDT -----  Type: General Call Back     Name of Caller:pt wife  Reason pt cannot make appt for post op appt on 07/18 and would like to know if the appt can be moved to the next week   Would the patient rather a call back or a response via [facility]? call  Best Call Back Number:[phone]   Additional Information:

## 2024-07-01 NOTE — TELEPHONE ENCOUNTER
Spoke with Mr. Riley, patient will keep scheduled post op for now. As he may have transportation issues, he will follow up following surgery.

## 2024-07-03 ENCOUNTER — ANESTHESIA (OUTPATIENT)
Dept: SURGERY | Facility: HOSPITAL | Age: 59
End: 2024-07-03
Payer: MEDICARE

## 2024-07-03 ENCOUNTER — HOSPITAL ENCOUNTER (OUTPATIENT)
Facility: HOSPITAL | Age: 59
Discharge: HOME | End: 2024-07-08
Attending: ORTHOPAEDIC SURGERY | Admitting: ORTHOPAEDIC SURGERY
Payer: MEDICARE

## 2024-07-03 ENCOUNTER — ANESTHESIA EVENT (OUTPATIENT)
Dept: SURGERY | Facility: HOSPITAL | Age: 59
End: 2024-07-03
Payer: MEDICARE

## 2024-07-03 DIAGNOSIS — Z96.642 HISTORY OF LEFT HIP REPLACEMENT: ICD-10-CM

## 2024-07-03 DIAGNOSIS — M16.12 PRIMARY OSTEOARTHRITIS OF LEFT HIP: Primary | ICD-10-CM

## 2024-07-03 DIAGNOSIS — M16.12 PRIMARY OSTEOARTHRITIS OF LEFT HIP: ICD-10-CM

## 2024-07-03 DIAGNOSIS — Z96.642 S/P TOTAL LEFT HIP ARTHROPLASTY: ICD-10-CM

## 2024-07-03 DIAGNOSIS — G89.18 POSTOPERATIVE PAIN: Primary | ICD-10-CM

## 2024-07-03 LAB
POCT GLUCOSE: 194 MG/DL (ref 70–110)
POCT GLUCOSE: 230 MG/DL (ref 70–110)

## 2024-07-03 PROCEDURE — 25000003 PHARM REV CODE 250: Performed by: ORTHOPAEDIC SURGERY

## 2024-07-03 PROCEDURE — 37000008 HC ANESTHESIA 1ST 15 MINUTES: Performed by: ORTHOPAEDIC SURGERY

## 2024-07-03 PROCEDURE — 27000221 HC OXYGEN, UP TO 24 HOURS

## 2024-07-03 PROCEDURE — A4216 STERILE WATER/SALINE, 10 ML: HCPCS | Performed by: ORTHOPAEDIC SURGERY

## 2024-07-03 PROCEDURE — 63600175 PHARM REV CODE 636 W HCPCS: Performed by: ORTHOPAEDIC SURGERY

## 2024-07-03 PROCEDURE — 71000033 HC RECOVERY, INTIAL HOUR: Performed by: ORTHOPAEDIC SURGERY

## 2024-07-03 PROCEDURE — 94761 N-INVAS EAR/PLS OXIMETRY MLT: CPT

## 2024-07-03 PROCEDURE — 36000710: Performed by: ORTHOPAEDIC SURGERY

## 2024-07-03 PROCEDURE — 25000003 PHARM REV CODE 250

## 2024-07-03 PROCEDURE — 37000009 HC ANESTHESIA EA ADD 15 MINS: Performed by: ORTHOPAEDIC SURGERY

## 2024-07-03 PROCEDURE — P9045 ALBUMIN (HUMAN), 5%, 250 ML: HCPCS | Mod: JZ,JG | Performed by: NURSE ANESTHETIST, CERTIFIED REGISTERED

## 2024-07-03 PROCEDURE — 99900035 HC TECH TIME PER 15 MIN (STAT)

## 2024-07-03 PROCEDURE — 25000003 PHARM REV CODE 250: Performed by: NURSE ANESTHETIST, CERTIFIED REGISTERED

## 2024-07-03 PROCEDURE — 27201423 OPTIME MED/SURG SUP & DEVICES STERILE SUPPLY: Performed by: ORTHOPAEDIC SURGERY

## 2024-07-03 PROCEDURE — 36000711: Performed by: ORTHOPAEDIC SURGERY

## 2024-07-03 PROCEDURE — 71000039 HC RECOVERY, EACH ADD'L HOUR: Performed by: ORTHOPAEDIC SURGERY

## 2024-07-03 PROCEDURE — 63600175 PHARM REV CODE 636 W HCPCS: Mod: JZ,JG | Performed by: NURSE ANESTHETIST, CERTIFIED REGISTERED

## 2024-07-03 PROCEDURE — 25000003 PHARM REV CODE 250: Performed by: ANESTHESIOLOGY

## 2024-07-03 PROCEDURE — 27130 TOTAL HIP ARTHROPLASTY: CPT | Mod: LT,,, | Performed by: ORTHOPAEDIC SURGERY

## 2024-07-03 PROCEDURE — 63600175 PHARM REV CODE 636 W HCPCS: Performed by: NURSE ANESTHETIST, CERTIFIED REGISTERED

## 2024-07-03 PROCEDURE — C1776 JOINT DEVICE (IMPLANTABLE): HCPCS | Performed by: ORTHOPAEDIC SURGERY

## 2024-07-03 DEVICE — 127 DEGREE NECK ANGLE HIP STEM
Type: IMPLANTABLE DEVICE | Site: HIP | Status: FUNCTIONAL
Brand: ACCOLADE

## 2024-07-03 DEVICE — V40 FEMORAL HEAD
Type: IMPLANTABLE DEVICE | Site: HIP | Status: FUNCTIONAL
Brand: LFIT

## 2024-07-03 DEVICE — TRIDENT II TRITANIUM CLUSTER 56F
Type: IMPLANTABLE DEVICE | Site: HIP | Status: FUNCTIONAL
Brand: TRIDENT II

## 2024-07-03 DEVICE — TRIDENT X3 10 DEGREE POLYETHYLENE INSERT
Type: IMPLANTABLE DEVICE | Site: HIP | Status: FUNCTIONAL
Brand: TRIDENT X3 INSERT

## 2024-07-03 RX ORDER — KETAMINE HCL IN 0.9 % NACL 50 MG/5 ML
SYRINGE (ML) INTRAVENOUS
Status: DISCONTINUED | OUTPATIENT
Start: 2024-07-03 | End: 2024-07-03

## 2024-07-03 RX ORDER — LOSARTAN POTASSIUM 50 MG/1
100 TABLET ORAL DAILY
Status: DISCONTINUED | OUTPATIENT
Start: 2024-07-04 | End: 2024-07-08 | Stop reason: HOSPADM

## 2024-07-03 RX ORDER — ACETAMINOPHEN 500 MG
1000 TABLET ORAL
Status: COMPLETED | OUTPATIENT
Start: 2024-07-03 | End: 2024-07-03

## 2024-07-03 RX ORDER — LACTULOSE 10 G/15ML
20 SOLUTION ORAL EVERY 6 HOURS PRN
Status: DISCONTINUED | OUTPATIENT
Start: 2024-07-03 | End: 2024-07-08 | Stop reason: HOSPADM

## 2024-07-03 RX ORDER — OXYCODONE AND ACETAMINOPHEN 5; 325 MG/1; MG/1
1 TABLET ORAL EVERY 6 HOURS PRN
Qty: 45 TABLET | Refills: 0 | Status: SHIPPED | OUTPATIENT
Start: 2024-07-03

## 2024-07-03 RX ORDER — FENTANYL CITRATE 50 UG/ML
INJECTION, SOLUTION INTRAMUSCULAR; INTRAVENOUS
Status: DISCONTINUED | OUTPATIENT
Start: 2024-07-03 | End: 2024-07-03

## 2024-07-03 RX ORDER — GABAPENTIN 300 MG/1
300 CAPSULE ORAL NIGHTLY
Status: DISCONTINUED | OUTPATIENT
Start: 2024-07-03 | End: 2024-07-08 | Stop reason: HOSPADM

## 2024-07-03 RX ORDER — FAMOTIDINE 20 MG/1
20 TABLET, FILM COATED ORAL DAILY
Status: DISCONTINUED | OUTPATIENT
Start: 2024-07-04 | End: 2024-07-08 | Stop reason: HOSPADM

## 2024-07-03 RX ORDER — BISACODYL 10 MG/1
10 SUPPOSITORY RECTAL DAILY PRN
Status: DISCONTINUED | OUTPATIENT
Start: 2024-07-03 | End: 2024-07-08 | Stop reason: HOSPADM

## 2024-07-03 RX ORDER — ACETAMINOPHEN 500 MG
1000 TABLET ORAL 3 TIMES DAILY
Status: DISCONTINUED | OUTPATIENT
Start: 2024-07-03 | End: 2024-07-08 | Stop reason: HOSPADM

## 2024-07-03 RX ORDER — INSULIN ASPART 100 [IU]/ML
0-5 INJECTION, SOLUTION INTRAVENOUS; SUBCUTANEOUS
Status: DISCONTINUED | OUTPATIENT
Start: 2024-07-03 | End: 2024-07-08 | Stop reason: HOSPADM

## 2024-07-03 RX ORDER — DEXAMETHASONE SODIUM PHOSPHATE 4 MG/ML
INJECTION, SOLUTION INTRA-ARTICULAR; INTRALESIONAL; INTRAMUSCULAR; INTRAVENOUS; SOFT TISSUE
Status: DISCONTINUED | OUTPATIENT
Start: 2024-07-03 | End: 2024-07-03

## 2024-07-03 RX ORDER — AMOXICILLIN 250 MG
1 CAPSULE ORAL 2 TIMES DAILY
Status: DISCONTINUED | OUTPATIENT
Start: 2024-07-03 | End: 2024-07-08 | Stop reason: HOSPADM

## 2024-07-03 RX ORDER — ONDANSETRON 4 MG/1
4 TABLET, ORALLY DISINTEGRATING ORAL EVERY 6 HOURS PRN
Status: DISCONTINUED | OUTPATIENT
Start: 2024-07-03 | End: 2024-07-08 | Stop reason: HOSPADM

## 2024-07-03 RX ORDER — SUCCINYLCHOLINE CHLORIDE 20 MG/ML
INJECTION INTRAMUSCULAR; INTRAVENOUS
Status: DISCONTINUED | OUTPATIENT
Start: 2024-07-03 | End: 2024-07-03

## 2024-07-03 RX ORDER — PROCHLORPERAZINE EDISYLATE 5 MG/ML
5 INJECTION INTRAMUSCULAR; INTRAVENOUS EVERY 30 MIN PRN
Status: DISCONTINUED | OUTPATIENT
Start: 2024-07-03 | End: 2024-07-03 | Stop reason: HOSPADM

## 2024-07-03 RX ORDER — SPIRONOLACTONE 25 MG/1
50 TABLET ORAL DAILY
Status: DISCONTINUED | OUTPATIENT
Start: 2024-07-04 | End: 2024-07-08 | Stop reason: HOSPADM

## 2024-07-03 RX ORDER — OXYCODONE HYDROCHLORIDE 5 MG/1
5 TABLET ORAL
Status: DISCONTINUED | OUTPATIENT
Start: 2024-07-03 | End: 2024-07-08 | Stop reason: HOSPADM

## 2024-07-03 RX ORDER — GLUCAGON 1 MG
1 KIT INJECTION
Status: DISCONTINUED | OUTPATIENT
Start: 2024-07-03 | End: 2024-07-08 | Stop reason: HOSPADM

## 2024-07-03 RX ORDER — TRANEXAMIC ACID 10 MG/ML
1000 INJECTION, SOLUTION INTRAVENOUS
Status: COMPLETED | OUTPATIENT
Start: 2024-07-03 | End: 2024-07-03

## 2024-07-03 RX ORDER — OXYCODONE HYDROCHLORIDE 5 MG/1
10 TABLET ORAL
Status: DISCONTINUED | OUTPATIENT
Start: 2024-07-03 | End: 2024-07-08 | Stop reason: HOSPADM

## 2024-07-03 RX ORDER — NAPROXEN 500 MG/1
500 TABLET ORAL
Status: COMPLETED | OUTPATIENT
Start: 2024-07-03 | End: 2024-07-03

## 2024-07-03 RX ORDER — ZOLPIDEM TARTRATE 5 MG/1
5 TABLET ORAL NIGHTLY PRN
Status: DISCONTINUED | OUTPATIENT
Start: 2024-07-03 | End: 2024-07-08 | Stop reason: HOSPADM

## 2024-07-03 RX ORDER — ONDANSETRON HYDROCHLORIDE 2 MG/ML
INJECTION, SOLUTION INTRAVENOUS
Status: DISCONTINUED | OUTPATIENT
Start: 2024-07-03 | End: 2024-07-03

## 2024-07-03 RX ORDER — FUROSEMIDE 40 MG/1
40 TABLET ORAL DAILY
Status: DISCONTINUED | OUTPATIENT
Start: 2024-07-04 | End: 2024-07-08 | Stop reason: HOSPADM

## 2024-07-03 RX ORDER — OXYCODONE HYDROCHLORIDE 5 MG/1
5 TABLET ORAL
Status: DISCONTINUED | OUTPATIENT
Start: 2024-07-03 | End: 2024-07-03 | Stop reason: HOSPADM

## 2024-07-03 RX ORDER — PRAVASTATIN SODIUM 40 MG/1
40 TABLET ORAL DAILY
Status: DISCONTINUED | OUTPATIENT
Start: 2024-07-04 | End: 2024-07-08 | Stop reason: HOSPADM

## 2024-07-03 RX ORDER — ONDANSETRON HYDROCHLORIDE 2 MG/ML
4 INJECTION, SOLUTION INTRAVENOUS ONCE AS NEEDED
Status: DISCONTINUED | OUTPATIENT
Start: 2024-07-03 | End: 2024-07-03 | Stop reason: HOSPADM

## 2024-07-03 RX ORDER — IBUPROFEN 200 MG
16 TABLET ORAL
Status: DISCONTINUED | OUTPATIENT
Start: 2024-07-03 | End: 2024-07-08 | Stop reason: HOSPADM

## 2024-07-03 RX ORDER — IBUPROFEN 200 MG
24 TABLET ORAL
Status: DISCONTINUED | OUTPATIENT
Start: 2024-07-03 | End: 2024-07-08 | Stop reason: HOSPADM

## 2024-07-03 RX ORDER — MUPIROCIN 20 MG/G
OINTMENT TOPICAL
Status: DISCONTINUED | OUTPATIENT
Start: 2024-07-03 | End: 2024-07-03 | Stop reason: HOSPADM

## 2024-07-03 RX ORDER — VASOPRESSIN 20 [USP'U]/ML
INJECTION, SOLUTION INTRAMUSCULAR; SUBCUTANEOUS
Status: DISCONTINUED | OUTPATIENT
Start: 2024-07-03 | End: 2024-07-03

## 2024-07-03 RX ORDER — HYDROMORPHONE HYDROCHLORIDE 2 MG/ML
0.2 INJECTION, SOLUTION INTRAMUSCULAR; INTRAVENOUS; SUBCUTANEOUS EVERY 5 MIN PRN
Status: DISCONTINUED | OUTPATIENT
Start: 2024-07-03 | End: 2024-07-03 | Stop reason: HOSPADM

## 2024-07-03 RX ORDER — LIDOCAINE HYDROCHLORIDE 20 MG/ML
INJECTION INTRAVENOUS
Status: DISCONTINUED | OUTPATIENT
Start: 2024-07-03 | End: 2024-07-03

## 2024-07-03 RX ORDER — HYDROMORPHONE HYDROCHLORIDE 1 MG/ML
0.5 INJECTION, SOLUTION INTRAMUSCULAR; INTRAVENOUS; SUBCUTANEOUS EVERY 6 HOURS PRN
Status: DISCONTINUED | OUTPATIENT
Start: 2024-07-03 | End: 2024-07-08 | Stop reason: HOSPADM

## 2024-07-03 RX ORDER — MIDAZOLAM HYDROCHLORIDE 1 MG/ML
INJECTION, SOLUTION INTRAMUSCULAR; INTRAVENOUS
Status: DISCONTINUED | OUTPATIENT
Start: 2024-07-03 | End: 2024-07-03

## 2024-07-03 RX ORDER — HYDROMORPHONE HYDROCHLORIDE 2 MG/ML
INJECTION, SOLUTION INTRAMUSCULAR; INTRAVENOUS; SUBCUTANEOUS
Status: DISCONTINUED | OUTPATIENT
Start: 2024-07-03 | End: 2024-07-03

## 2024-07-03 RX ORDER — ONDANSETRON HYDROCHLORIDE 2 MG/ML
4 INJECTION, SOLUTION INTRAVENOUS EVERY 6 HOURS PRN
Status: DISCONTINUED | OUTPATIENT
Start: 2024-07-03 | End: 2024-07-08 | Stop reason: HOSPADM

## 2024-07-03 RX ORDER — SODIUM CHLORIDE 9 MG/ML
INJECTION, SOLUTION INTRAVENOUS CONTINUOUS
Status: DISCONTINUED | OUTPATIENT
Start: 2024-07-03 | End: 2024-07-04

## 2024-07-03 RX ORDER — DULOXETIN HYDROCHLORIDE 30 MG/1
30 CAPSULE, DELAYED RELEASE ORAL NIGHTLY
Status: DISCONTINUED | OUTPATIENT
Start: 2024-07-03 | End: 2024-07-08 | Stop reason: HOSPADM

## 2024-07-03 RX ORDER — KETOROLAC TROMETHAMINE 30 MG/ML
15 INJECTION, SOLUTION INTRAMUSCULAR; INTRAVENOUS EVERY 8 HOURS PRN
Status: DISCONTINUED | OUTPATIENT
Start: 2024-07-03 | End: 2024-07-03 | Stop reason: HOSPADM

## 2024-07-03 RX ORDER — SODIUM CHLORIDE 0.9 % (FLUSH) 0.9 %
3 SYRINGE (ML) INJECTION EVERY 8 HOURS
Status: DISCONTINUED | OUTPATIENT
Start: 2024-07-03 | End: 2024-07-08 | Stop reason: HOSPADM

## 2024-07-03 RX ORDER — ALBUMIN HUMAN 50 G/1000ML
SOLUTION INTRAVENOUS
Status: DISCONTINUED | OUTPATIENT
Start: 2024-07-03 | End: 2024-07-03

## 2024-07-03 RX ORDER — PROPOFOL 10 MG/ML
VIAL (ML) INTRAVENOUS
Status: DISCONTINUED | OUTPATIENT
Start: 2024-07-03 | End: 2024-07-03

## 2024-07-03 RX ORDER — TRANEXAMIC ACID
POWDER (GRAM) MISCELLANEOUS
Status: DISCONTINUED | OUTPATIENT
Start: 2024-07-03 | End: 2024-07-03 | Stop reason: HOSPADM

## 2024-07-03 RX ORDER — ROCURONIUM BROMIDE 10 MG/ML
INJECTION, SOLUTION INTRAVENOUS
Status: DISCONTINUED | OUTPATIENT
Start: 2024-07-03 | End: 2024-07-03

## 2024-07-03 RX ORDER — POLYETHYLENE GLYCOL 3350 17 G/17G
17 POWDER, FOR SOLUTION ORAL DAILY
Status: DISCONTINUED | OUTPATIENT
Start: 2024-07-04 | End: 2024-07-08 | Stop reason: HOSPADM

## 2024-07-03 RX ORDER — INSULIN GLARGINE 100 [IU]/ML
20 INJECTION, SOLUTION SUBCUTANEOUS 2 TIMES DAILY WITH MEALS
Status: DISCONTINUED | OUTPATIENT
Start: 2024-07-03 | End: 2024-07-08 | Stop reason: HOSPADM

## 2024-07-03 RX ORDER — SODIUM CHLORIDE 0.9 % (FLUSH) 0.9 %
5 SYRINGE (ML) INJECTION
Status: DISCONTINUED | OUTPATIENT
Start: 2024-07-03 | End: 2024-07-08 | Stop reason: HOSPADM

## 2024-07-03 RX ORDER — PHENYLEPHRINE HYDROCHLORIDE 10 MG/ML
INJECTION INTRAVENOUS
Status: DISCONTINUED | OUTPATIENT
Start: 2024-07-03 | End: 2024-07-03

## 2024-07-03 RX ORDER — ASPIRIN 81 MG/1
81 TABLET ORAL DAILY
Status: DISCONTINUED | OUTPATIENT
Start: 2024-07-04 | End: 2024-07-08 | Stop reason: HOSPADM

## 2024-07-03 RX ADMIN — PHENYLEPHRINE HYDROCHLORIDE 100 MCG: 10 INJECTION INTRAVENOUS at 12:07

## 2024-07-03 RX ADMIN — GABAPENTIN 300 MG: 300 CAPSULE ORAL at 08:07

## 2024-07-03 RX ADMIN — ROCURONIUM BROMIDE 10 MG: 10 INJECTION, SOLUTION INTRAVENOUS at 12:07

## 2024-07-03 RX ADMIN — PHENYLEPHRINE HYDROCHLORIDE 100 MCG: 10 INJECTION INTRAVENOUS at 11:07

## 2024-07-03 RX ADMIN — SODIUM CHLORIDE: 9 INJECTION, SOLUTION INTRAVENOUS at 06:07

## 2024-07-03 RX ADMIN — MIDAZOLAM 1 MG: 1 INJECTION INTRAMUSCULAR; INTRAVENOUS at 10:07

## 2024-07-03 RX ADMIN — Medication 20 MG: at 11:07

## 2024-07-03 RX ADMIN — SENNOSIDES AND DOCUSATE SODIUM 1 TABLET: 50; 8.6 TABLET ORAL at 08:07

## 2024-07-03 RX ADMIN — DEXAMETHASONE SODIUM PHOSPHATE 4 MG: 4 INJECTION, SOLUTION INTRA-ARTICULAR; INTRALESIONAL; INTRAMUSCULAR; INTRAVENOUS; SOFT TISSUE at 01:07

## 2024-07-03 RX ADMIN — Medication 30 MG: at 11:07

## 2024-07-03 RX ADMIN — PHENYLEPHRINE HYDROCHLORIDE 200 MCG: 10 INJECTION INTRAVENOUS at 11:07

## 2024-07-03 RX ADMIN — ROCURONIUM BROMIDE 25 MG: 10 INJECTION, SOLUTION INTRAVENOUS at 11:07

## 2024-07-03 RX ADMIN — ALBUMIN (HUMAN) 250 ML: 12.5 SOLUTION INTRAVENOUS at 12:07

## 2024-07-03 RX ADMIN — NAPROXEN 500 MG: 500 TABLET ORAL at 08:07

## 2024-07-03 RX ADMIN — DULOXETINE HYDROCHLORIDE 30 MG: 30 CAPSULE, DELAYED RELEASE ORAL at 08:07

## 2024-07-03 RX ADMIN — LIDOCAINE HYDROCHLORIDE 100 MG: 20 INJECTION, SOLUTION INTRAVENOUS at 10:07

## 2024-07-03 RX ADMIN — VASOPRESSIN 2 UNITS: 20 INJECTION INTRAVENOUS at 12:07

## 2024-07-03 RX ADMIN — HYDROMORPHONE HYDROCHLORIDE 0.2 MG: 2 INJECTION INTRAMUSCULAR; INTRAVENOUS; SUBCUTANEOUS at 01:07

## 2024-07-03 RX ADMIN — VASOPRESSIN 4 UNITS: 20 INJECTION INTRAVENOUS at 12:07

## 2024-07-03 RX ADMIN — SUCCINYLCHOLINE CHLORIDE 140 MG: 20 INJECTION, SOLUTION INTRAMUSCULAR; INTRAVENOUS at 10:07

## 2024-07-03 RX ADMIN — ROCURONIUM BROMIDE 15 MG: 10 INJECTION, SOLUTION INTRAVENOUS at 11:07

## 2024-07-03 RX ADMIN — MUPIROCIN: 20 OINTMENT TOPICAL at 08:07

## 2024-07-03 RX ADMIN — INSULIN GLARGINE 20 UNITS: 100 INJECTION, SOLUTION SUBCUTANEOUS at 07:07

## 2024-07-03 RX ADMIN — ACETAMINOPHEN 1000 MG: 500 TABLET ORAL at 08:07

## 2024-07-03 RX ADMIN — VASOPRESSIN 2 UNITS: 20 INJECTION INTRAVENOUS at 01:07

## 2024-07-03 RX ADMIN — FENTANYL CITRATE 50 MCG: 50 INJECTION INTRAMUSCULAR; INTRAVENOUS at 01:07

## 2024-07-03 RX ADMIN — SUGAMMADEX 200 MG: 100 INJECTION, SOLUTION INTRAVENOUS at 01:07

## 2024-07-03 RX ADMIN — VASOPRESSIN 3 UNITS: 20 INJECTION INTRAVENOUS at 12:07

## 2024-07-03 RX ADMIN — OXYCODONE 5 MG: 5 TABLET ORAL at 02:07

## 2024-07-03 RX ADMIN — ONDANSETRON 8 MG: 2 INJECTION, SOLUTION INTRAMUSCULAR; INTRAVENOUS at 01:07

## 2024-07-03 RX ADMIN — HYDROMORPHONE HYDROCHLORIDE 0.4 MG: 2 INJECTION INTRAMUSCULAR; INTRAVENOUS; SUBCUTANEOUS at 01:07

## 2024-07-03 RX ADMIN — PROPOFOL 160 MG: 10 INJECTION, EMULSION INTRAVENOUS at 10:07

## 2024-07-03 RX ADMIN — VASOPRESSIN 1 UNITS: 20 INJECTION INTRAVENOUS at 12:07

## 2024-07-03 RX ADMIN — TRANEXAMIC ACID 1000 MG: 10 INJECTION, SOLUTION INTRAVENOUS at 10:07

## 2024-07-03 RX ADMIN — INSULIN ASPART 1 UNITS: 100 INJECTION, SOLUTION INTRAVENOUS; SUBCUTANEOUS at 08:07

## 2024-07-03 RX ADMIN — PHENYLEPHRINE HYDROCHLORIDE 200 MCG: 10 INJECTION INTRAVENOUS at 12:07

## 2024-07-03 RX ADMIN — SODIUM CHLORIDE, SODIUM LACTATE, POTASSIUM CHLORIDE, AND CALCIUM CHLORIDE: .6; .31; .03; .02 INJECTION, SOLUTION INTRAVENOUS at 10:07

## 2024-07-03 RX ADMIN — ALBUMIN (HUMAN) 250 ML: 12.5 SOLUTION INTRAVENOUS at 01:07

## 2024-07-03 RX ADMIN — ROCURONIUM BROMIDE 50 MG: 10 INJECTION, SOLUTION INTRAVENOUS at 10:07

## 2024-07-03 RX ADMIN — DEXTROSE MONOHYDRATE 3 G: 50 INJECTION, SOLUTION INTRAVENOUS at 06:07

## 2024-07-03 RX ADMIN — FENTANYL CITRATE 50 MCG: 50 INJECTION INTRAMUSCULAR; INTRAVENOUS at 10:07

## 2024-07-03 RX ADMIN — Medication 3 ML: at 08:07

## 2024-07-03 RX ADMIN — PHENYLEPHRINE HYDROCHLORIDE 200 MCG: 10 INJECTION INTRAVENOUS at 10:07
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
## 2024-07-03 NOTE — TRANSFER OF CARE
"Anesthesia Transfer of Care Note    Patient: Tadeo Riley    Procedure(s) Performed: Procedure(s) (LRB):  ARTHROPLASTY, HIP (Left)    Patient location: PACU    Transport from OR: Transported from OR on 6-10 L/min O2 by face mask with adequate spontaneous ventilation    Post pain: adequate analgesia    Post assessment: no apparent anesthetic complications    Post vital signs: stable    Level of consciousness: lethargic and awake    Nausea/Vomiting: no nausea/vomiting    Complications: none    Transfer of care protocol was followed      Last vitals: Visit Vitals  /74 (BP Location: Right arm, Patient Position: Lying)   Pulse 71   Temp 36.4 °C (97.5 °F) (Temporal)   Resp 18   Ht 5' 11" (1.803 m)   Wt (!) 145.6 kg (320 lb 15.8 oz)   SpO2 97%   BMI 44.77 kg/m²     "

## 2024-07-03 NOTE — PLAN OF CARE
07/03/24 1716   Admission   Initial VN Admission Questions Complete   Communication Issues? None   Shift   Virtual Nurse - Patient Verbalized Approval Of Camera Use   Safety/Activity   Patient Rounds visualized patient;placement of personal items at bedside;bed in low position;bed wheels locked;call light in patient/parent reach;clutter free environment maintained;ID band on

## 2024-07-03 NOTE — PT/OT/SLP PROGRESS
Occupational Therapy      Patient Name:  Tadeo Riley   MRN:  [id number]    Patient not seen today secondary to Pain, Other (Comment) (increased grogginess and unable to keep eyes open). Will follow-up as able on 7/4/2024.    7/3/2024

## 2024-07-03 NOTE — ANESTHESIA PROCEDURE NOTES
Intubation    Date/Time: 7/3/2024 10:28 AM    Performed by: Wendy Lozano CRNA  Authorized by: Key Wharton MD    Intubation:     Induction:  Intravenous    Intubated:  Postinduction    Mask Ventilation:  Not attempted    Attempts:  1    Attempted By:  Student    Method of Intubation:  Video laryngoscopy    Blade:  Goodson 4    Laryngeal View Grade: Grade I - full view of cords      Difficult Airway Encountered?: No      Complications:  None    Airway Device:  Oral endotracheal tube    Airway Device Size:  8.0    Style/Cuff Inflation:  Cuffed (inflated to minimal occlusive pressure)    Tube secured:  23    Secured at:  The lips    Placement Verified By:  Capnometry    Complicating Factors:  None and large/floppy epiglottis    Findings Post-Intubation:  BS equal bilateral and atraumatic/condition of teeth unchanged

## 2024-07-03 NOTE — OP NOTE
INDICATIONS/PREOPERATIVE DIAGNOSIS: Osteoarthritis of the left hip.    POSTOPERATIVE DIAGNOSIS: Same.    DATE OF SURGERY: 7/3/2024    NAME OF PROCEDURE: left total hip replacement.    SURGEON: Michael Holguin MD.    ASSISTANT: MD Vijay    IMPLANT SYSTEM: Willis Trident 2/MDM/Accolade 2    EBL:  600 cc.    DESCRIPTION OF PROCEDURE: The patient was taken to the Operating Room and given general anesthesia. Intravenous antibiotics were given. The patient was placed in the lateral decubitus position with an axillary roll in place. The limbs were carefully padded and supported. The hip was prepped and draped in the usual sterile fashion. A posterolateral skin incision was made. Sharp dissection was carried down to the fascia elmer. The fascia elmer and gluteus dai were incised The trochanteric bursa was excised. The abductor mechanism was retracted anteriorly. An incision was made between the piriformis and the gluteus minimus starting at the rim of the acetabulum, extending distally to the intertrochanteric ridge and then distally to the lesser trochanter creating a full-thickness posterior capsular/tendinous flap. This flap was retracted posteriorly, and the hip was dislocated. The femoral neck was cut 1/2 inch above the lesser trochanter according to the preoperative plan. The femur was retracted anteriorly. The acetabulum was debrided from its rim to its base of connective tissue. It was reamed to 55 mm. The 56 mm trial had excellent fit and stability. The final shell was seated using the guide.  No supplemental dome screws were used. The anterior rim was checked for osteophytes and all that were threatening to cause impingement were removed. The socket was irrigated. The MDM liner was snapped into place. It was carefully checked and found to be secure. The proximal femur was then exposed in the wound. It was entered with a box osteotome and a starting awl. It was then broached to a size 6 x 127° in 15 degrees of  anteversion. The calcar was planed. Trial reduction revealed excellent range of motion and stability with a +0 mm neck length. The final stem was seated. The neck was cleaned and dried. The MDM head was assembled and seated. The socket was irrigated. The hip was reduced. Range of motion and stability were excellent. The wound was irrigated.  The fascia was closed with strata fix, the deep tissues were injected with 1 gram of tranexamic acid. The subcutaneous tissue was closed with 2-0 Vicryl. The skin was closed with Monocryl and Prineo, and a occlusive plastic adhesive dressing was applied. There were no known complications.

## 2024-07-03 NOTE — PT/OT/SLP PROGRESS
Physical Therapy      Patient Name:  Tadeo Riley   MRN:  [id number]    Patient not seen today secondary to Other (Comment). PT/OT attempted to see pt once admitted to 5th floor post surgery; pt with increased drowsiness; unable to keep eyes open consistently due to fatigue- deeming pt not safe at this time to progress mobility OOB; pt reports pain 6/10 to L hip; as well as increased coldness-requesting to be covered in additional  blankets. Educated pt and wife present (due to pt's increased fatigue) to perform BLE ankle pumps throughout night for DVT prevention. Therapy will follow-up for complete evaluation on 7/4/24.     7/3/24  16:04-16:12

## 2024-07-03 NOTE — ANESTHESIA POSTPROCEDURE EVALUATION
Anesthesia Post Evaluation    Patient: Tadeo Riley    Procedure(s) Performed: Procedure(s) (LRB):  ARTHROPLASTY, HIP (Left)    Final Anesthesia Type: general      Patient location during evaluation: PACU  Patient participation: Yes- Able to Participate  Level of consciousness: awake and alert  Post-procedure vital signs: reviewed and stable  Pain management: adequate  Airway patency: patent    PONV status at discharge: No PONV  Anesthetic complications: no      Cardiovascular status: blood pressure returned to baseline  Respiratory status: unassisted  Hydration status: euvolemic  Follow-up not needed.          Vitals Value Taken Time   /69 07/03/24 1530   Temp 36.3 °C (97.4 °F) 07/03/24 1345   Pulse 77 07/03/24 1536   Resp 18 07/03/24 1530   SpO2 99 % 07/03/24 1536   Vitals shown include unfiled device data.      Event Time   Out of Recovery 15:48:28         Pain/Nathaniel Score: Pain Rating Prior to Med Admin: 6 (7/3/2024  3:30 PM)  Pain Rating Post Med Admin: 6 (7/3/2024  3:30 PM)  Nathaniel Score: 9 (7/3/2024  3:30 PM)

## 2024-07-04 LAB
ANION GAP SERPL CALC-SCNC: 10 MMOL/L (ref 8–16)
BUN SERPL-MCNC: 37 MG/DL (ref 6–20)
CALCIUM SERPL-MCNC: 8.7 MG/DL (ref 8.7–10.5)
CHLORIDE SERPL-SCNC: 106 MMOL/L (ref 95–110)
CO2 SERPL-SCNC: 21 MMOL/L (ref 23–29)
CREAT SERPL-MCNC: 1.8 MG/DL (ref 0.5–1.4)
EST. GFR  (NO RACE VARIABLE): 44 ML/MIN/1.73 M^2
GLUCOSE SERPL-MCNC: 240 MG/DL (ref 70–110)
HCT VFR BLD AUTO: 24.1 % (ref 40–54)
HGB BLD-MCNC: 8.5 G/DL (ref 14–18)
POCT GLUCOSE: 220 MG/DL (ref 70–110)
POCT GLUCOSE: 281 MG/DL (ref 70–110)
POCT GLUCOSE: 290 MG/DL (ref 70–110)
POCT GLUCOSE: 290 MG/DL (ref 70–110)
POCT GLUCOSE: 306 MG/DL (ref 70–110)
POTASSIUM SERPL-SCNC: 4.1 MMOL/L (ref 3.5–5.1)
SODIUM SERPL-SCNC: 137 MMOL/L (ref 136–145)

## 2024-07-04 PROCEDURE — 63600175 PHARM REV CODE 636 W HCPCS: Performed by: ORTHOPAEDIC SURGERY

## 2024-07-04 PROCEDURE — 97530 THERAPEUTIC ACTIVITIES: CPT

## 2024-07-04 PROCEDURE — 97161 PT EVAL LOW COMPLEX 20 MIN: CPT

## 2024-07-04 PROCEDURE — A4216 STERILE WATER/SALINE, 10 ML: HCPCS | Performed by: ORTHOPAEDIC SURGERY

## 2024-07-04 PROCEDURE — 80048 BASIC METABOLIC PNL TOTAL CA: CPT | Performed by: ORTHOPAEDIC SURGERY

## 2024-07-04 PROCEDURE — 97165 OT EVAL LOW COMPLEX 30 MIN: CPT

## 2024-07-04 PROCEDURE — 85018 HEMOGLOBIN: CPT | Performed by: ORTHOPAEDIC SURGERY

## 2024-07-04 PROCEDURE — 25000003 PHARM REV CODE 250: Performed by: ORTHOPAEDIC SURGERY

## 2024-07-04 PROCEDURE — 94761 N-INVAS EAR/PLS OXIMETRY MLT: CPT

## 2024-07-04 PROCEDURE — 85014 HEMATOCRIT: CPT | Performed by: ORTHOPAEDIC SURGERY

## 2024-07-04 PROCEDURE — 99900035 HC TECH TIME PER 15 MIN (STAT)

## 2024-07-04 PROCEDURE — 97535 SELF CARE MNGMENT TRAINING: CPT

## 2024-07-04 PROCEDURE — 36415 COLL VENOUS BLD VENIPUNCTURE: CPT | Performed by: ORTHOPAEDIC SURGERY

## 2024-07-04 RX ADMIN — OXYCODONE 10 MG: 5 TABLET ORAL at 08:07

## 2024-07-04 RX ADMIN — ACETAMINOPHEN 1000 MG: 500 TABLET ORAL at 03:07

## 2024-07-04 RX ADMIN — SPIRONOLACTONE 50 MG: 25 TABLET, FILM COATED ORAL at 08:07

## 2024-07-04 RX ADMIN — FAMOTIDINE 20 MG: 20 TABLET ORAL at 08:07

## 2024-07-04 RX ADMIN — FUROSEMIDE 40 MG: 40 TABLET ORAL at 08:07

## 2024-07-04 RX ADMIN — SENNOSIDES AND DOCUSATE SODIUM 1 TABLET: 50; 8.6 TABLET ORAL at 08:07

## 2024-07-04 RX ADMIN — GABAPENTIN 300 MG: 300 CAPSULE ORAL at 08:07

## 2024-07-04 RX ADMIN — ASPIRIN 81 MG: 81 TABLET, COATED ORAL at 08:07

## 2024-07-04 RX ADMIN — INSULIN GLARGINE 20 UNITS: 100 INJECTION, SOLUTION SUBCUTANEOUS at 08:07

## 2024-07-04 RX ADMIN — INSULIN GLARGINE 20 UNITS: 100 INJECTION, SOLUTION SUBCUTANEOUS at 04:07

## 2024-07-04 RX ADMIN — DULOXETINE HYDROCHLORIDE 30 MG: 30 CAPSULE, DELAYED RELEASE ORAL at 08:07

## 2024-07-04 RX ADMIN — INSULIN ASPART 3 UNITS: 100 INJECTION, SOLUTION INTRAVENOUS; SUBCUTANEOUS at 04:07

## 2024-07-04 RX ADMIN — ACETAMINOPHEN 1000 MG: 500 TABLET ORAL at 08:07

## 2024-07-04 RX ADMIN — DEXTROSE MONOHYDRATE 3 G: 50 INJECTION, SOLUTION INTRAVENOUS at 01:07

## 2024-07-04 RX ADMIN — PRAVASTATIN SODIUM 40 MG: 40 TABLET ORAL at 08:07

## 2024-07-04 RX ADMIN — ONDANSETRON 4 MG: 2 INJECTION INTRAMUSCULAR; INTRAVENOUS at 02:07

## 2024-07-04 RX ADMIN — INSULIN ASPART 3 UNITS: 100 INJECTION, SOLUTION INTRAVENOUS; SUBCUTANEOUS at 05:07

## 2024-07-04 RX ADMIN — LOSARTAN POTASSIUM 100 MG: 50 TABLET, FILM COATED ORAL at 08:07

## 2024-07-04 RX ADMIN — INSULIN ASPART 4 UNITS: 100 INJECTION, SOLUTION INTRAVENOUS; SUBCUTANEOUS at 11:07

## 2024-07-04 RX ADMIN — SODIUM CHLORIDE: 9 INJECTION, SOLUTION INTRAVENOUS at 05:07

## 2024-07-04 RX ADMIN — INSULIN ASPART 1 UNITS: 100 INJECTION, SOLUTION INTRAVENOUS; SUBCUTANEOUS at 08:07

## 2024-07-04 RX ADMIN — OXYCODONE 10 MG: 5 TABLET ORAL at 09:07

## 2024-07-04 RX ADMIN — Medication 3 ML: at 03:07

## 2024-07-04 RX ADMIN — POLYETHYLENE GLYCOL 3350 17 G: 17 POWDER, FOR SOLUTION ORAL at 08:07

## 2024-07-04 NOTE — DISCHARGE SUMMARY
DISCHARGE SUMMARY      Date and time of Admission: 7/3/2024  8:26 AM    Date of Discharge:7/4/2024    Discharge Diagnoses:    Active Hospital Problems    Diagnosis  POA    *History of left hip replacement [Z96.642]  Not Applicable      Resolved Hospital Problems   No resolved problems to display.       Discharge Medications:       Medication List        START taking these medications      oxyCODONE-acetaminophen 5-325 mg per tablet  Commonly known as: PERCOCET  Take 1 tablet by mouth every 6 (six) hours as needed for Pain.            CHANGE how you take these medications      insulin NPH-insulin regular (70/30) 100 unit/mL (70-30) injection  Commonly known as: NovoLIN 70/30 U-100 Insulin  Inject 50 units twice a day by subcutaneous route.  What changed:   how much to take  how to take this  when to take this            CONTINUE taking these medications      aspirin 81 MG EC tablet  Commonly known as: ECOTRIN     blood-glucose meter kit  Commonly known as: BLOOD GLUCOSE MONITORING  Use as instructed     furosemide 40 MG tablet  Commonly known as: LASIX     gabapentin 300 MG capsule  Commonly known as: NEURONTIN  Take 1-2 capsules (300-600 mg total) by mouth every evening.     JARDIANCE 25 mg tablet  Generic drug: empagliflozin     LANTUS U-100 INSULIN 100 unit/mL injection  Generic drug: insulin glargine U-100 (Lantus)     losartan-hydrochlorothiazide 100-25 mg 100-25 mg per tablet  Commonly known as: HYZAAR  Take 1 tablet by mouth once daily.     naproxen 500 MG tablet  Commonly known as: NAPROSYN  Take 1 tablet (500 mg total) by mouth 2 (two) times daily with meals.     pravastatin 40 MG tablet  Commonly known as: PRAVACHOL     spironolactone 50 MG tablet  Commonly known as: ALDACTONE     tadalafiL 10 MG tablet  Commonly known as: CIALIS     VICTOZA 2-DANIEL 0.6 mg/0.1 mL (18 mg/3 mL) Pnij pen  Generic drug: liraglutide 0.6 mg/0.1 mL (18 mg/3 mL) subq PNIJ     vitamin D 1000 units Tab  Commonly known as: VITAMIN D3             ASK your doctor about these medications      diclofenac sodium 1 % Gel  Commonly known as: VOLTAREN  Apply 2 g topically 4 (four) times daily.     DULoxetine 30 MG capsule  Commonly known as: CYMBALTA     FARXIGA 10 mg tablet  Generic drug: dapagliflozin propanediol               Where to Get Your Medications        These medications were sent to [facility]  [address], [place] [address]      Hours: Mon-Fri, 8a-5:30p Phone: [phone]   oxyCODONE-acetaminophen 5-325 mg per tablet         Follow-up (including scheduled tests):    History of Present Illness: See H&P    Past Medical History:    Past Medical History:   Diagnosis Date    CHF (congestive heart failure)     Diabetes mellitus     Hypertension     Obesity        Allergies: Metformin, Lisinopril, and Benadryl [diphenhydramine hcl]    Hospital Course:    The patient underwent surgery on the day of admission. The procedure was uneventful and the patient tolerated well. Post operatively the patient was hemodynamically stable and made appropriate progress in therapy. There were no evident acute postoperative complications.    Procedures:  Left total hip replacement    Discharge Physical Exam: See progress note    Condition: Improved    Activity: WBAT    Diet: Resume preadmission diet    Disposition: Home with services

## 2024-07-04 NOTE — PROGRESS NOTES
[facility] Surg  Orthopedics  Progress Note    Patient Name: Tadeo Riley  MRN: [id number]  Admission Date: 7/3/2024  Hospital Length of Stay: 0 days  Attending Provider: Michael Holguin MD  Primary Care Provider: Salazar Miranda MD  Follow-up For: Procedure(s) (LRB):  ARTHROPLASTY, HIP (Left)    Post-Operative Day: 1 Day Post-Op  Subjective:     Principal Problem:History of left hip replacement    Principal Orthopedic Problem:  Same    Interval History:  The patient reports that he is comfortable.  No specific complaints were reported.    Review of patient's allergies indicates:   Allergen Reactions    Metformin Hives    Lisinopril Other (See Comments)    Benadryl [diphenhydramine hcl] Rash       Current Facility-Administered Medications   Medication    acetaminophen tablet 1,000 mg    aspirin EC tablet 81 mg    bisacodyL suppository 10 mg    dextrose 10% bolus 125 mL 125 mL    dextrose 10% bolus 250 mL 250 mL    DULoxetine DR capsule 30 mg    famotidine tablet 20 mg    furosemide tablet 40 mg    gabapentin capsule 300 mg    glucagon (human recombinant) injection 1 mg    glucose chewable tablet 16 g    glucose chewable tablet 24 g    HYDROmorphone injection 0.5 mg    insulin aspart U-100 pen 0-5 Units    insulin glargine U-100 (Lantus) pen 20 Units    lactulose 20 gram/30 mL solution Soln 20 g    losartan tablet 100 mg    ondansetron disintegrating tablet 4 mg    ondansetron injection 4 mg    oxyCODONE immediate release tablet 10 mg    oxyCODONE immediate release tablet 5 mg    polyethylene glycol packet 17 g    pravastatin tablet 40 mg    senna-docusate 8.6-50 mg per tablet 1 tablet    sodium chloride 0.9% flush 3 mL    sodium chloride 0.9% flush 5 mL    spironolactone tablet 50 mg    zolpidem tablet 5 mg     Objective:     Vital Signs (Most Recent):  Temp: 99 °F (37.2 °C) (07/04/24 0736)  Pulse: 99 (07/04/24 0736)  Resp: 18 (07/04/24 0736)  BP: (!) 123/59 (07/04/24 0736)  SpO2: 95 % (07/04/24 0835) Vital  "Signs (24h Range):  Temp:  [97.4 °F (36.3 °C)-99 °F (37.2 °C)] 99 °F (37.2 °C)  Pulse:  [74-99] 99  Resp:  [12-20] 18  SpO2:  [93 %-100 %] 95 %  BP: (104-168)/(59-86) 123/59     Weight: (!) 157.5 kg (347 lb 3.6 oz)  Height: 5' 11" (180.3 cm)  Body mass index is 48.43 kg/m².      Intake/Output Summary (Last 24 hours) at 7/4/2024 0859  Last data filed at 7/4/2024 0536  Gross per 24 hour   Intake 3272.93 ml   Output 1900 ml   Net 1372.93 ml       Ortho/SPM Exam    Appears alert and comfortable  Dressing dry and intact  Left lower extremity neurovascular intact  Postop radiographs are satisfactory    Significant Labs: All pertinent labs within the past 24 hours have been reviewed.    Significant Imaging: I have reviewed all pertinent imaging results/findings.    Assessment/Plan:     Active Diagnoses:    Diagnosis Date Noted POA    PRINCIPAL PROBLEM:  History of left hip replacement [Z96.642] 07/03/2024 Not Applicable      Problems Resolved During this Admission:   The left total hip is normal for postop day 1.  The patient to ambulate with physical therapy and may be discharged home when walking safely    Postop anemia does not require treatment    Sliding scale ordered to cover blood sugar      Michael Holguin MD  Orthopedics  [facility] Surg  "

## 2024-07-04 NOTE — PLAN OF CARE
Pt clear to D/C home today with [facility]. Pt has rolling walker and DME already. Pt spouse Nyla to transport Pt home at 5pm. F/U appts have been scheduled. No other D/C needs identified. Pt clear from CM.     Future Appointments   Date Time Provider Department Center   7/25/2024  8:45 AM [facility] ORTHO CLINIC LIMIT 350LBS [facility] ORX[facility] [place] [facility]   7/25/2024  9:15 AM Michael Holguin MD [facility] ORTHO [place] [facility]         [place] [facility] Med Surg  Discharge Final Note    Primary Care Provider: Salazar Miranda MD    Expected Discharge Date: 7/3/2024    Final Discharge Note (most recent)       Final Note - 07/04/24 0830          Final Note    Assessment Type Final Discharge Note (P)      Anticipated Discharge Disposition Home-Health Care Svc (P)      What phone number can be called within the next 1-3 days to see how you are doing after discharge? [phone] (P)      Hospital Resources/Appts/Education Provided Appointments scheduled and added to AVS (P)         Post-Acute Status    Post-Acute Authorization HME;Home Health (P)      HME Status Set-up Complete/Auth obtained (P)      Home Health Status Set-up Complete/Auth obtained (P)      Discharge Delays None known at this time (P)                      Important Message from Medicare             Contact Info       Michael Holguin MD   Specialty: Orthopedic Surgery    [address]  [address]  [place] [address]   Phone: [phone]       Next Steps: Follow up in 2 week(s)              Basia Higuera LMSW  Phone: [phone]  [facility]
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
## 2024-07-04 NOTE — PLAN OF CARE
AAO,vss,poc reviewed,stated understanding,left hip aquacel dressing intact and dry,denies c/o numbness or tingling,abductor pillow is in place,scd in place,bed alarm is set.
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
## 2024-07-04 NOTE — CARE UPDATE
"Pt c/o nausea,states "I think my sugar is low." Blood sugar 290,cool cloth applied to forehead,prn zofran admin w relief voiced.  "

## 2024-07-04 NOTE — PLAN OF CARE
Problem: Occupational Therapy  Goal: Occupational Therapy Goal  Description: Goals to be met by: D/C     Patient will increase functional independence with ADLs by performing:    LE Dressing with Set-up Assistance and Assistive Devices as needed.  Grooming while seated with Set-up Assistance.  Toileting from toilet with Supervision for hygiene and clothing management.   Step transfer with Supervision  Toilet transfer to toilet with Supervision.    Outcome: Progressing

## 2024-07-04 NOTE — PLAN OF CARE
Problem: Physical Therapy  Goal: Physical Therapy Goal  Description: Goals to be met by: 2024     Patient will increase functional independence with mobility by performin. Supine to sit with Modified [place]  2. Sit to supine with Modified [place]  3. Sit to stand transfer with Modified [place]  4. Gait  x 150 feet with Modified [place] using Rolling Walker.   5. Increased functional strength to 5/5 for functional ambulation and transfers.    Outcome: Progressing     Patient was able to ambulate around the bed but fatigued very quickly and needed Min A with all activity for leg support, as he was still not able to fully move the surgical leg independently at this time. He reported that he was nervous to get home but has a good support system in place. He needed Min A to move his left from sup <> sit to maintain hip precautions. He was able to recall 2/3 posterior precautions and was able to recall 3/3 at end of session.

## 2024-07-04 NOTE — PLAN OF CARE
Problem: Hip Arthroplasty  Goal: Optimal Coping  Outcome: Progressing       Problem: Diabetes Comorbidity  Goal: Blood Glucose Level Within Targeted Range  Outcome: Progressing      AAO x4.  Diabetic diet.  PT/OT performed today.  Pain medication administered PRN.  Contacted Dr Holguin by phone to inform patient stated he does not feel fit to go home due to minimal progress with ambulation/weakness.  MD determined patient to remain and re-evaluate therapy in the morning.  Blood glucose monitoring.  Safety maintained.  SCD to right leg.  Encouraged to call for assistance.  Bed alarm on.

## 2024-07-04 NOTE — PLAN OF CARE
Problem: Adult Inpatient Plan of Care  Goal: Plan of Care Review  Outcome: Progressing  Goal: Optimal Comfort and Wellbeing  Outcome: Progressing  Goal: Readiness for Transition of Care  Outcome: Progressing     Problem: Wound  Goal: Optimal Coping  Outcome: Progressing

## 2024-07-04 NOTE — PLAN OF CARE
07/04/24 1423   Post-Acute Status   Post-Acute Authorization HME;Home Health   HME Status Set-up Complete/Auth obtained  (Pt has recommended DME from [facility])   Home Health Status Set-up Complete/Auth obtained  (Pt already est with Alexys/YESSI R[facility] from [facility].)   Discharge Delays (!) Personal Transportation  (Spouse to  at [place])   Discharge Plan   Discharge Plan A Home;Home Health

## 2024-07-05 LAB
POCT GLUCOSE: 233 MG/DL (ref 70–110)
POCT GLUCOSE: 266 MG/DL (ref 70–110)
POCT GLUCOSE: 266 MG/DL (ref 70–110)
POCT GLUCOSE: 282 MG/DL (ref 70–110)

## 2024-07-05 PROCEDURE — A4216 STERILE WATER/SALINE, 10 ML: HCPCS | Performed by: ORTHOPAEDIC SURGERY

## 2024-07-05 PROCEDURE — 94761 N-INVAS EAR/PLS OXIMETRY MLT: CPT

## 2024-07-05 PROCEDURE — 97535 SELF CARE MNGMENT TRAINING: CPT

## 2024-07-05 PROCEDURE — 97116 GAIT TRAINING THERAPY: CPT

## 2024-07-05 PROCEDURE — 25000003 PHARM REV CODE 250: Performed by: ORTHOPAEDIC SURGERY

## 2024-07-05 PROCEDURE — 63600175 PHARM REV CODE 636 W HCPCS: Performed by: ORTHOPAEDIC SURGERY

## 2024-07-05 PROCEDURE — 94760 N-INVAS EAR/PLS OXIMETRY 1: CPT

## 2024-07-05 PROCEDURE — 97530 THERAPEUTIC ACTIVITIES: CPT

## 2024-07-05 PROCEDURE — 97110 THERAPEUTIC EXERCISES: CPT

## 2024-07-05 RX ADMIN — SPIRONOLACTONE 50 MG: 25 TABLET, FILM COATED ORAL at 08:07

## 2024-07-05 RX ADMIN — INSULIN ASPART 2 UNITS: 100 INJECTION, SOLUTION INTRAVENOUS; SUBCUTANEOUS at 05:07

## 2024-07-05 RX ADMIN — FUROSEMIDE 40 MG: 40 TABLET ORAL at 08:07

## 2024-07-05 RX ADMIN — GABAPENTIN 300 MG: 300 CAPSULE ORAL at 09:07

## 2024-07-05 RX ADMIN — OXYCODONE 10 MG: 5 TABLET ORAL at 07:07

## 2024-07-05 RX ADMIN — INSULIN ASPART 3 UNITS: 100 INJECTION, SOLUTION INTRAVENOUS; SUBCUTANEOUS at 11:07

## 2024-07-05 RX ADMIN — OXYCODONE 10 MG: 5 TABLET ORAL at 08:07

## 2024-07-05 RX ADMIN — INSULIN GLARGINE 20 UNITS: 100 INJECTION, SOLUTION SUBCUTANEOUS at 08:07

## 2024-07-05 RX ADMIN — Medication 3 ML: at 05:07

## 2024-07-05 RX ADMIN — ONDANSETRON 4 MG: 2 INJECTION INTRAMUSCULAR; INTRAVENOUS at 08:07

## 2024-07-05 RX ADMIN — Medication 3 ML: at 02:07

## 2024-07-05 RX ADMIN — INSULIN GLARGINE 20 UNITS: 100 INJECTION, SOLUTION SUBCUTANEOUS at 04:07

## 2024-07-05 RX ADMIN — INSULIN ASPART 3 UNITS: 100 INJECTION, SOLUTION INTRAVENOUS; SUBCUTANEOUS at 04:07

## 2024-07-05 RX ADMIN — ACETAMINOPHEN 1000 MG: 500 TABLET ORAL at 02:07

## 2024-07-05 RX ADMIN — ACETAMINOPHEN 1000 MG: 500 TABLET ORAL at 09:07

## 2024-07-05 RX ADMIN — SENNOSIDES AND DOCUSATE SODIUM 1 TABLET: 50; 8.6 TABLET ORAL at 09:07

## 2024-07-05 RX ADMIN — ACETAMINOPHEN 1000 MG: 500 TABLET ORAL at 08:07

## 2024-07-05 RX ADMIN — INSULIN ASPART 1 UNITS: 100 INJECTION, SOLUTION INTRAVENOUS; SUBCUTANEOUS at 09:07

## 2024-07-05 RX ADMIN — ASPIRIN 81 MG: 81 TABLET, COATED ORAL at 08:07

## 2024-07-05 RX ADMIN — LOSARTAN POTASSIUM 100 MG: 50 TABLET, FILM COATED ORAL at 08:07

## 2024-07-05 RX ADMIN — DULOXETINE HYDROCHLORIDE 30 MG: 30 CAPSULE, DELAYED RELEASE ORAL at 09:07

## 2024-07-05 RX ADMIN — PRAVASTATIN SODIUM 40 MG: 40 TABLET ORAL at 08:07

## 2024-07-05 RX ADMIN — Medication 3 ML: at 09:07

## 2024-07-05 RX ADMIN — FAMOTIDINE 20 MG: 20 TABLET ORAL at 08:07

## 2024-07-05 NOTE — PLAN OF CARE
AAOx4. C/o L hip pain, PRN pain meds given. Tolerating diabetic diet. Ambulated with PT/OT. L hip dressing CDI. Voiding per urinal. Blood glucose monitoring maintained, SSI given per MAR. Bed locked in lowest position, bed alarm set and call bell within reach.       Problem: Adult Inpatient Plan of Care  Goal: Plan of Care Review  Outcome: Progressing

## 2024-07-05 NOTE — PROGRESS NOTES
[facility] Surg  Orthopedics  Progress Note    Patient Name: Tadeo Riley  MRN: [id number]  Admission Date: 7/3/2024  Hospital Length of Stay: 0 days  Attending Provider: Michael Holguin MD  Primary Care Provider: Salazar Miranda MD  Follow-up For: Procedure(s) (LRB):  ARTHROPLASTY, HIP (Left)    Post-Operative Day: 2 Days Post-Op  Subjective:     Principal Problem:History of left hip replacement    Principal Orthopedic Problem:  Same    Interval History:  The patient states that he is comfortable today.  He reports that he felt too weak to walk safely yesterday and is ready to try again today.  His physiotherapy notes corroborate his report    Review of patient's allergies indicates:   Allergen Reactions    Metformin Hives    Lisinopril Other (See Comments)    Benadryl [diphenhydramine hcl] Rash       Current Facility-Administered Medications   Medication    acetaminophen tablet 1,000 mg    aspirin EC tablet 81 mg    bisacodyL suppository 10 mg    dextrose 10% bolus 125 mL 125 mL    dextrose 10% bolus 250 mL 250 mL    DULoxetine DR capsule 30 mg    famotidine tablet 20 mg    furosemide tablet 40 mg    gabapentin capsule 300 mg    glucagon (human recombinant) injection 1 mg    glucose chewable tablet 16 g    glucose chewable tablet 24 g    HYDROmorphone injection 0.5 mg    insulin aspart U-100 pen 0-5 Units    insulin glargine U-100 (Lantus) pen 20 Units    lactulose 20 gram/30 mL solution Soln 20 g    losartan tablet 100 mg    ondansetron disintegrating tablet 4 mg    ondansetron injection 4 mg    oxyCODONE immediate release tablet 10 mg    oxyCODONE immediate release tablet 5 mg    polyethylene glycol packet 17 g    pravastatin tablet 40 mg    senna-docusate 8.6-50 mg per tablet 1 tablet    sodium chloride 0.9% flush 3 mL    sodium chloride 0.9% flush 5 mL    spironolactone tablet 50 mg    zolpidem tablet 5 mg     Objective:     Vital Signs (Most Recent):  Temp: 98.2 °F (36.8 °C) (07/05/24 0742)  Pulse: 95  "(07/05/24 0742)  Resp: 18 (07/05/24 0742)  BP: 129/64 (07/05/24 0742)  SpO2: 95 % (07/05/24 0742) Vital Signs (24h Range):  Temp:  [98 °F (36.7 °C)-98.5 °F (36.9 °C)] 98.2 °F (36.8 °C)  Pulse:  [] 95  Resp:  [16-18] 18  SpO2:  [94 %-99 %] 95 %  BP: (117-132)/(56-66) 129/64     Weight: (!) 155.9 kg (343 lb 11.2 oz)  Height: 5' 11" (180.3 cm)  Body mass index is 47.94 kg/m².      Intake/Output Summary (Last 24 hours) at 7/5/2024 0749  Last data filed at 7/5/2024 0423  Gross per 24 hour   Intake 600 ml   Output 1800 ml   Net -1200 ml       Ortho/SPM Exam    Appears alert and comfortable  Dressing dry and intact    Significant Labs: None    Significant Imaging: None    Assessment/Plan:     Active Diagnoses:    Diagnosis Date Noted POA    PRINCIPAL PROBLEM:  History of left hip replacement [Z96.642] 07/03/2024 Not Applicable      Problems Resolved During this Admission:   Left hip is normal for fresh postop.  Mobilization is limited by the patient's obesity and deconditioning.  Once the patient can ambulate safely with a walker, he may be discharged home.    Continue sliding scale for hyperglycemia      Michael Holguin MD  Orthopedics  [facility] Surg  "

## 2024-07-05 NOTE — PLAN OF CARE
Problem: Physical Therapy  Goal: Physical Therapy Goal  Description: Goals to be met by: 2024     Patient will increase functional independence with mobility by performin. Supine to sit with Modified [place]  2. Sit to supine with Modified [place]  3. Sit to stand transfer with Modified [place]  4. Gait  x 150 feet with Modified [place] using Rolling Walker.   5. Ascend/descend x3 steps with Rolling Walker as BHR with Stand by assistance.    Outcome: Progressing      PT session completed. Pt participated in bed mobility, transfers with MIN A with use of RW, ambulation, and stair navigation with use of RW as BHR and CGA. Pt complaint of increasing level of pain with increased distance. PT will continue to progress as pt is able.    Quality 431: Preventive Care And Screening: Unhealthy Alcohol Use - Screening: Patient not identified as an unhealthy alcohol user when screened for unhealthy alcohol use using a systematic screening method Quality 110: Preventive Care And Screening: Influenza Immunization: Influenza Immunization previously received during influenza season Detail Level: Detailed Quality 226: Preventive Care And Screening: Tobacco Use: Screening And Cessation Intervention: Patient screened for tobacco use and is an ex/non-smoker Quality 130: Documentation Of Current Medications In The Medical Record: Current Medications Documented

## 2024-07-05 NOTE — PT/OT/SLP PROGRESS
"Occupational Therapy   Treatment    Name: Tadeo Riley  MRN: [id number]  Admitting Diagnosis:  History of left hip replacement  2 Days Post-Op  The primary encounter diagnosis was Primary osteoarthritis of left hip. A diagnosis of History of left hip replacement was also pertinent to this visit.  Pre-op Diagnosis: Primary osteoarthritis of left hip [M16.12] s/p Procedure(s):  ARTHROPLASTY, HIP     Recommendations:     Discharge Recommendations: Low Intensity Therapy  Discharge Equipment Recommendations:  bedside commode, bath bench, hip kit, walker, rolling  Barriers to discharge:   (L hip pain)    Assessment:     Tadeo Riley is a 58 y.o. male with a medical diagnosis of History of left hip replacement.  He presents with 9/10 L hip pain. Performance deficits affecting function are weakness, impaired endurance, impaired self care skills, impaired functional mobility, gait instability, impaired balance, impaired cardiopulmonary response to activity, orthopedic precautions, decreased ROM.   Pt refused OOB due to pain L hip and stated he got OOB 2 times already today. OT explained role of OT and pt OT goals. Pt. did not want pain medication when offered. OT provided slush ice pk to left hip for pain control. Pt said hip pain was somewhat better with ice pk application but still "Hurts". Nurse was notified.     Rehab Prognosis:  Good and Fair; patient would benefit from acute skilled OT services to address these deficits and reach maximum level of function.       Plan:     Patient to be seen daily to address the above listed problems via self-care/home management, therapeutic activities, therapeutic exercises  Plan of Care Expires: 07/12/24  Plan of Care Reviewed with: patient    Subjective     Chief Complaint: L hip pain 9/10  Patient/Family Comments/goals: pt not willing to participate in OOB activity with OT despite education and offering of to ask nurse for pain medication since he had not had any since the am.   " "  Pain/Comfort:  Pain Rating 1: 9/10  Location - Side 1: Left  Location - Orientation 1: lateral  Location 1: hip  Pain Addressed 1: Distraction, Nurse notified (ice slusg bag/pk)  Pain Rating Post-Intervention 1:  ("pain felt better but still hurts")    Objective:     Communicated with: nurse prior to session.  Patient found HOB elevated with bed alarm, telemetry upon OT entry to room.    General Precautions: Standard, fall    Orthopedic Precautions:LLE weight bearing as tolerated, LLE posterior precautions  Braces: N/A  Respiratory Status: Room air     Occupational Performance:     Bed Mobility:    Pt not receptive    Functional Mobility/Transfers:  Pt not receptive    Activities of Daily Living:  Pt not receptive    [facility] 6 Click ADL: 17    Treatment & Education:  Purpose of OT and POC  Pt educated in the impt of OOB activity, reviewed OT goals, hip kit use for LE ADLS with demonstration-ongoing  2/2 pt refused activity  due to L hip pain and refused OT offer to ask his nurse for pain meds.   OT provided pt with ia slush ice pk and applied to L hip for pain control. All precautions reviewed.  He acknowledged understanding and that pain was feeling better after ice applied but still hurts.   Nurse notified.     Patient left HOB elevated with all lines intact, call button in reach, bed alarm on, and nurse notified    GOALS:   Multidisciplinary Problems       Occupational Therapy Goals          Problem: Occupational Therapy    Goal Priority Disciplines Outcome Interventions   Occupational Therapy Goal     OT, PT/OT Not Progressing    Description: Goals to be met by: D/C     Patient will increase functional independence with ADLs by performing:    LE Dressing with Set-up Assistance and Assistive Devices as needed.  Grooming while seated with Set-up Assistance.  Toileting from toilet with Supervision for hygiene and clothing management.   Step transfer with Supervision  Toilet transfer to toilet with Supervision.      "                    Time Tracking:     OT Date of Treatment: 07/05/24  OT Start Time: 1405  OT Stop Time: 1417  OT Total Time (min): 12 min    Billable Minutes:Self Care/Home Management 12  Total Time 12    OT/KENDALL: OT          7/5/2024

## 2024-07-05 NOTE — PT/OT/SLP PROGRESS
Physical Therapy Treatment    Patient Name:  Tadeo Riley   MRN:  [id number]    Recommendations:     Discharge Recommendations: Low Intensity Therapy (HH PT)  Discharge Equipment Recommendations:  (RW delivered to room)  Barriers to discharge:  Limited functional mobility/endurance, 3 [place]     Assessment:     Tadeo Riley is a 58 y.o. male admitted with a medical diagnosis of History of left hip replacement.  He presents with the following impairments/functional limitations: weakness, impaired endurance, impaired self care skills, impaired functional mobility, gait instability, impaired balance, orthopedic precautions, pain, decreased lower extremity function, decreased ROM.    PT session completed. Pt participated in bed mobility, transfers, ambulation, and seated there-ex with CGA and use of RW. PT will continue to progress as pt is able.    Rehab Prognosis: Good; patient would benefit from acute skilled PT services to address these deficits and reach maximum level of function.    Recent Surgery: Procedure(s) (LRB):  ARTHROPLASTY, HIP (Left) 2 Days Post-Op    Plan:     During this hospitalization, patient to be seen BID to address the identified rehab impairments via gait training, therapeutic activities, therapeutic exercises, neuromuscular re-education and progress toward the following goals:    Plan of Care Expires:  07/11/24    Subjective     Chief Complaint: pain to LLE  Patient/Family Comments/goals: Not stated  Pain/Comfort:  Pain Rating 1: 8/10  Location - Side 1: Left  Location 1: hip  Pain Addressed 1: Reposition, Cessation of Activity, Nurse notified  Pain Rating Post-Intervention 1: 9/10      Objective:     Communicated with nurse prior to session.  Patient found HOB elevated with bed alarm, telemetry upon PT entry to room.     General Precautions: Standard, fall  Orthopedic Precautions: LLE weight bearing as tolerated, LLE posterior precautions  Braces: N/A  Respiratory Status: Room air      Functional Mobility:  Bed Mobility:     Supine to Sit: minimum assistance; PT assist with lowering LLE OOB, HOB elevated, increased time  Sit to Supine: minimum assistance; PT assist with raising LLE on to bed  Transfers:     Sit to Stand:  stand by assistance with rolling walker; HOB elevated  Gait: ~30ft with use of RW and SBA  *Deferred chair transfer due to pt complaint of level of pain      AM-PAC 6 CLICK MOBILITY  Turning over in bed (including adjusting bedclothes, sheets and blankets)?: 4  Sitting down on and standing up from a chair with arms (e.g., wheelchair, bedside commode, etc.): 3  Moving from lying on back to sitting on the side of the bed?: 3  Moving to and from a bed to a chair (including a wheelchair)?: 3  Need to walk in hospital room?: 3  Climbing 3-5 steps with a railing?: 3  Basic Mobility Total Score: 19       Treatment & Education:  PT provided max verbal encouragement for participation in therapy.   Pt ambulated ~30ft with use of RW and SBA to improve dynamic standing balance.  PT provided max verbal education of importance to sit up in chair.  Deferred step navigation due to pt complaint of level of pain.  Pt performed 1x3 seated mini marches to improve functional mobility; limited due to level of pain, pt requested to return to supine.  Pt educated on use of call button for staff assistance for any needs.  Pt understanding of all education given.      Patient left HOB elevated with all lines intact, call button in reach, bed alarm on, and nursing notified..    GOALS:   Multidisciplinary Problems       Physical Therapy Goals          Problem: Physical Therapy    Goal Priority Disciplines Outcome Goal Variances Interventions   Physical Therapy Goal     PT, PT/OT Progressing     Description: Goals to be met by: 2024     Patient will increase functional independence with mobility by performin. Supine to sit with Modified [place]  2. Sit to supine with Modified  [place]  3. Sit to stand transfer with Modified [place]  4. Gait  x 150 feet with Modified [place] using Rolling Walker.   5. Ascend/descend x3 steps with Rolling Walker as BHR with Stand by assistance.                         Time Tracking:     PT Received On: 07/05/24  PT Start Time: 1324     PT Stop Time: 1349  PT Total Time (min): 25 min     Billable Minutes: Gait Training 15 and Therapeutic Exercise 10    Treatment Type: Treatment  PT/PTA: PT     Number of PTA visits since last PT visit: 0     07/05/2024

## 2024-07-05 NOTE — PLAN OF CARE
AAO,vss,left hip dressing dry and intact,denies numbness or tingling,prn given for c/o pain w relief voiced,scd in use,safety maintained.

## 2024-07-05 NOTE — PLAN OF CARE
"  Problem: Occupational Therapy  Goal: Occupational Therapy Goal  Description: Goals to be met by: D/C     Patient will increase functional independence with ADLs by performing:    LE Dressing with Set-up Assistance and Assistive Devices as needed.  Grooming while seated with Set-up Assistance.  Toileting from toilet with Supervision for hygiene and clothing management.   Step transfer with Supervision  Toilet transfer to toilet with Supervision.    Outcome: Not Progressing   Pt refused OOB due to pain L hip and stated he got OOB 2 times already today. OT explained role of OT and pt OT goals but did not want pain medication when offered. OT provided slush ice pk to left hip for pain control. Pt said hip pain was somewhat better with ice pk application but still "Hurts". Nurse was notified.   "

## 2024-07-05 NOTE — PLAN OF CARE
Problem: Physical Therapy  Goal: Physical Therapy Goal  Description: Goals to be met by: 2024     Patient will increase functional independence with mobility by performin. Supine to sit with Modified [place]  2. Sit to supine with Modified [place]  3. Sit to stand transfer with Modified [place]  4. Gait  x 150 feet with Modified [place] using Rolling Walker.   5. Ascend/descend x3 steps with Rolling Walker as BHR with Stand by assistance.    2024 1357 by Anjelica Oconnor, ZENAIDA  Outcome: Progressing  PT session completed. Pt participated in bed mobility, transfers, ambulation, and seated there-ex with CGA and use of RW. PT will continue to progress as pt is able.

## 2024-07-05 NOTE — PLAN OF CARE
D/c orders noted.     Set up completed for pt to resume  services with [facility]  [place].     Pt received his DME at [facility].     Pt's spouse will provide transportation home following discharge.     Pt is cleared to go from CM standpoint.   Future Appointments   Date Time Provider Department Center   7/25/2024  8:45 AM [facility] ORTHO CLINIC LIMIT 350LBS [facility]   7/25/2024  9:15 AM Michael Holguin MD [facility]THO [facility]         07/05/24 0822   Final Note   Assessment Type Final Discharge Note   Anticipated Discharge Disposition Home-Health  ([facility]  [place])   Post-Acute Status   Post-Acute Authorization HME;Home Health   HME Status Set-up Complete/Auth obtained   Home Health Status Set-up Complete/Auth obtained   Discharge Delays None known at this time

## 2024-07-05 NOTE — PT/OT/SLP PROGRESS
Physical Therapy Treatment    Patient Name:  Tadeo Riley   MRN:  [id number]    Recommendations:     Discharge Recommendations: Low Intensity Therapy (HH PT)  Discharge Equipment Recommendations:  (RW delivered to room)  Barriers to discharge: Decreased caregiver support,3 [place], and limited functional mobility/endurance    Assessment:     Tadeo Riley is a 58 y.o. male admitted with a medical diagnosis of History of left hip replacement.  He presents with the following impairments/functional limitations: weakness, impaired endurance, impaired self care skills, impaired functional mobility, gait instability, impaired balance, decreased lower extremity function, pain, decreased ROM, orthopedic precautions.     PT session completed. Pt participated in bed mobility, transfers with MIN A with use of RW, ambulation, and stair navigation with use of RW as BHR and CGA. Pt complaint of increasing level of pain with increased distance. PT will continue to progress as pt is able.     Rehab Prognosis: Good; patient would benefit from acute skilled PT services to address these deficits and reach maximum level of function.    Recent Surgery: Procedure(s) (LRB):  ARTHROPLASTY, HIP (Left) 2 Days Post-Op    Plan:     During this hospitalization, patient to be seen BID to address the identified rehab impairments via gait training, therapeutic activities, therapeutic exercises, neuromuscular re-education and progress toward the following goals:    Plan of Care Expires:  07/11/24    Subjective     Chief Complaint: pain to L hip  Patient/Family Comments/goals: not stated  Pain/Comfort:  Pain Rating 1: 0/10  Location - Side 1: Left  Location 1: hip  Pain Addressed 1: Pre-medicate for activity, Reposition, Cessation of Activity, Nurse notified  Pain Rating Post-Intervention 1: 9/10      Objective:     Communicated with nurse prior to session.  Patient found HOB elevated with telemetry upon PT entry to room.     General Precautions:  "Standard, fall  Orthopedic Precautions: LLE weight bearing as tolerated, LLE posterior precautions  Braces: N/A  Respiratory Status: Room air     Functional Mobility:  Bed Mobility:     Supine to Sit: minimum assistance; PT assist with lowering LLE OOB; HOB elevated, increased time  Sit to Supine: minimum assistance; PT assist with raising LLE on to bed  Transfers:     Sit to Stand:  minimum assistance with rolling walker, 2nd trial CGA with RW  Gait: ~25ft with use of RW and CGA; pt required max verbal cues for RW sequencing.   Stairs:  Pt ascended/descended 4" curb step with Rolling Walker with bilateral handrails with Contact Guard Assistance.       AM-PAC 6 CLICK MOBILITY  Turning over in bed (including adjusting bedclothes, sheets and blankets)?: 4  Sitting down on and standing up from a chair with arms (e.g., wheelchair, bedside commode, etc.): 3  Moving from lying on back to sitting on the side of the bed?: 3  Moving to and from a bed to a chair (including a wheelchair)?: 3  Need to walk in hospital room?: 3  Climbing 3-5 steps with a railing?: 3  Basic Mobility Total Score: 19       Treatment & Education:  Pt required verbal cues for recollection of proper hip precautions.  Pt required max verbal cues for RW sequencing and stepping pattern for safe ambulation.  Pt ambulated ~25ft with use of RW and CGA to improve dynamic standing balance.  Pt ascended/descended x1 4" step with RW as BHR to mimic home setup for safe entry/exit of home.  PT provided max education on importance of sitting up in chair; pt deferred transfer at this time due to pain.  Pt educated on importance of lying in bed with HOB elevated.   Pt given and educated on proper use of gait belt for safe mobility as needed at home.  Pt educated on proper ice use/duration/positioning.   Pt educated on use of call button for staff assistance for any needs.  Pt understanding of all education given.     Patient left HOB elevated with all lines intact, " call button in reach, bed alarm on, and nurse notified..    GOALS:   Multidisciplinary Problems       Physical Therapy Goals          Problem: Physical Therapy    Goal Priority Disciplines Outcome Goal Variances Interventions   Physical Therapy Goal     PT, PT/OT Progressing     Description: Goals to be met by: 2024     Patient will increase functional independence with mobility by performin. Supine to sit with Modified [place]  2. Sit to supine with Modified [place]  3. Sit to stand transfer with Modified [place]  4. Gait  x 150 feet with Modified [place] using Rolling Walker.   5. Ascend/descend x3 steps with Rolling Walker as BHR with Stand by assistance.                         Time Tracking:     PT Received On: 24  PT Start Time: 848     PT Stop Time: 932  PT Total Time (min): 44 min     Billable Minutes: Gait Training 20 and Therapeutic Activity 24    Treatment Type: Treatment  PT/PTA: PT     Number of PTA visits since last PT visit: 0     2024

## 2024-07-06 LAB
POCT GLUCOSE: 269 MG/DL (ref 70–110)
POCT GLUCOSE: 295 MG/DL (ref 70–110)
POCT GLUCOSE: 304 MG/DL (ref 70–110)
POCT GLUCOSE: 336 MG/DL (ref 70–110)

## 2024-07-06 PROCEDURE — 97530 THERAPEUTIC ACTIVITIES: CPT | Mod: CQ

## 2024-07-06 PROCEDURE — 97535 SELF CARE MNGMENT TRAINING: CPT

## 2024-07-06 PROCEDURE — 94760 N-INVAS EAR/PLS OXIMETRY 1: CPT

## 2024-07-06 PROCEDURE — A4216 STERILE WATER/SALINE, 10 ML: HCPCS | Performed by: ORTHOPAEDIC SURGERY

## 2024-07-06 PROCEDURE — 97530 THERAPEUTIC ACTIVITIES: CPT

## 2024-07-06 PROCEDURE — 25000003 PHARM REV CODE 250: Performed by: ORTHOPAEDIC SURGERY

## 2024-07-06 PROCEDURE — 94761 N-INVAS EAR/PLS OXIMETRY MLT: CPT

## 2024-07-06 RX ADMIN — OXYCODONE 5 MG: 5 TABLET ORAL at 12:07

## 2024-07-06 RX ADMIN — DULOXETINE HYDROCHLORIDE 30 MG: 30 CAPSULE, DELAYED RELEASE ORAL at 08:07

## 2024-07-06 RX ADMIN — Medication 3 ML: at 09:07

## 2024-07-06 RX ADMIN — INSULIN ASPART 3 UNITS: 100 INJECTION, SOLUTION INTRAVENOUS; SUBCUTANEOUS at 05:07

## 2024-07-06 RX ADMIN — ASPIRIN 81 MG: 81 TABLET, COATED ORAL at 08:07

## 2024-07-06 RX ADMIN — SENNOSIDES AND DOCUSATE SODIUM 1 TABLET: 50; 8.6 TABLET ORAL at 08:07

## 2024-07-06 RX ADMIN — PRAVASTATIN SODIUM 40 MG: 40 TABLET ORAL at 08:07

## 2024-07-06 RX ADMIN — ACETAMINOPHEN 1000 MG: 500 TABLET ORAL at 02:07

## 2024-07-06 RX ADMIN — INSULIN GLARGINE 20 UNITS: 100 INJECTION, SOLUTION SUBCUTANEOUS at 05:07

## 2024-07-06 RX ADMIN — FUROSEMIDE 40 MG: 40 TABLET ORAL at 08:07

## 2024-07-06 RX ADMIN — LOSARTAN POTASSIUM 100 MG: 50 TABLET, FILM COATED ORAL at 08:07

## 2024-07-06 RX ADMIN — Medication 3 ML: at 05:07

## 2024-07-06 RX ADMIN — INSULIN ASPART 4 UNITS: 100 INJECTION, SOLUTION INTRAVENOUS; SUBCUTANEOUS at 06:07

## 2024-07-06 RX ADMIN — FAMOTIDINE 20 MG: 20 TABLET ORAL at 08:07

## 2024-07-06 RX ADMIN — OXYCODONE 10 MG: 5 TABLET ORAL at 02:07

## 2024-07-06 RX ADMIN — ACETAMINOPHEN 1000 MG: 500 TABLET ORAL at 08:07

## 2024-07-06 RX ADMIN — INSULIN GLARGINE 20 UNITS: 100 INJECTION, SOLUTION SUBCUTANEOUS at 08:07

## 2024-07-06 RX ADMIN — POLYETHYLENE GLYCOL 3350 17 G: 17 POWDER, FOR SOLUTION ORAL at 08:07

## 2024-07-06 RX ADMIN — SPIRONOLACTONE 50 MG: 25 TABLET, FILM COATED ORAL at 08:07

## 2024-07-06 RX ADMIN — GABAPENTIN 300 MG: 300 CAPSULE ORAL at 08:07

## 2024-07-06 RX ADMIN — INSULIN ASPART 2 UNITS: 100 INJECTION, SOLUTION INTRAVENOUS; SUBCUTANEOUS at 09:07

## 2024-07-06 RX ADMIN — INSULIN ASPART 3 UNITS: 100 INJECTION, SOLUTION INTRAVENOUS; SUBCUTANEOUS at 12:07

## 2024-07-06 RX ADMIN — Medication 3 ML: at 02:07

## 2024-07-06 NOTE — PROGRESS NOTES
[facility] Surg  Orthopedics  Progress Note    Patient Name: Tadeo Riley  MRN: [id number]  Admission Date: 7/3/2024  Hospital Length of Stay: 0 days  Attending Provider: Michael Holguin MD  Primary Care Provider: Salazar Miranda MD  Follow-up For: Procedure(s) (LRB):  ARTHROPLASTY, HIP (Left)    Post-Operative Day: 3 Days Post-Op  Subjective:     Principal Problem:History of left hip replacement    Principal Orthopedic Problem:  Same    Interval History:  Reports 5/10 pain to left hip this morning.  Able to ambulate a short distance PT yesterday but continued difficulty with fatigue/tolerance.   His physiotherapy notes corroborate his report    Review of patient's allergies indicates:   Allergen Reactions    Metformin Hives    Lisinopril Other (See Comments)    Benadryl [diphenhydramine hcl] Rash       Current Facility-Administered Medications   Medication    acetaminophen tablet 1,000 mg    aspirin EC tablet 81 mg    bisacodyL suppository 10 mg    dextrose 10% bolus 125 mL 125 mL    dextrose 10% bolus 250 mL 250 mL    DULoxetine DR capsule 30 mg    famotidine tablet 20 mg    furosemide tablet 40 mg    gabapentin capsule 300 mg    glucagon (human recombinant) injection 1 mg    glucose chewable tablet 16 g    glucose chewable tablet 24 g    HYDROmorphone injection 0.5 mg    insulin aspart U-100 pen 0-5 Units    insulin glargine U-100 (Lantus) pen 20 Units    lactulose 20 gram/30 mL solution Soln 20 g    losartan tablet 100 mg    ondansetron disintegrating tablet 4 mg    ondansetron injection 4 mg    oxyCODONE immediate release tablet 10 mg    oxyCODONE immediate release tablet 5 mg    polyethylene glycol packet 17 g    pravastatin tablet 40 mg    senna-docusate 8.6-50 mg per tablet 1 tablet    sodium chloride 0.9% flush 3 mL    sodium chloride 0.9% flush 5 mL    spironolactone tablet 50 mg    zolpidem tablet 5 mg     Objective:     Vital Signs (Most Recent):  Temp: 98.6 °F (37 °C) (07/06/24 0719)  Pulse: 92  "(07/06/24 0719)  Resp: 19 (07/06/24 0719)  BP: 127/74 (07/06/24 0719)  SpO2: (!) 94 % (07/06/24 0719) Vital Signs (24h Range):  Temp:  [98 °F (36.7 °C)-98.8 °F (37.1 °C)] 98.6 °F (37 °C)  Pulse:  [] 92  Resp:  [17-20] 19  SpO2:  [94 %-100 %] 94 %  BP: (107-174)/(52-74) 127/74     Weight: (!) 154.8 kg (341 lb 4.4 oz)  Height: 5' 11" (180.3 cm)  Body mass index is 47.6 kg/m².      Intake/Output Summary (Last 24 hours) at 7/6/2024 1038  Last data filed at 7/6/2024 0400  Gross per 24 hour   Intake 630 ml   Output 950 ml   Net -320 ml       Ortho/SPM Exam    Appears alert and comfortable  Dressing dry and intact  Motor intact EHL/FHL/TA/GS    Significant Labs: None    Significant Imaging: None    Assessment/Plan:     Active Diagnoses:    Diagnosis Date Noted POA    PRINCIPAL PROBLEM:  History of left hip replacement [Z96.642] 07/03/2024 Not Applicable      Problems Resolved During this Admission:   Left hip is normal for fresh postop.  Mobilization is limited by the patient's obesity and deconditioning.  Once the patient can ambulate safely with a walker, he may be discharged home.  Currently not safe for discharge at this time.     Continue sliding scale for hyperglycemia      Hyacinth Linares MD PGY-3  Orthopedics  [facility] Surg  "

## 2024-07-06 NOTE — PT/OT/SLP PROGRESS
Occupational Therapy   Treatment    Name: Tadeo Riley  MRN: [id number]  Admitting Diagnosis:  History of left hip replacement  3 Days Post-Op  ,The primary encounter diagnosis was Primary osteoarthritis of left hip. A diagnosis of History of left hip replacement was also pertinent to this visit.  Pre-op Diagnosis: Primary osteoarthritis of left hip [M16.12] s/p Procedure(s):  ARTHROPLASTY, HIP     Recommendations:     Discharge Recommendations: Low Intensity Therapy  Discharge Equipment Recommendations:  bedside commode, bath bench, hip kit, walker, rolling  Barriers to discharge:  None    Assessment:     Tadeo Riley is a 58 y.o. male with a medical diagnosis of History of left hip replacement.  He presents with  Performance deficits affecting function are weakness, impaired endurance, impaired self care skills, impaired functional mobility, gait instability, impaired balance, impaired cardiopulmonary response to activity, orthopedic precautions, decreased ROM.   Pain L hip 9/10 pre and post tx.  Pt progressing toward OT goals. He performed bed mobility supine<>sit Mod A for long sit & reverse long sit  tech for sup<>sit for posterior hip precautions, LE dressing SBA to don socks w/sock aid, doffed SBA w/ reacher seated EOB. Pt ambulated SBA w/ RW 10ft x 2 to bathroom. He performed toileting standing w/ RW to urinate in toilet, g/hygiene SBA w/ RW at sink.     Rehab Prognosis:  Good; patient would benefit from acute skilled OT services to address these deficits and reach maximum level of function.       Plan:     Patient to be seen daily to address the above listed problems via self-care/home management, therapeutic activities, therapeutic exercises  Plan of Care Expires: 07/12/24  Plan of Care Reviewed with: patient    Subjective     Chief Complaint: L hip pain.  Patient/Family Comments/goals: pt agreeable to OT.  Pain/Comfort:  Pain Rating 1: 9/10  Location - Side 1: Left  Location - Orientation 1:  lateral  Location 1: hip  Pain Addressed 1: Reposition, Distraction, Cessation of Activity, Nurse notified (ice pk aplied after tx)  Pain Rating Post-Intervention 1: 9/10    Objective:     Communicated with: nurse prior to session.  Patient found HOB elevated with bed alarm, telemetry upon OT entry to room.    General Precautions: Standard, fall, diabetic    Orthopedic Precautions:LLE weight bearing as tolerated, LLE posterior precautions  Braces: N/A  Respiratory Status: Room air     Occupational Performance:     Bed Mobility:    Patient completed Scooting/Bridging with moderate assistance  Patient completed Supine to Sit with moderate assistance  Patient completed Sit to Supine with moderate assistance     Functional Mobility/Transfers:  Patient completed Sit <> Stand Transfer with stand by assistance  with  rolling walker and slow ascension and descending 2/2 L hip pain   Functional Mobility: ambulated SBA 10 ft x 2 with RW to bathroom and back to bed. Slow and cautious pace, 3 point gait tech, no LOB. Min vc for reminder of no twisting when turning away from sink headed out of bathroom, pt demonstrated corrected tech..    Activities of Daily Living:  Grooming: stand by assistance washed hands standing inside RW at sink  Lower Body Dressing: moderate assistance donned socks with socks aid and doffed with reacher seated EOB.  Toileting: stand by assistance pt stood with RW over toilet to urinate from standing position.      [facility] 6 Click ADL: 19    Treatment & Education:  Purpose of OT and POC  Re-educated pt in posterior hip precautions. Min vc needed with self care and mobility.  Pt seen for safe self care and fx mobility retraining with posterior hip precautions implementation and adaptive devices use as stated above.   All questions./concerns addressed within scope.   Call don't fall  Impt of OOB activity,pt declined sitting UIC, he stated he wanted to wait for spouse to come.   Nurse notified of pt's request  for pain meds after OT session.    Patient left HOB elevated with all lines intact, call button in reach, bed alarm on, and nurse notified    GOALS:   Multidisciplinary Problems       Occupational Therapy Goals          Problem: Occupational Therapy    Goal Priority Disciplines Outcome Interventions   Occupational Therapy Goal     OT, PT/OT Not Progressing    Description: Goals to be met by: D/C     Patient will increase functional independence with ADLs by performing:    LE Dressing with Set-up Assistance and Assistive Devices as needed.  Grooming while seated with Set-up Assistance.  Toileting from toilet with Supervision for hygiene and clothing management.   Step transfer with Supervision  Toilet transfer to toilet with Supervision.                         Time Tracking:     OT Date of Treatment: 07/06/24  OT Start Time: 1403  OT Stop Time: 1437  OT Total Time (min): 34 min    Billable Minutes:Self Care/Home Management 24  Therapeutic Activity 10  Total Time 34    OT/KENDALL: OT          7/6/2024

## 2024-07-06 NOTE — PT/OT/SLP PROGRESS
Physical Therapy Treatment    Patient Name:  Tadeo Riley   MRN:  [id number]    Recommendations:     Discharge Recommendations: Low Intensity Therapy  Discharge Equipment Recommendations:  (RW delivered to room)  Barriers to discharge:  Limited functional mobility/endurance, 3 [place].     Assessment:     Tadeo Riley is a 58 y.o. male admitted with a medical diagnosis of History of left hip replacement.  He presents with the following impairments/functional limitations: weakness, impaired endurance, decreased coordination, impaired self care skills, decreased lower extremity function, impaired functional mobility, gait instability, orthopedic precautions.    Rehab Prognosis: Good; patient would benefit from acute skilled PT services to address these deficits and reach maximum level of function.    Recent Surgery: Procedure(s) (LRB):  ARTHROPLASTY, HIP (Left) 3 Days Post-Op    Plan:     During this hospitalization, patient to be seen BID (daily Sat and Sun.) to address the identified rehab impairments via gait training, therapeutic activities, therapeutic exercises, neuromuscular re-education and progress toward the following goals:    Plan of Care Expires:  07/11/24    Subjective     Chief Complaint: Pt with no complaints or concerns at this time.   Patient/Family Comments/goals: Pt agreeable to PT treatment.   Pain/Comfort:  Pain Rating 1:  (not rated)      Objective:     Patient found HOB elevated with bed alarm, telemetry upon PT entry to room.     General Precautions: Standard, fall  Orthopedic Precautions: LLE posterior precautions, LLE weight bearing as tolerated  Braces: N/A  Respiratory Status: Room air     Functional Mobility:  Bed Mobility:     Supine to Sit: minimum assistance  Sit to Supine: minimum assistance  Transfers:     Sit to Stand:  contact guard assistance with rolling walker  Gait: Pt ambulated 15-20 ft with RW, CGA requiring verbal cueing on proper AD placement and LE step sequencing.    Balance: Pt with good sitting and fair standing balance.       AM-PAC 6 CLICK MOBILITY  Turning over in bed (including adjusting bedclothes, sheets and blankets)?: 4  Sitting down on and standing up from a chair with arms (e.g., wheelchair, bedside commode, etc.): 3  Moving from lying on back to sitting on the side of the bed?: 3  Moving to and from a bed to a chair (including a wheelchair)?: 3  Need to walk in hospital room?: 3  Climbing 3-5 steps with a railing?: 3  Basic Mobility Total Score: 19       Treatment & Education:      Patient left HOB elevated with all lines intact, call button in reach, and bed alarm on..    GOALS:   Multidisciplinary Problems       Physical Therapy Goals          Problem: Physical Therapy    Goal Priority Disciplines Outcome Goal Variances Interventions   Physical Therapy Goal     PT, PT/OT Progressing     Description: Goals to be met by: 2024     Patient will increase functional independence with mobility by performin. Supine to sit with Modified [place]  2. Sit to supine with Modified [place]  3. Sit to stand transfer with Modified [place]  4. Gait  x 150 feet with Modified [place] using Rolling Walker.   5. Ascend/descend x3 steps with Rolling Walker as BHR with Stand by assistance.                         Time Tracking:     PT Received On: 24  PT Start Time: 931     PT Stop Time: 949  PT Total Time (min): 18 min     Billable Minutes: Therapeutic Activity 18    Treatment Type: Treatment  PT/PTA: PTA     Number of PTA visits since last PT visit: 2024

## 2024-07-06 NOTE — PLAN OF CARE
Plan of care reviewed with patient. Patient verbalized understanding. Medicated per MAR. Instructed to use call light for assistance. Call light in reach.

## 2024-07-06 NOTE — PLAN OF CARE
Problem: Adult Inpatient Plan of Care  Goal: Plan of Care Review  Outcome: Progressing  Goal: Patient-Specific Goal (Individualized)  Outcome: Progressing  Goal: Absence of Hospital-Acquired Illness or Injury  Outcome: Progressing  Goal: Optimal Comfort and Wellbeing  Outcome: Progressing  Goal: Readiness for Transition of Care  Outcome: Progressing     Problem: Diabetes Comorbidity  Goal: Blood Glucose Level Within Targeted Range  Outcome: Progressing     Problem: Bariatric Environmental Safety  Goal: Safety Maintained with Care  Outcome: Progressing     Problem: Wound  Goal: Optimal Coping  Outcome: Progressing  Goal: Optimal Functional Ability  Outcome: Progressing  Goal: Absence of Infection Signs and Symptoms  Outcome: Progressing  Goal: Improved Oral Intake  Outcome: Progressing  Goal: Optimal Pain Control and Function  Outcome: Progressing  Goal: Skin Health and Integrity  Outcome: Progressing  Goal: Optimal Wound Healing  Outcome: Progressing     Problem: Fall Injury Risk  Goal: Absence of Fall and Fall-Related Injury  Outcome: Progressing     Problem: Skin Injury Risk Increased  Goal: Skin Health and Integrity  Outcome: Progressing     Problem: Hip Arthroplasty  Goal: Optimal Coping  Outcome: Progressing  Goal: Absence of Bleeding  Outcome: Progressing  Goal: Effective Bowel Elimination  Outcome: Progressing  Goal: Fluid and Electrolyte Balance  Outcome: Progressing  Goal: Optimal Functional Ability  Outcome: Progressing  Goal: Absence of Infection Signs and Symptoms  Outcome: Progressing  Goal: Intact Neurovascular Status  Outcome: Progressing  Goal: Anesthesia/Sedation Recovery  Outcome: Progressing  Goal: Acceptable Pain Control  Outcome: Progressing  Goal: Nausea and Vomiting Relief  Outcome: Progressing  Goal: Effective Urinary Elimination  Outcome: Progressing  Goal: Effective Oxygenation and Ventilation  Outcome: Progressing     Problem: Comorbidity Management  Goal: Maintenance of COPD Symptom  Control  Outcome: Progressing  Goal: Maintenance of Heart Failure Symptom Control  Outcome: Progressing  Goal: Blood Pressure in Desired Range  Outcome: Progressing     Problem: Infection  Goal: Absence of Infection Signs and Symptoms  Outcome: Progressing

## 2024-07-07 LAB
POCT GLUCOSE: 279 MG/DL (ref 70–110)
POCT GLUCOSE: 285 MG/DL (ref 70–110)
POCT GLUCOSE: 286 MG/DL (ref 70–110)
POCT GLUCOSE: 316 MG/DL (ref 70–110)

## 2024-07-07 PROCEDURE — 25000003 PHARM REV CODE 250: Performed by: ORTHOPAEDIC SURGERY

## 2024-07-07 PROCEDURE — 97535 SELF CARE MNGMENT TRAINING: CPT

## 2024-07-07 PROCEDURE — A4216 STERILE WATER/SALINE, 10 ML: HCPCS | Performed by: ORTHOPAEDIC SURGERY

## 2024-07-07 PROCEDURE — 99900035 HC TECH TIME PER 15 MIN (STAT)

## 2024-07-07 PROCEDURE — 94761 N-INVAS EAR/PLS OXIMETRY MLT: CPT

## 2024-07-07 RX ADMIN — Medication 3 ML: at 01:07

## 2024-07-07 RX ADMIN — PRAVASTATIN SODIUM 40 MG: 40 TABLET ORAL at 08:07

## 2024-07-07 RX ADMIN — Medication 3 ML: at 06:07

## 2024-07-07 RX ADMIN — ASPIRIN 81 MG: 81 TABLET, COATED ORAL at 08:07

## 2024-07-07 RX ADMIN — SPIRONOLACTONE 50 MG: 25 TABLET, FILM COATED ORAL at 08:07

## 2024-07-07 RX ADMIN — INSULIN GLARGINE 20 UNITS: 100 INJECTION, SOLUTION SUBCUTANEOUS at 04:07

## 2024-07-07 RX ADMIN — GABAPENTIN 300 MG: 300 CAPSULE ORAL at 08:07

## 2024-07-07 RX ADMIN — LOSARTAN POTASSIUM 100 MG: 50 TABLET, FILM COATED ORAL at 08:07

## 2024-07-07 RX ADMIN — FAMOTIDINE 20 MG: 20 TABLET ORAL at 08:07

## 2024-07-07 RX ADMIN — INSULIN GLARGINE 20 UNITS: 100 INJECTION, SOLUTION SUBCUTANEOUS at 08:07

## 2024-07-07 RX ADMIN — OXYCODONE 10 MG: 5 TABLET ORAL at 08:07

## 2024-07-07 RX ADMIN — ACETAMINOPHEN 1000 MG: 500 TABLET ORAL at 08:07

## 2024-07-07 RX ADMIN — FUROSEMIDE 40 MG: 40 TABLET ORAL at 08:07

## 2024-07-07 RX ADMIN — ACETAMINOPHEN 1000 MG: 500 TABLET ORAL at 04:07

## 2024-07-07 RX ADMIN — INSULIN ASPART 3 UNITS: 100 INJECTION, SOLUTION INTRAVENOUS; SUBCUTANEOUS at 04:07

## 2024-07-07 RX ADMIN — DULOXETINE HYDROCHLORIDE 30 MG: 30 CAPSULE, DELAYED RELEASE ORAL at 08:07

## 2024-07-07 RX ADMIN — INSULIN ASPART 1 UNITS: 100 INJECTION, SOLUTION INTRAVENOUS; SUBCUTANEOUS at 08:07

## 2024-07-07 RX ADMIN — INSULIN ASPART 3 UNITS: 100 INJECTION, SOLUTION INTRAVENOUS; SUBCUTANEOUS at 11:07

## 2024-07-07 RX ADMIN — INSULIN ASPART 4 UNITS: 100 INJECTION, SOLUTION INTRAVENOUS; SUBCUTANEOUS at 06:07

## 2024-07-07 NOTE — PT/OT/SLP PROGRESS
Physical Therapy      Patient Name:  Tadeo Riley   MRN:  [id number]    Patient not seen today secondary to Patient unwilling to participate, Patient fatigue. Will follow-up Monday. 2 attempts were made to see the patient today - first at 1025 am and second at 130 PM after lunch. Both times the patient stated that he was just getting back to bed and was too tired after walking around the room. Reviewed hip precautions and step climbing for home.

## 2024-07-07 NOTE — PLAN OF CARE
Problem: Adult Inpatient Plan of Care  Goal: Plan of Care Review  Outcome: Progressing     Problem: Diabetes Comorbidity  Goal: Blood Glucose Level Within Targeted Range  Outcome: Progressing     Problem: Bariatric Environmental Safety  Goal: Safety Maintained with Care  Outcome: Progressing     Problem: Wound  Goal: Optimal Coping  Outcome: Progressing     Problem: Fall Injury Risk  Goal: Absence of Fall and Fall-Related Injury  Outcome: Progressing     Problem: Skin Injury Risk Increased  Goal: Skin Health and Integrity  Outcome: Progressing     Problem: Hip Arthroplasty  Goal: Optimal Coping  Outcome: Progressing     Problem: Comorbidity Management  Goal: Maintenance of COPD Symptom Control  Outcome: Progressing     Problem: Infection  Goal: Absence of Infection Signs and Symptoms  Outcome: Progressing    Pt is AAOx4 with complaints of pain with relief from tylenol. Pt up walking in room with walker. Pt refusing miralax and stool softeners despite no BMx5 days.

## 2024-07-07 NOTE — PLAN OF CARE
Problem: Occupational Therapy  Goal: Occupational Therapy Goal  Description: Goals to be met by: D/C     Patient will increase functional independence with ADLs by performing:    LE Dressing with Set-up Assistance and Assistive Devices as needed.  Grooming while seated with Set-up Assistance. Goal met 7/7/2024  Toileting from toilet with Supervision for hygiene and clothing management.   Step transfer with Supervision. Goal met 7/7/2024  Toilet transfer to toilet with Supervision. Goal met 7/7/2024    Outcome: Progressing   Pt met 3/5 OT goals this date. Continue.

## 2024-07-07 NOTE — PT/OT/SLP PROGRESS
Occupational Therapy   Treatment    Name: Tadeo Riley  MRN: [id number]  Admitting Diagnosis:  History of left hip replacement  4 Days Post-Op  The primary encounter diagnosis was Primary osteoarthritis of left hip. A diagnosis of History of left hip replacement was also pertinent to this visit.  Pre-op Diagnosis: Primary osteoarthritis of left hip [M16.12] s/p Procedure(s):  ARTHROPLASTY, HIP     Recommendations:     Discharge Recommendations: Low Intensity Therapy  Discharge Equipment Recommendations:  bedside commode, bath bench, hip kit, walker, rolling  Barriers to discharge:  None    Assessment:     Tadeo Riley is a 58 y.o. male with a medical diagnosis of History of left hip replacement.  He presents with  Performance deficits affecting function are weakness, impaired endurance, impaired self care skills, impaired functional mobility, gait instability, impaired balance, impaired cardiopulmonary response to activity, orthopedic precautions, decreased ROM.     Pt c/o less pain in L hip. He was found sitting EOB with his brother vito rausch visiting in room. Pt ambulated slowly in room with Supervision with RW to bathroom and back. He met 3/5 OT goals this date. Pt Mod I for g/hygiene seated EOB, toilet tf Supervision with 3 n1 over toilet using RW. Pt performed  bed<>bed t/f Supervision with RW (due to higher seat height) with supervision; bs chair seat too low for for pt's height and it would be difficult to maintain posterior hip precautions seated on low surface. Pt demonstrated good adherence to posterior hip precautions with mobility. He declined LE dressing stating he felt confident with using the hip kit and the more her practices the better he will get. Continue with OT POC     Rehab Prognosis:  Good; patient would benefit from acute skilled OT services to address these deficits and reach maximum level of function.       Plan:     Patient to be seen daily to address the above listed problems via  self-care/home management, therapeutic activities, therapeutic exercises  Plan of Care Expires: 07/12/24  Plan of Care Reviewed with: patient, caregiver, family    Subjective     Chief Complaint: none  Patient/Family Comments/goals: pt agreeable.  Pain/Comfort:  Pain Rating 1:  (did not rate)  Location - Side 1: Left  Location - Orientation 1: generalized  Location 1: hip  Pain Addressed 1: Distraction, Reposition  Pain Rating Post-Intervention 1:  (not complaining, did not rate)    Objective:     Communicated with: nurse prior to session.  Patient found sitting edge of bed with telemetry upon OT entry to room.    General Precautions: Standard, fall, diabetic    Orthopedic Precautions:LLE weight bearing as tolerated, LLE posterior precautions  Braces: N/A  Respiratory Status: Room air     Occupational Performance:     Functional Mobility/Transfers:  Patient completed Sit <> Stand Transfer with supervision  with  rolling walker   Patient completed Toilet Transfer Step Transfer technique with supervision with  rolling walker and 3n1 commode  Functional Mobility: ambulated Supervision with RW to bathroom and back to bed, slow, no LOB, cautious.    Activities of Daily Living:  Grooming: modified independence seated EOB, simulated 2/2 already did with his wife earlier.   Lower Body Dressing: declined with OT    Toileting: declined with OT stated he had just finished voiding in bathroom.        [facility] 6 Click ADL: 21    Treatment & Education:  Purpose of OT and {OC   Pt seen for safe self care and fx mobility as stated above.   All questions/concerns addressed within scope.  Caregiver training with brother vito rausch in above. He verbalized understanding.    Patient left sitting edge of bed with all lines intact, call button in reach, and brother vito law/caregiver present    GOALS:   Multidisciplinary Problems       Occupational Therapy Goals          Problem: Occupational Therapy    Goal Priority Disciplines Outcome  Interventions   Occupational Therapy Goal     OT, PT/OT Progressing    Description: Goals to be met by: D/C     Patient will increase functional independence with ADLs by performing:    LE Dressing with Set-up Assistance and Assistive Devices as needed.  Grooming while seated with Set-up Assistance. Goal met 7/7/2024  Toileting from toilet with Supervision for hygiene and clothing management.   Step transfer with Supervision. Goal met 7/7/2024  Toilet transfer to toilet with Supervision. Goal met 7/7/2024                         Time Tracking:     OT Date of Treatment: 07/07/24  OT Start Time: 1157  OT Stop Time: 1207  OT Total Time (min): 10 min    Billable Minutes:Self Care/Home Management 10  Total Time 10    OT/KENDALL: OT          7/7/2024

## 2024-07-07 NOTE — PROGRESS NOTES
[facility]  Orthopedics  Progress Note    Patient Name: Tadeo Riley  MRN: [id number]  Admission Date: 7/3/2024  Hospital Length of Stay: 0 days  Attending Provider: Michael Holguin MD  Primary Care Provider: Salazar Miranda MD  Follow-up For: Procedure(s) (LRB):  ARTHROPLASTY, HIP (Left)    Post-Operative Day: 3 Days Post-Op  Subjective:     Principal Problem:History of left hip replacement    Principal Orthopedic Problem:  Same    Interval History:  Reports 8/10 pain to left hip this morning.  Worked with PT/OT yesterday but continued difficulty with fatigue/tolerance.  Some difficulty with pain control.   His physiotherapy notes corroborate his report    Review of patient's allergies indicates:   Allergen Reactions    Metformin Hives    Lisinopril Other (See Comments)    Benadryl [diphenhydramine hcl] Rash       Current Facility-Administered Medications   Medication    acetaminophen tablet 1,000 mg    aspirin EC tablet 81 mg    bisacodyL suppository 10 mg    dextrose 10% bolus 125 mL 125 mL    dextrose 10% bolus 250 mL 250 mL    DULoxetine DR capsule 30 mg    famotidine tablet 20 mg    furosemide tablet 40 mg    gabapentin capsule 300 mg    glucagon (human recombinant) injection 1 mg    glucose chewable tablet 16 g    glucose chewable tablet 24 g    HYDROmorphone injection 0.5 mg    insulin aspart U-100 pen 0-5 Units    insulin glargine U-100 (Lantus) pen 20 Units    lactulose 20 gram/30 mL solution Soln 20 g    losartan tablet 100 mg    ondansetron disintegrating tablet 4 mg    ondansetron injection 4 mg    oxyCODONE immediate release tablet 10 mg    oxyCODONE immediate release tablet 5 mg    polyethylene glycol packet 17 g    pravastatin tablet 40 mg    senna-docusate 8.6-50 mg per tablet 1 tablet    sodium chloride 0.9% flush 3 mL    sodium chloride 0.9% flush 5 mL    spironolactone tablet 50 mg    zolpidem tablet 5 mg     Objective:     Vital Signs (Most Recent):  Temp: 98 °F (36.7 °C) (07/07/24  "0801)  Pulse: 92 (07/07/24 0801)  Resp: 18 (07/07/24 0801)  BP: 139/63 (07/07/24 0801)  SpO2: (!) 93 % (07/07/24 0801) Vital Signs (24h Range):  Temp:  [97.3 °F (36.3 °C)-99.3 °F (37.4 °C)] 98 °F (36.7 °C)  Pulse:  [81-99] 92  Resp:  [18-20] 18  SpO2:  [93 %-98 %] 93 %  BP: (111-139)/(57-70) 139/63     Weight: (!) 154.4 kg (340 lb 6.2 oz)  Height: 5' 11" (180.3 cm)  Body mass index is 47.47 kg/m².      Intake/Output Summary (Last 24 hours) at 7/7/2024 0830  Last data filed at 7/7/2024 0427  Gross per 24 hour   Intake 120 ml   Output 1050 ml   Net -930 ml       Ortho/SPM Exam    Appears alert and comfortable  Dressing dry and intact  Motor intact EHL/FHL/TA/GS    Significant Labs: None    Significant Imaging: None    Assessment/Plan:     Active Diagnoses:    Diagnosis Date Noted POA    PRINCIPAL PROBLEM:  History of left hip replacement [Z96.642] 07/03/2024 Not Applicable      Problems Resolved During this Admission:   Left hip is normal for fresh postop.  Mobilization is limited by the patient's obesity and deconditioning.  Once the patient can ambulate safely with a walker, he may be discharged home.  Currently not safe for discharge at this time.  Continue inpatient management for pain control/mobilization for safe discharge.  Plan for likely discharge tomorrow    Continue sliding scale for hyperglycemia      Hyacinth Linares MD PGY-3  Orthopedics  [facility]  "

## 2024-07-08 VITALS
BODY MASS INDEX: 44.1 KG/M2 | DIASTOLIC BLOOD PRESSURE: 63 MMHG | OXYGEN SATURATION: 95 % | SYSTOLIC BLOOD PRESSURE: 135 MMHG | HEIGHT: 71 IN | WEIGHT: 315 LBS | TEMPERATURE: 98 F | HEART RATE: 86 BPM | RESPIRATION RATE: 18 BRPM

## 2024-07-08 LAB
POCT GLUCOSE: 108 MG/DL (ref 70–110)
POCT GLUCOSE: 251 MG/DL (ref 70–110)
POCT GLUCOSE: 264 MG/DL (ref 70–110)

## 2024-07-08 PROCEDURE — 99900035 HC TECH TIME PER 15 MIN (STAT)

## 2024-07-08 PROCEDURE — 97530 THERAPEUTIC ACTIVITIES: CPT | Mod: CO

## 2024-07-08 PROCEDURE — 94761 N-INVAS EAR/PLS OXIMETRY MLT: CPT

## 2024-07-08 PROCEDURE — 97535 SELF CARE MNGMENT TRAINING: CPT | Mod: CO

## 2024-07-08 PROCEDURE — 97116 GAIT TRAINING THERAPY: CPT | Mod: CQ

## 2024-07-08 PROCEDURE — 25000003 PHARM REV CODE 250: Performed by: ORTHOPAEDIC SURGERY

## 2024-07-08 PROCEDURE — A4216 STERILE WATER/SALINE, 10 ML: HCPCS | Performed by: ORTHOPAEDIC SURGERY

## 2024-07-08 RX ADMIN — ACETAMINOPHEN 1000 MG: 500 TABLET ORAL at 08:07

## 2024-07-08 RX ADMIN — INSULIN GLARGINE 20 UNITS: 100 INJECTION, SOLUTION SUBCUTANEOUS at 08:07

## 2024-07-08 RX ADMIN — FAMOTIDINE 20 MG: 20 TABLET ORAL at 08:07

## 2024-07-08 RX ADMIN — PRAVASTATIN SODIUM 40 MG: 40 TABLET ORAL at 08:07

## 2024-07-08 RX ADMIN — ASPIRIN 81 MG: 81 TABLET, COATED ORAL at 08:07

## 2024-07-08 RX ADMIN — OXYCODONE 10 MG: 5 TABLET ORAL at 11:07

## 2024-07-08 RX ADMIN — SENNOSIDES AND DOCUSATE SODIUM 1 TABLET: 50; 8.6 TABLET ORAL at 08:07

## 2024-07-08 RX ADMIN — FUROSEMIDE 40 MG: 40 TABLET ORAL at 08:07

## 2024-07-08 RX ADMIN — SPIRONOLACTONE 50 MG: 25 TABLET, FILM COATED ORAL at 08:07

## 2024-07-08 RX ADMIN — INSULIN ASPART 3 UNITS: 100 INJECTION, SOLUTION INTRAVENOUS; SUBCUTANEOUS at 11:07

## 2024-07-08 RX ADMIN — Medication 3 ML: at 05:07

## 2024-07-08 RX ADMIN — INSULIN ASPART 3 UNITS: 100 INJECTION, SOLUTION INTRAVENOUS; SUBCUTANEOUS at 08:07

## 2024-07-08 RX ADMIN — Medication 3 ML: at 01:07

## 2024-07-08 RX ADMIN — LOSARTAN POTASSIUM 100 MG: 50 TABLET, FILM COATED ORAL at 08:07

## 2024-07-08 NOTE — PLAN OF CARE
SW met with Pt and Pt spouse Nyla [phone] at bedside. Pt participated in joint camp. Pt demographics confirmed. Pt independent with ADL's prior to surgery. Pt has a bsc, rolling walker, and a tub bench. Pt HHS set up with [facility]. Pt not a dialysis or Coumadin Pt. Pt lives with spouse. Pt spouse will transport Pt home at D/C. No other needs identified. SW to request f/u as needed. SW to assist with any future needs.     Future Appointments   Date Time Provider Department Center   7/25/2024  8:45 AM [facility] ORTHO CLINIC LIMIT 350LBS [facility] [place] [facility]   7/25/2024  9:15 AM Michael Holguin MD [facility] ORTHO [place] [facility]       [place] - Med Surg  Discharge Assessment    Primary Care Provider: Salazar Miranda MD     Discharge Assessment (most recent)       BRIEF DISCHARGE ASSESSMENT - 07/08/24 1127          Discharge Planning    Assessment Type Discharge Planning Brief Assessment (P)      Resource/Environmental Concerns none (P)      Support Systems Spouse/significant other (P)      Equipment Currently Used at Home walker, rolling;bedside commode;bath bench (P)      Current Living Arrangements home (P)      Patient/Family Anticipates Transition to home;home with family (P)      Patient/Family Anticipated Services at Transition none (P)      DME Needed Upon Discharge  none (P)      Discharge Plan A Home;Home with family (P)      Discharge Plan B Home Health (P)         Physical Activity    On average, how many days per week do you engage in moderate to strenuous exercise (like a brisk walk)? 0 days (P)      On average, how many minutes do you engage in exercise at this level? 0 min (P)         Financial Resource Strain    How hard is it for you to pay for the very basics like food, housing, medical care, and heating? Not very hard (P)         Housing Stability    In the last 12 months, was there a time when you were not able to pay the mortgage or rent on time? No (P)      At any time in the past 12  months, were you homeless or living in a shelter (including now)? No (P)         Transportation Needs    Has the lack of transportation kept you from medical appointments, meetings, work or from getting things needed for daily living? No (P)         Food Insecurity    Within the past 12 months, you worried that your food would run out before you got the money to buy more. Never true (P)      Within the past 12 months, the food you bought just didn't last and you didn't have money to get more. Never true (P)         Stress    Do you feel stress - tense, restless, nervous, or anxious, or unable to sleep at night because your mind is troubled all the time - these days? Not at all (P)         Social Isolation    How often do you feel lonely or isolated from those around you?  Never (P)         Utilities    In the past 12 months has the electric, gas, oil, or water company threatened to shut off services in your home? No (P)         Health Literacy    How often do you need to have someone help you when you read instructions, pamphlets, or other written material from your doctor or pharmacy? Never (P)                          Basia Higuera LMSW  Phone: [phone]  [facility]

## 2024-07-08 NOTE — PROGRESS NOTES
Virtual Nurse:Discharge orders noted; additional clinical references attached.  and pharmacy tech notified.  Patient's discharge instruction packet given by bedside RN.    Cued into patient's room.  Permission received per patient to turn camera to view patient.  Introduced as VN that will be instructing on discharge instructions.  Family member at bedside.  Educated patient and his wife Nyla, on reason for admission; medications to hold, continue, and start, appointment to follow-up with doctor, and when to return to ED. Teach back method used. Verbalized understanding  Number given for 24/7 Nurse Line. Opportunity given for questions and questions answered.  Bedside nurse updated. Transport to [facility] requested.        07/08/24 1223   Shift   Virtual Nurse - Patient Verbalized Approval Of Camera Use;VN Rounding   Type of Frequent Check   Type Patient Rounds   Safety/Activity   Patient Rounds visualized patient   Safety Promotion/Fall Prevention Fall Risk reviewed with patient/family

## 2024-07-08 NOTE — PLAN OF CARE
Pt clear to D/C home today. Pt on [facility] with [facility]. Pt has bsc, tub bench, and a rolling walker. Pt spouse Nyla at bedside to transport Pt home. F/U appts have scheduled. No other D/C needs identified. Pt clear from CM.     Future Appointments   Date Time Provider Department Center   7/25/2024  8:45 AM [facility] ORTHO CLINIC LIMIT 350LBS [facility] [place] [facility]   7/25/2024  9:15 AM Michael Holguin MD [facility] ORTHO [place] [facility]         [place] - Med Surg  Discharge Final Note    Primary Care Provider: Salazar Miranda MD    Expected Discharge Date: 7/8/2024    Final Discharge Note (most recent)       Final Note - 07/08/24 1134          Final Note    Assessment Type Final Discharge Note (P)      Anticipated Discharge Disposition Home or Self Care (P)      What phone number can be called within the next 1-3 days to see how you are doing after discharge? [phone] (P)      Hospital Resources/Appts/Education Provided Appointments scheduled and added to AVS (P)         Post-Acute Status    Post-Acute Authorization Home Health;HME (P)      HME Status Set-up Complete/Auth obtained (P)      Home Health Status Set-up Complete/Auth obtained (P)      Discharge Delays None known at this time (P)                      Important Message from Medicare             Contact Info       Michael Holguin MD   Specialty: Orthopedic Surgery    [address]  [address]  [facility] [address]   Phone: [phone]       Next Steps: Follow up in 2 week(s)    [facility] [address] [address]   Phone: [phone]       Next Steps: Call    Instructions: As needed, HOME HEALTH, OFFICE TO CALL PATIENT/FAMILY TO SET UP APPT TIME              Basia Higuera LMSW  Phone: [phone]  [facility]

## 2024-07-08 NOTE — PLAN OF CARE
Problem: Occupational Therapy  Goal: Occupational Therapy Goal  Description: Goals to be met by: D/C     Patient will increase functional independence with ADLs by performing:    LE Dressing with Set-up Assistance and Assistive Devices as needed.  Grooming while seated with Set-up Assistance. Goal met 7/7/2024  Toileting from toilet with Supervision for hygiene and clothing management.   Step transfer with Supervision. Goal met 7/7/2024  Toilet transfer to toilet with Supervision. Goal met 7/7/2024    Outcome: Deana Riley is a 58 y.o. male with a medical diagnosis of History of left hip replacement.  Performance deficits affecting function are weakness, impaired endurance, impaired self care skills, impaired functional mobility, gait instability, pain, impaired balance, decreased lower extremity function, decreased safety awareness, impaired skin, edema, orthopedic precautions.    Pt found in bed, agreeable to therapy.  Pt is progressing well towards goals.  Pain improving. Continue OT services to address functional goals, progressing as able.

## 2024-07-08 NOTE — PLAN OF CARE
Problem: Physical Therapy  Goal: Physical Therapy Goal  Description: Goals to be met by: 2024     Patient will increase functional independence with mobility by performin. Supine to sit with Modified [place]  2. Sit to supine with Modified [place]  3. Sit to stand transfer with Modified [place]  4. Gait  x 150 feet with Modified [place] using Rolling Walker.   5. Ascend/descend x3 steps with Rolling Walker as BHR with Stand by assistance.    Outcome: Progressing

## 2024-07-08 NOTE — PT/OT/SLP PROGRESS
Physical Therapy Treatment    Patient Name:  Tadeo Riley   MRN:  [id number]    Recommendations:     Discharge Recommendations: Low Intensity Therapy  Discharge Equipment Recommendations: none (RW in room)  Barriers to discharge:  decreased mobility,strength and endurance    Assessment:     Tadeo Riley is a 58 y.o. male admitted with a medical diagnosis of History of left hip replacement.  He presents with the following impairments/functional limitations: weakness, impaired endurance, impaired functional mobility, gait instability, impaired balance, decreased lower extremity function, decreased ROM, impaired coordination, impaired skin, orthopedic precautions,pt with improving mobility and will benefit from low intensity therapy upon discharge.    Rehab Prognosis: Good; patient would benefit from acute skilled PT services to address these deficits and reach maximum level of function.    Recent Surgery: Procedure(s) (LRB):  ARTHROPLASTY, HIP (Left) 5 Days Post-Op    Plan:     During this hospitalization, patient to be seen BID to address the identified rehab impairments via gait training, therapeutic activities, therapeutic exercises, neuromuscular re-education and progress toward the following goals:    Plan of Care Expires:  07/11/24    Subjective     Chief Complaint: n/a  Patient/Family Comments/goals: pt is leaving today.  Pain/Comfort:  Pain Rating 1:  (no rating)  Location - Side 1: Left  Location - Orientation 1: generalized  Location 1: hip  Pain Addressed 1: Reposition, Distraction      Objective:     Communicated with nsg prior to session.  Patient found sitting edge of bed with peripheral IV, telemetry upon PT entry to room.     General Precautions: Standard, diabetic, fall  Orthopedic Precautions: LLE weight bearing as tolerated, LLE posterior precautions  Braces: N/A  Respiratory Status: Room air     Functional Mobility:  Transfers:     Sit to Stand:  supervision with rolling walker  Gait: amb ~40'  with RW and S  Balance: fair standing balance with RW      AM-PAC 6 CLICK MOBILITY  Turning over in bed (including adjusting bedclothes, sheets and blankets)?: 4  Sitting down on and standing up from a chair with arms (e.g., wheelchair, bedside commode, etc.): 4  Moving from lying on back to sitting on the side of the bed?: 4  Moving to and from a bed to a chair (including a wheelchair)?: 3  Need to walk in hospital room?: 3  Climbing 3-5 steps with a railing?: 3  Basic Mobility Total Score: 21       Treatment & Education:reviewed pt's precautions.      Patient left sitting edge of bed with all lines intact, call button in reach, and spouse present..    GOALS: see general POC  Multidisciplinary Problems       Physical Therapy Goals          Problem: Physical Therapy    Goal Priority Disciplines Outcome Goal Variances Interventions   Physical Therapy Goal     PT, PT/OT Progressing     Description: Goals to be met by: 2024     Patient will increase functional independence with mobility by performin. Supine to sit with Modified [place]  2. Sit to supine with Modified [place]  3. Sit to stand transfer with Modified [place]  4. Gait  x 150 feet with Modified [place] using Rolling Walker.   5. Ascend/descend x3 steps with Rolling Walker as BHR with Stand by assistance.                         Time Tracking:     PT Received On: 24  PT Start Time: 1012     PT Stop Time: 1024  PT Total Time (min): 12 min     Billable Minutes: Gait Training 12    Treatment Type: Treatment  PT/PTA: PTA     Number of PTA visits since last PT visit: 2     2024

## 2024-07-08 NOTE — PT/OT/SLP PROGRESS
Occupational Therapy   Treatment    Name: Tadeo Riley  MRN: [id number]  Admitting Diagnosis:  History of left hip replacement  5 Days Post-Op    Recommendations:     Discharge Recommendations: Low Intensity Therapy  Discharge Equipment Recommendations:  none  Barriers to discharge:  None    Assessment:     Tadeo Riley is a 58 y.o. male with a medical diagnosis of History of left hip replacement.  Performance deficits affecting function are weakness, impaired endurance, impaired self care skills, impaired functional mobility, gait instability, pain, impaired balance, decreased lower extremity function, decreased safety awareness, impaired skin, edema, orthopedic precautions.    Pt found in bed, agreeable to therapy.  Pt is progressing well towards goals.  Pain improving. Continue OT services to address functional goals, progressing as able.      Rehab Prognosis:  Good; patient would benefit from acute skilled OT services to address these deficits and reach maximum level of function.       Plan:     Patient to be seen daily to address the above listed problems via self-care/home management, therapeutic activities, therapeutic exercises  Plan of Care Expires: 07/12/24  Plan of Care Reviewed with: patient, spouse    Subjective     Chief Complaint: pain   Patient/Family Comments/goals: to go home   Pain/Comfort:  Pain Rating 1: 7/10  Location - Side 1: Left  Location - Orientation 1: generalized  Location 1: hip  Pain Addressed 1: Distraction (rec ice pack)  Pain Rating Post-Intervention 1: 7/10    Objective:     Communicated with: nurse prior to session.  Patient found HOB elevated with bed alarm, peripheral IV, telemetry upon OT entry to room.    General Precautions: Standard, fall, diabetic    Orthopedic Precautions:LLE weight bearing as tolerated, LLE posterior precautions  Braces: N/A  Respiratory Status: Room air     Occupational Performance:     Bed Mobility:    Patient completed Scooting/Bridging with  modified independence  Patient completed Supine to Sit with minimum assistance, bed positioned flat w/ no bed rails in preparation for bed mobility at home.        Functional Mobility/Transfers:  Patient completed Sit <> Stand Transfer with modified independence  with  rolling walker   Patient completed Toilet Transfer Stand Pivot technique with modified independence with  rolling walker and bedside commode  Functional Mobility: Pt ambulated room distances at Mod I level using RW.     Activities of Daily Living:  Grooming: modified independence standing at sink   Toileting: modified independence standing to urinate      [facility] 6 Click ADL: 21    Treatment & Education:  Educated pt and pts spouse on:   -home environment modifications to increase safety, reduce risk of fall as well as increase pt independence in home environment   -RW safety/mgmt during transfers and BADL's   -DME recommendations and use of- RW, BSC, TTB   -LB drsg technique using AE: reacher, sock aid. Pt declined stating spouse will assist.    -Posterior hip precautions and maintaining during BADL's and mobility.  Pt recalls 3/3 hip precautions.       Patient left sitting edge of bed with all lines intact, call button in reach, and spouse present    GOALS:   Multidisciplinary Problems       Occupational Therapy Goals          Problem: Occupational Therapy    Goal Priority Disciplines Outcome Interventions   Occupational Therapy Goal     OT, PT/OT Progressing    Description: Goals to be met by: D/C     Patient will increase functional independence with ADLs by performing:    LE Dressing with Set-up Assistance and Assistive Devices as needed.  Grooming while seated with Set-up Assistance. Goal met 7/7/2024  Toileting from toilet with Supervision for hygiene and clothing management.   Step transfer with Supervision. Goal met 7/7/2024  Toilet transfer to toilet with Supervision. Goal met 7/7/2024                         Time Tracking:     OT Date of  Treatment: 07/08/24  OT Start Time: 0945  OT Stop Time: 1011  OT Total Time (min): 26 min    Billable Minutes:Self Care/Home Management 15  Therapeutic Activity 11               7/8/2024

## 2024-07-19 ENCOUNTER — TELEPHONE (OUTPATIENT)
Dept: ORTHOPEDICS | Facility: CLINIC | Age: 59
End: 2024-07-19
Payer: MEDICARE

## 2024-07-19 NOTE — TELEPHONE ENCOUNTER
----- Message from Wilfrid Miller sent at 7/19/2024  9:10 AM CDT -----  Type:  Needs Medical Advice    Who Called: Jonas with [facility].    Would the patient rather a call back or a response via [employer]? call  Best Call Back Number: [phone]  Additional Information: Requesting a verbal to continue Home health Physical Therapy

## 2024-07-19 NOTE — TELEPHONE ENCOUNTER
Spoke with Michael, with HH, requesting to extend home health for two weeks for twice a week as Mr Rodriguez has rescheduled his post op appointment to 08/01 due to transportation. Per Michael, pt wife is unable to bring him to his post op appointment on 07/25.  was informed okay to extend for now. In addition, patient is doing well bandage is in tack and no drainage.     Mr. Riley is 2 weeks post op left JUVE. Approval was given to extend post op another week - per MD a week out. However, post op has now been rescheduled to 08/01.

## 2024-07-19 NOTE — TELEPHONE ENCOUNTER
Spoke with Mr. Rodriguez with HH, okay to removal Aquacel bandage at next visit- per DAVID Rodriguez v/u

## 2024-07-22 DIAGNOSIS — Z96.642 S/P TOTAL LEFT HIP ARTHROPLASTY: ICD-10-CM

## 2024-07-22 DIAGNOSIS — G89.18 POSTOPERATIVE PAIN: ICD-10-CM

## 2024-07-22 DIAGNOSIS — M16.12 PRIMARY OSTEOARTHRITIS OF LEFT HIP: Primary | ICD-10-CM

## 2024-07-22 NOTE — TELEPHONE ENCOUNTER
----- Message from Nikkie Kidd sent at 7/22/2024  1:23 PM CDT -----  Type:  Needs Medical Advice/REFILLS     Who Called: pt    Pharmacy name and phone #:  Telephone Fax  [phone]    Pharmacy Address and Hours    Address Hours  [address]  [facility] [address]       Would the patient rather a call back or a response via [facility]? Call     Best Call Back Number: [phone]    Additional Information: oxyCODONE-acetaminophen (PERCOCET) 5-325 mg per tablet contact patient when submitted

## 2024-07-23 RX ORDER — OXYCODONE AND ACETAMINOPHEN 5; 325 MG/1; MG/1
1 TABLET ORAL EVERY 8 HOURS PRN
Qty: 45 TABLET | Refills: 0 | Status: SHIPPED | OUTPATIENT
Start: 2024-07-23

## 2024-07-23 NOTE — TELEPHONE ENCOUNTER
Spoke with , pt would like script to go to [facility].Pharmacy has been updated.     Thanks!

## 2024-08-01 ENCOUNTER — OFFICE VISIT (OUTPATIENT)
Dept: ORTHOPEDICS | Facility: CLINIC | Age: 59
End: 2024-08-01
Payer: MEDICARE

## 2024-08-01 ENCOUNTER — HOSPITAL ENCOUNTER (OUTPATIENT)
Dept: RADIOLOGY | Facility: HOSPITAL | Age: 59
Discharge: HOME | End: 2024-08-01
Attending: ORTHOPAEDIC SURGERY
Payer: MEDICARE

## 2024-08-01 VITALS — HEIGHT: 71 IN | BODY MASS INDEX: 44.1 KG/M2 | WEIGHT: 315 LBS

## 2024-08-01 DIAGNOSIS — Z96.642 S/P TOTAL LEFT HIP ARTHROPLASTY: ICD-10-CM

## 2024-08-01 DIAGNOSIS — M16.12 PRIMARY OSTEOARTHRITIS OF LEFT HIP: Primary | ICD-10-CM

## 2024-08-01 PROCEDURE — 1159F MED LIST DOCD IN RCRD: CPT | Mod: CPTII,S$GLB,, | Performed by: ORTHOPAEDIC SURGERY

## 2024-08-01 PROCEDURE — 1160F RVW MEDS BY RX/DR IN RCRD: CPT | Mod: CPTII,S$GLB,, | Performed by: ORTHOPAEDIC SURGERY

## 2024-08-01 PROCEDURE — 3051F HG A1C>EQUAL 7.0%<8.0%: CPT | Mod: CPTII,S$GLB,, | Performed by: ORTHOPAEDIC SURGERY

## 2024-08-01 PROCEDURE — 73502 X-RAY EXAM HIP UNI 2-3 VIEWS: CPT | Mod: TC,PN,LT

## 2024-08-01 PROCEDURE — 73502 X-RAY EXAM HIP UNI 2-3 VIEWS: CPT | Mod: 26,LT,, | Performed by: RADIOLOGY

## 2024-08-01 PROCEDURE — 99024 POSTOP FOLLOW-UP VISIT: CPT | Mod: S$GLB,,, | Performed by: ORTHOPAEDIC SURGERY

## 2024-08-01 PROCEDURE — 99999 PR PBB SHADOW E&M-EST. PATIENT-LVL IV: CPT | Mod: PBBFAC,,, | Performed by: ORTHOPAEDIC SURGERY

## 2024-08-01 NOTE — PROGRESS NOTES
Subjective:      Patient ID: Tadeo Riley is a 58 y.o. male.    Chief Complaint: Post-op Evaluation of the Left Hip      HPI:  About 1 month postop  The patient is seen for postop follow-up of left  JUVE.  Pain control has been satisfactory  They feel that they are ambulating easily  Postoperative complaints include:  None at this time      Current Outpatient Medications:     aspirin (ECOTRIN) 81 MG EC tablet, Take 81 mg by mouth once daily., Disp: , Rfl:     blood-glucose meter (BLOOD GLUCOSE MONITORING) kit, Use as instructed, Disp: 1 each, Rfl: 0    empagliflozin (JARDIANCE) 25 mg tablet, Jardiance 25 mg tablet  TAKE 1 TABLET BY MOUTH EVERY DAY, Disp: , Rfl:     furosemide (LASIX) 40 MG tablet, Take 40 mg by mouth once daily., Disp: , Rfl:     gabapentin (NEURONTIN) 300 MG capsule, Take 1-2 capsules (300-600 mg total) by mouth every evening., Disp: 60 capsule, Rfl: 11    insulin NPH-insulin regular, 70/30, (NOVOLIN 70/30 U-100 INSULIN) 100 unit/mL (70-30) injection, Inject 50 units twice a day by subcutaneous route. (Patient taking differently: Inject 50 Units into the skin 2 (two) times daily. Inject 50 units twice a day by subcutaneous route.), Disp: 30 mL, Rfl: 11    LANTUS U-100 INSULIN 100 unit/mL injection, INJECT 40 UNITS EVERY DAY BY SUBCUTANEOUS ROUTE AT BEDTIME FOR 30 DAYS., Disp: , Rfl:     liraglutide 0.6 mg/0.1 mL, 18 mg/3 mL, subq PNIJ (VICTOZA 2-DANIEL) 0.6 mg/0.1 mL (18 mg/3 mL) PnIj pen, Inject 0.6 mg into the skin once daily., Disp: , Rfl:     naproxen (NAPROSYN) 500 MG tablet, Take 1 tablet (500 mg total) by mouth 2 (two) times daily with meals., Disp: 60 tablet, Rfl: 1    oxyCODONE-acetaminophen (PERCOCET) 5-325 mg per tablet, Take 1 tablet by mouth every 8 (eight) hours as needed for Pain., Disp: 45 tablet, Rfl: 0    pravastatin (PRAVACHOL) 40 MG tablet, Take 40 mg by mouth once daily., Disp: , Rfl:     tadalafiL (CIALIS) 10 MG tablet, Take 10 mg by mouth daily as needed., Disp: , Rfl:      "vitamin D (VITAMIN D3) 1000 units Tab, Take by mouth., Disp: , Rfl:     diclofenac sodium (VOLTAREN) 1 % Gel, Apply 2 g topically 4 (four) times daily. (Patient not taking: Reported on 6/11/2024), Disp: 20 g, Rfl: 0    DULoxetine (CYMBALTA) 30 MG capsule, Take 30 mg by mouth nightly. (Patient not taking: Reported on 6/11/2024), Disp: , Rfl:     FARXIGA 10 mg tablet, TAKE 1 TABLET(S) BY MOUTH EVERY DAY (Patient not taking: Reported on 6/11/2024), Disp: , Rfl:     losartan-hydrochlorothiazide 100-25 mg (HYZAAR) 100-25 mg per tablet, Take 1 tablet by mouth once daily., Disp: 90 tablet, Rfl: 3    spironolactone (ALDACTONE) 50 MG tablet, Take 50 mg by mouth once daily. (Patient not taking: Reported on 8/1/2024), Disp: , Rfl:   Review of patient's allergies indicates:   Allergen Reactions    Metformin Hives    Lisinopril Other (See Comments)    Benadryl [diphenhydramine hcl] Rash       Ht 5' 11" (1.803 m)   Wt (!) 154.4 kg (340 lb 6.2 oz)   BMI 47.47 kg/m²     ROS        Objective:    Ortho Exam          Alert, oriented, no acute distress  Sclera-Normal  Respiratory distress-none  Gait:  Mild limp with crutches  Wound:  Cleanly healed  Range of motion:  Painless  Distal perfusion:  Intact  Distal neurologic:  Intact    Imaging:  Pending      Assessment:             1. Primary osteoarthritis of left hip    2. S/P total left hip arthroplasty        The patient has left hip is healing appropriately after the procedure.  His mobility limitation is mostly on the basis of his right hip at this point.        Plan:          No follow-ups on file.    Explained my assessment    We will progress to outpatient physical therapy    I anticipate the patient will need 1-2 months out of work going forward as he works driving large trucks.    I advised the patient to wean off of his prescription pain medicine and use over-the-counter medication going forward          "

## 2024-08-02 ENCOUNTER — TELEPHONE (OUTPATIENT)
Dept: ORTHOPEDICS | Facility: CLINIC | Age: 59
End: 2024-08-02
Payer: MEDICARE

## 2024-08-05 PROBLEM — Z78.9 IMPAIRED ACTIVITIES OF DAILY LIVING: Status: ACTIVE | Noted: 2024-08-05

## 2024-08-05 PROBLEM — M25.652 IMPAIRED RANGE OF MOTION OF LEFT HIP: Status: ACTIVE | Noted: 2024-08-05

## 2024-08-05 PROBLEM — R26.89 IMPAIRED GAIT AND MOBILITY: Status: ACTIVE | Noted: 2024-08-05

## 2024-08-05 PROBLEM — R29.898 IMPAIRED STRENGTH OF LOWER EXTREMITY: Status: ACTIVE | Noted: 2024-08-05

## 2024-08-09 ENCOUNTER — DOCUMENT SCAN (OUTPATIENT)
Dept: HOME HEALTH SERVICES | Facility: HOSPITAL | Age: 59
End: 2024-08-09
Payer: MEDICARE

## 2024-08-12 ENCOUNTER — EXTERNAL HOME HEALTH (OUTPATIENT)
Dept: HOME HEALTH SERVICES | Facility: HOSPITAL | Age: 59
End: 2024-08-12
Payer: MEDICARE

## 2024-09-12 ENCOUNTER — OFFICE VISIT (OUTPATIENT)
Dept: ORTHOPEDICS | Facility: CLINIC | Age: 59
End: 2024-09-12
Payer: MEDICARE

## 2024-09-12 ENCOUNTER — TELEPHONE (OUTPATIENT)
Dept: ORTHOPEDICS | Facility: CLINIC | Age: 59
End: 2024-09-12

## 2024-09-12 DIAGNOSIS — M16.12 PRIMARY OSTEOARTHRITIS OF LEFT HIP: Primary | ICD-10-CM

## 2024-09-12 DIAGNOSIS — Z96.642 S/P TOTAL LEFT HIP ARTHROPLASTY: ICD-10-CM

## 2024-09-12 PROCEDURE — 3051F HG A1C>EQUAL 7.0%<8.0%: CPT | Mod: CPTII,S$GLB,, | Performed by: ORTHOPAEDIC SURGERY

## 2024-09-12 PROCEDURE — 1159F MED LIST DOCD IN RCRD: CPT | Mod: CPTII,S$GLB,, | Performed by: ORTHOPAEDIC SURGERY

## 2024-09-12 PROCEDURE — 99024 POSTOP FOLLOW-UP VISIT: CPT | Mod: S$GLB,,, | Performed by: ORTHOPAEDIC SURGERY

## 2024-09-12 PROCEDURE — 99999 PR PBB SHADOW E&M-EST. PATIENT-LVL III: CPT | Mod: PBBFAC,,, | Performed by: ORTHOPAEDIC SURGERY

## 2024-09-12 PROCEDURE — 1160F RVW MEDS BY RX/DR IN RCRD: CPT | Mod: CPTII,S$GLB,, | Performed by: ORTHOPAEDIC SURGERY

## 2024-09-12 RX ORDER — DULAGLUTIDE 1.5 MG/.5ML
INJECTION, SOLUTION SUBCUTANEOUS
COMMUNITY
Start: 2024-08-05

## 2024-09-12 RX ORDER — IBUPROFEN 800 MG/1
TABLET ORAL
COMMUNITY
Start: 2024-08-23

## 2024-09-12 NOTE — TELEPHONE ENCOUNTER
Work note faxed to [facility] at [phone] and [facility] at [phone].

## 2024-09-12 NOTE — LETTER
09/12/2024    To whom it may concern:    Tadeo Riley is under my medical care. He was seen today.    The patient may not return to return to work for 1 more month from today.     Yours truly,        Michael Holguin MD

## 2024-09-12 NOTE — PROGRESS NOTES
Subjective:      Patient ID: Tadeo Riley is a 58 y.o. male.    Chief Complaint: Post-op Evaluation (2 mth p/o- left JUVE )      HPI:  Two months postop  The patient is seen for postop follow-up of left  JUVE.  Pain control has been satisfactory  They feel that they are ambulating with difficulty  Postoperative complaints include:  The patient complains of persistent right hip stiffness and a sense of weakness and poor balance such that he does not feel safe returning to his work as a  quite yet.      Current Outpatient Medications:     aspirin (ECOTRIN) 81 MG EC tablet, Take 81 mg by mouth once daily., Disp: , Rfl:     blood-glucose meter (BLOOD GLUCOSE MONITORING) kit, Use as instructed, Disp: 1 each, Rfl: 0    dulaglutide (TRULICITY) 1.5 mg/0.5 mL pen injector, Inject 0.5 mL every week by subcutaneous route., Disp: , Rfl:     DULoxetine (CYMBALTA) 30 MG capsule, Take 30 mg by mouth nightly., Disp: , Rfl:     empagliflozin (JARDIANCE) 25 mg tablet, Jardiance 25 mg tablet  TAKE 1 TABLET BY MOUTH EVERY DAY, Disp: , Rfl:     FARXIGA 10 mg tablet, , Disp: , Rfl:     furosemide (LASIX) 40 MG tablet, Take 40 mg by mouth once daily., Disp: , Rfl:     gabapentin (NEURONTIN) 300 MG capsule, Take 1-2 capsules (300-600 mg total) by mouth every evening., Disp: 60 capsule, Rfl: 11    ibuprofen (ADVIL,MOTRIN) 800 MG tablet, TAKE ONE TABLET BY MOUTH THREE TIMES DAILY @ 9AM, 1PM, & 5PM, Disp: , Rfl:     insulin NPH-insulin regular, 70/30, (NOVOLIN 70/30 U-100 INSULIN) 100 unit/mL (70-30) injection, Inject 50 units twice a day by subcutaneous route. (Patient taking differently: Inject 50 Units into the skin 2 (two) times daily. Inject 50 units twice a day by subcutaneous route.), Disp: 30 mL, Rfl: 11    LANTUS U-100 INSULIN 100 unit/mL injection, INJECT 40 UNITS EVERY DAY BY SUBCUTANEOUS ROUTE AT BEDTIME FOR 30 DAYS., Disp: , Rfl:     liraglutide 0.6 mg/0.1 mL, 18 mg/3 mL, subq PNIJ (VICTOZA 2-DANIEL) 0.6 mg/0.1 mL (18  mg/3 mL) PnIj pen, Inject 0.6 mg into the skin once daily., Disp: , Rfl:     naproxen (NAPROSYN) 500 MG tablet, Take 1 tablet (500 mg total) by mouth 2 (two) times daily with meals., Disp: 60 tablet, Rfl: 1    pravastatin (PRAVACHOL) 40 MG tablet, Take 40 mg by mouth once daily., Disp: , Rfl:     spironolactone (ALDACTONE) 50 MG tablet, Take 50 mg by mouth once daily., Disp: , Rfl:     tadalafiL (CIALIS) 10 MG tablet, Take 10 mg by mouth daily as needed., Disp: , Rfl:     vitamin D (VITAMIN D3) 1000 units Tab, Take by mouth., Disp: , Rfl:     diclofenac sodium (VOLTAREN) 1 % Gel, Apply 2 g topically 4 (four) times daily. (Patient not taking: Reported on 9/12/2024), Disp: 20 g, Rfl: 0    losartan-hydrochlorothiazide 100-25 mg (HYZAAR) 100-25 mg per tablet, Take 1 tablet by mouth once daily., Disp: 90 tablet, Rfl: 3    oxyCODONE-acetaminophen (PERCOCET) 5-325 mg per tablet, Take 1 tablet by mouth every 8 (eight) hours as needed for Pain. (Patient not taking: Reported on 9/12/2024), Disp: 45 tablet, Rfl: 0  Review of patient's allergies indicates:   Allergen Reactions    Metformin Hives    Lisinopril Other (See Comments)    Benadryl [diphenhydramine hcl] Rash       There were no vitals taken for this visit.    ROS        Objective:    Ortho Exam          Alert, oriented, no acute distress  Sclera-Normal  Respiratory distress-none  Gait:  Moderate limp  Wound:  Normally healed  Range of motion:  Painless  Distal perfusion:  Intact  Distal neurologic:  Intact    Imaging:  None today      Assessment:             1. Primary osteoarthritis of left hip    2. S/P total left hip arthroplasty        Left total hip is healed well.  The patient has chronic disability due to posttraumatic arthritis in his right hip that is limiting his recovery        Plan:          No follow-ups on file.    I explained my assessment    I extended the patient's work leave for 1 month    The patient hopes to return to work for a while before  considering right hip reconstruction.  He can have a virtual appointment for 1 month and be released to full duty, if he feels safe returning to his  at that time..

## 2024-10-08 NOTE — PROGRESS NOTES
Established Patient - Audio Only Telehealth Visit     The patient location is: Home  The chief complaint leading to consultation is: 3 months s/p L JUVE  Visit type: Virtual visit with audio only (telephone)  Total time spent with patient:  5 minutes       The reason for the audio only service rather than synchronous audio and video virtual visit was related to technical difficulties or patient preference/necessity.     Each patient to whom I provide medical services by telemedicine is:  (1) informed of the relationship between the physician and patient and the respective role of any other health care provider with respect to management of the patient; and (2) notified that they may decline to receive medical services by telemedicine and may withdraw from such care at any time. Patient verbally consented to receive this service via voice-only telephone call.      HPI:   Patient is 3 months s/p L JUVE (7/3/2024)  He is doing well.   Pain has been controlled  Reports doing HEP.   Ambulation is improving  Post surgical complaints include:  None.   No n/v, fever, chills, SOB, leg pain, surgical site irritation or drainage.         Assessment and Plan:  Physical therapy:  Continue physical therapy.    Pain control: continue current pain regimen. Pt may continue to have intermittent swelling and pain for the 1st year post op. 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  He has discussed this with his boss and has agreed to return to work next year.  Forms will be signed for him to return on January 1, 2025  Follow-up:  1 year postop follow-up with Dr. Holguin in 9 months    All of the patient's questions were answered and the patient will contact us if they have any questions or concerns in the interim.      Kaley Bent, PA-C [facility]  Orthopedic Surgery        Medical Dictation software was used during the dictation of portions or the entirety of this medical record.  Phonetic or grammatic errors may exist due to the use of this software. For clarification, refer to the author of the document.    This service was not originating from a related E/M service provided within the previous 7 days nor will  to an E/M service or procedure within the next 24 hours or my soonest available appointment.  Prevailing standard of care was able to be met in this audio-only visit.

## 2024-10-11 ENCOUNTER — OFFICE VISIT (OUTPATIENT)
Dept: ORTHOPEDICS | Facility: CLINIC | Age: 59
End: 2024-10-11
Payer: MEDICARE

## 2024-10-11 DIAGNOSIS — Z41.9 ELECTIVE SURGERY: ICD-10-CM

## 2024-10-11 DIAGNOSIS — Z96.642 S/P TOTAL LEFT HIP ARTHROPLASTY: ICD-10-CM

## 2024-10-11 DIAGNOSIS — M16.12 PRIMARY OSTEOARTHRITIS OF LEFT HIP: Primary | ICD-10-CM

## 2024-11-22 ENCOUNTER — TELEPHONE (OUTPATIENT)
Dept: ORTHOPEDICS | Facility: CLINIC | Age: 59
End: 2024-11-22
Payer: MEDICARE

## 2024-11-23 NOTE — TELEPHONE ENCOUNTER
Patient IQ completed.     Patient also inquired about paperwork needing to be faxed over to [employer]. Message sent to staff in regards to paperwork. All questions answered and verbalization expressed.

## 2024-11-25 ENCOUNTER — TELEPHONE (OUTPATIENT)
Dept: ORTHOPEDICS | Facility: CLINIC | Age: 59
End: 2024-11-25
Payer: MEDICARE

## 2024-11-25 NOTE — TELEPHONE ENCOUNTER
Spoke with Mr Riley, pt states he needs work note faxed to [facility]  his estimated return to work date is 01/15/2025- per patient. Mr Riley was informed a message will be sent over to [employer]. As of now, we didn't received any forms from [facility].     Patient is requesting a letter to be faxed to [facility] at [phone] / [employer] at [phone].

## 2024-11-25 NOTE — TELEPHONE ENCOUNTER
Spoke with Mr. Riley, informed letter has been faxed to [facility] at [phone] and will be mailed.

## 2024-11-25 NOTE — TELEPHONE ENCOUNTER
----- Message from Hermelinda sent at 11/25/2024  2:08 PM CST -----  Contact: Nyla- pt wife  Type:  Needs Medical Advice    Who Called: Nyla   Would the patient rather a call back or a response via [facility]? Call   Best Call Back Number: [phone]   Additional Information:  Nyla would like a call back in regards to paperwork [employer] is needing for the pt disability claim. She states that they have faxed the paperwork to the office.

## 2024-12-20 ENCOUNTER — TELEPHONE (OUTPATIENT)
Dept: ORTHOPEDICS | Facility: CLINIC | Age: 59
End: 2024-12-20
Payer: MEDICARE

## 2024-12-20 NOTE — TELEPHONE ENCOUNTER
"----- Message from Marianna sent at 12/20/2024 12:52 PM CST -----  Regarding: Letter  "Type:  Patient Call Back    Who Called:Nyla white(Spouse)    What is the reqeust in detail:Requesting doctor to fax letter to [employer] Stating pt is still out of work.Please Advise         Can the clinic reply by [facility]?no     Best Call Back Number:[phone]      Additional Information:[employer] Stating pt is still out of work. Spouse states office has fax number  "

## (undated) DEVICE — SUT STRATAFIX 3-0 30CM

## (undated) DEVICE — DRAPE HIP PCH 112X137X89IN

## (undated) DEVICE — SOCKINETTE IMPERVIOUS 12X48IN

## (undated) DEVICE — DRESSING AQUACEL AG ADV 3.5X12

## (undated) DEVICE — DRAPE STERI INSTRUMENT 1018

## (undated) DEVICE — GOWN POLY REINF X-LONG 2XL

## (undated) DEVICE — BLADE SURG CARBON STEEL #10

## (undated) DEVICE — SUT CTD VICRYL 1 UND BR CT

## (undated) DEVICE — DRAPE U SPLIT SHEET 54X76IN

## (undated) DEVICE — PACK SURGERY START

## (undated) DEVICE — SPONGE LAP 18X18 PREWASHED

## (undated) DEVICE — SUT 2/0 36IN COATED VICRYL

## (undated) DEVICE — SYR 10CC LUER LOCK

## (undated) DEVICE — COVER OVERHEAD SURG LT BLUE

## (undated) DEVICE — GLOVE SIGNATURE ESSNTL LTX 8

## (undated) DEVICE — PILLOW ABDUCTION FOAM MED

## (undated) DEVICE — CAUTERY TIP POLISHER

## (undated) DEVICE — BLADE ELECTRO EDGE INSULATED

## (undated) DEVICE — GOWN POLY REINF BRTH SLV XL

## (undated) DEVICE — GLOVE SIGNATURE ESSNTL LTX 8.5

## (undated) DEVICE — APPLICATOR CHLORAPREP ORN 26ML

## (undated) DEVICE — PACK ECLIPSE BASIC III SURG

## (undated) DEVICE — NDL HYPO STD REG BVL 18GX1.5IN

## (undated) DEVICE — DRESSING AQUACEL SACRAL 9 X 9

## (undated) DEVICE — SUT STRATAFIX PDS+ 1 CTX 24IN

## (undated) DEVICE — TOWEL OR XRAY BLUE 17X26IN

## (undated) DEVICE — DRAPE THREE-QTR REINF 53X77IN

## (undated) DEVICE — BNDG COFLEX FOAM LF2 ST 6X5YD

## (undated) DEVICE — SYS CLSR DERMABOND PRINEO 22CM

## (undated) DEVICE — DRAPE STERI U-SHAPED 47X51IN

## (undated) DEVICE — DRAPE INCISE IOBAN 2 23X23IN

## (undated) DEVICE — BLADE SAW SAG 25.40MM X 1.27MM

## (undated) DEVICE — GOWN POLY REINF BRTH SLV LG

## (undated) DEVICE — MANIFOLD 4 PORT

## (undated) DEVICE — DRAPE INVISISHIELD TOWEL SMALL

## (undated) DEVICE — ELECTRODE REM PLYHSV RETURN 9

## (undated) DEVICE — HOOD T-5 TEAR AWAY STERILE

## (undated) DEVICE — COVER PROXIMA MAYO STAND